# Patient Record
Sex: FEMALE | Race: WHITE | URBAN - METROPOLITAN AREA
[De-identification: names, ages, dates, MRNs, and addresses within clinical notes are randomized per-mention and may not be internally consistent; named-entity substitution may affect disease eponyms.]

---

## 2017-04-15 ENCOUNTER — HOSPITAL ENCOUNTER (OUTPATIENT)
Dept: MEDSURG UNIT | Facility: HOSPITAL | Age: 62
End: 2017-04-15
Attending: INTERNAL MEDICINE | Admitting: INTERNAL MEDICINE

## 2017-05-02 ENCOUNTER — HISTORICAL (OUTPATIENT)
Dept: SURGERY | Facility: HOSPITAL | Age: 62
End: 2017-05-02

## 2017-05-02 LAB — GRAM STN SPEC: NORMAL

## 2017-05-07 LAB — FINAL CULTURE: NORMAL

## 2017-05-16 ENCOUNTER — HISTORICAL (OUTPATIENT)
Dept: ADMINISTRATIVE | Facility: HOSPITAL | Age: 62
End: 2017-05-16

## 2017-05-16 LAB
ABS NEUT (OLG): 6.02 X10(3)/MCL (ref 2.1–9.2)
ALBUMIN SERPL-MCNC: 3.2 GM/DL (ref 3.4–5)
ALBUMIN/GLOB SERPL: 1 RATIO (ref 1–2)
ALP SERPL-CCNC: 276 UNIT/L (ref 20–120)
ALT SERPL-CCNC: 34 UNIT/L
AST SERPL-CCNC: 82 UNIT/L
BASOPHILS # BLD AUTO: 0.05 X10(3)/MCL
BASOPHILS NFR BLD AUTO: 1 % (ref 0–1)
BILIRUB SERPL-MCNC: 1.1 MG/DL
BILIRUBIN DIRECT+TOT PNL SERPL-MCNC: 0.3 MG/DL
BILIRUBIN DIRECT+TOT PNL SERPL-MCNC: 0.8 MG/DL
BUN SERPL-MCNC: 20 MG/DL (ref 7–25)
CALCIUM SERPL-MCNC: 11.9 MG/DL (ref 8.4–10.3)
CHLORIDE SERPL-SCNC: 97 MMOL/L (ref 96–110)
CO2 SERPL-SCNC: 23 MMOL/L (ref 24–32)
CREAT SERPL-MCNC: 1.02 MG/DL (ref 0.7–1.1)
EOSINOPHIL # BLD AUTO: 0.02 10*3/UL
EOSINOPHIL NFR BLD AUTO: 0 % (ref 0–5)
ERYTHROCYTE [DISTWIDTH] IN BLOOD BY AUTOMATED COUNT: 14.3 % (ref 11.5–14.5)
GLOBULIN SER-MCNC: 4.6 GM/ML (ref 2.3–3.5)
GLUCOSE SERPL-MCNC: 121 MG/DL (ref 65–99)
HCT VFR BLD AUTO: 39.6 % (ref 35–46)
HGB BLD-MCNC: 13.1 GM/DL (ref 12–16)
IMM GRANULOCYTES # BLD AUTO: 0.08 10*3/UL
IMM GRANULOCYTES NFR BLD AUTO: 1 %
LDH SERPL-CCNC: 319 UNIT/L
LYMPHOCYTES # BLD AUTO: 1.53 X10(3)/MCL
LYMPHOCYTES NFR BLD AUTO: 18 % (ref 15–40)
MCH RBC QN AUTO: 29 PG (ref 26–34)
MCHC RBC AUTO-ENTMCNC: 33.1 GM/DL (ref 31–37)
MCV RBC AUTO: 87.6 FL (ref 80–100)
MONOCYTES # BLD AUTO: 0.69 X10(3)/MCL
MONOCYTES NFR BLD AUTO: 8 % (ref 4–12)
NEUTROPHILS # BLD AUTO: 6.02 X10(3)/MCL
NEUTROPHILS NFR BLD AUTO: 72 X10(3)/MCL
PLATELET # BLD AUTO: 291 X10(3)/MCL (ref 130–400)
PMV BLD AUTO: 9.8 FL (ref 7.4–10.4)
POTASSIUM SERPL-SCNC: 4.6 MMOL/L (ref 3.6–5.2)
PROT SERPL-MCNC: 7.8 GM/DL (ref 6–8)
RBC # BLD AUTO: 4.52 X10(6)/MCL (ref 4–5.2)
SODIUM SERPL-SCNC: 135 MMOL/L (ref 135–146)
WBC # SPEC AUTO: 8.4 X10(3)/MCL (ref 4.5–11)

## 2017-05-17 ENCOUNTER — HISTORICAL (OUTPATIENT)
Dept: HEMATOLOGY/ONCOLOGY | Facility: CLINIC | Age: 62
End: 2017-05-17

## 2017-05-17 ENCOUNTER — HISTORICAL (OUTPATIENT)
Dept: INFUSION THERAPY | Facility: HOSPITAL | Age: 62
End: 2017-05-17

## 2017-05-23 ENCOUNTER — HISTORICAL (OUTPATIENT)
Dept: HEMATOLOGY/ONCOLOGY | Facility: CLINIC | Age: 62
End: 2017-05-23

## 2017-05-30 LAB — FINAL CULTURE: NORMAL

## 2017-06-07 ENCOUNTER — HISTORICAL (OUTPATIENT)
Dept: INFUSION THERAPY | Facility: HOSPITAL | Age: 62
End: 2017-06-07

## 2017-06-07 LAB
ABS NEUT (OLG): 4.25 X10(3)/MCL (ref 2.1–9.2)
ALBUMIN SERPL-MCNC: 3 GM/DL (ref 3.4–5)
ALBUMIN/GLOB SERPL: 1 {RATIO}
ALP SERPL-CCNC: 330 UNIT/L (ref 20–120)
ALT SERPL-CCNC: 44 UNIT/L
AST SERPL-CCNC: 50 UNIT/L
BASOPHILS # BLD AUTO: 0.02 X10(3)/MCL
BASOPHILS NFR BLD AUTO: 0 % (ref 0–1)
BILIRUB SERPL-MCNC: 0.6 MG/DL
BILIRUBIN DIRECT+TOT PNL SERPL-MCNC: 0.1 MG/DL
BILIRUBIN DIRECT+TOT PNL SERPL-MCNC: 0.5 MG/DL
BUN SERPL-MCNC: 17 MG/DL (ref 7–25)
CALCIUM SERPL-MCNC: 9.2 MG/DL (ref 8.4–10.3)
CHLORIDE SERPL-SCNC: 104 MMOL/L (ref 96–110)
CO2 SERPL-SCNC: 24 MMOL/L (ref 24–32)
CREAT SERPL-MCNC: 0.6 MG/DL (ref 0.7–1.1)
ERYTHROCYTE [DISTWIDTH] IN BLOOD BY AUTOMATED COUNT: 16.6 % (ref 11.5–14.5)
GLOBULIN SER-MCNC: 3.9 GM/ML (ref 2.3–3.5)
GLUCOSE SERPL-MCNC: 132 MG/DL (ref 65–99)
HCT VFR BLD AUTO: 33.8 % (ref 35–46)
HGB BLD-MCNC: 10.9 GM/DL (ref 12–16)
IMM GRANULOCYTES # BLD AUTO: 0.07 10*3/UL
IMM GRANULOCYTES NFR BLD AUTO: 1 %
LYMPHOCYTES # BLD AUTO: 1.52 X10(3)/MCL
LYMPHOCYTES NFR BLD AUTO: 24 % (ref 15–40)
MCH RBC QN AUTO: 28.2 PG (ref 26–34)
MCHC RBC AUTO-ENTMCNC: 32.2 GM/DL (ref 31–37)
MCV RBC AUTO: 87.6 FL (ref 80–100)
MONOCYTES # BLD AUTO: 0.5 X10(3)/MCL
MONOCYTES NFR BLD AUTO: 8 % (ref 4–12)
NEUTROPHILS # BLD AUTO: 4.25 X10(3)/MCL
NEUTROPHILS NFR BLD AUTO: 67 X10(3)/MCL
PLATELET # BLD AUTO: 289 X10(3)/MCL (ref 130–400)
PMV BLD AUTO: 9.6 FL (ref 7.4–10.4)
POTASSIUM SERPL-SCNC: 4.7 MMOL/L (ref 3.6–5.2)
PROT SERPL-MCNC: 6.9 GM/DL (ref 6–8)
RBC # BLD AUTO: 3.86 X10(6)/MCL (ref 4–5.2)
SODIUM SERPL-SCNC: 135 MMOL/L (ref 135–146)
WBC # SPEC AUTO: 6.4 X10(3)/MCL (ref 4.5–11)

## 2017-06-21 ENCOUNTER — HISTORICAL (OUTPATIENT)
Dept: INFUSION THERAPY | Facility: HOSPITAL | Age: 62
End: 2017-06-21

## 2017-06-21 LAB
ABS NEUT (OLG): 2.52 X10(3)/MCL (ref 2.1–9.2)
ALBUMIN SERPL-MCNC: 3.6 GM/DL (ref 3.4–5)
ALBUMIN/GLOB SERPL: 1 {RATIO}
ALP SERPL-CCNC: 328 UNIT/L (ref 20–120)
ALT SERPL-CCNC: 40 UNIT/L
AST SERPL-CCNC: 42 UNIT/L
BASOPHILS # BLD AUTO: 0.01 X10(3)/MCL
BASOPHILS NFR BLD AUTO: 0 % (ref 0–1)
BILIRUB SERPL-MCNC: 0.5 MG/DL
BILIRUBIN DIRECT+TOT PNL SERPL-MCNC: <0.1 MG/DL
BILIRUBIN DIRECT+TOT PNL SERPL-MCNC: >0.4 MG/DL
BUN SERPL-MCNC: 14 MG/DL (ref 7–25)
CALCIUM SERPL-MCNC: 9.6 MG/DL (ref 8.4–10.3)
CHLORIDE SERPL-SCNC: 111 MMOL/L (ref 96–110)
CO2 SERPL-SCNC: 20 MMOL/L (ref 24–32)
CREAT SERPL-MCNC: 0.63 MG/DL (ref 0.7–1.1)
EOSINOPHIL # BLD AUTO: 0.03 X10(3)/MCL
EOSINOPHIL NFR BLD AUTO: 1 % (ref 0–5)
ERYTHROCYTE [DISTWIDTH] IN BLOOD BY AUTOMATED COUNT: 18.6 % (ref 11.5–14.5)
GLOBULIN SER-MCNC: 3.6 GM/ML (ref 2.3–3.5)
GLUCOSE SERPL-MCNC: 113 MG/DL (ref 65–99)
HCT VFR BLD AUTO: 33.5 % (ref 35–46)
HGB BLD-MCNC: 10.9 GM/DL (ref 12–16)
IMM GRANULOCYTES # BLD AUTO: 0.04 10*3/UL
IMM GRANULOCYTES NFR BLD AUTO: 1 %
LYMPHOCYTES # BLD AUTO: 2.15 X10(3)/MCL
LYMPHOCYTES NFR BLD AUTO: 42 % (ref 15–40)
MCH RBC QN AUTO: 28.5 PG (ref 26–34)
MCHC RBC AUTO-ENTMCNC: 32.5 GM/DL (ref 31–37)
MCV RBC AUTO: 87.7 FL (ref 80–100)
MONOCYTES # BLD AUTO: 0.43 X10(3)/MCL
MONOCYTES NFR BLD AUTO: 8 % (ref 4–12)
NEUTROPHILS # BLD AUTO: 2.52 X10(3)/MCL
NEUTROPHILS NFR BLD AUTO: 49 X10(3)/MCL
PLATELET # BLD AUTO: 279 X10(3)/MCL (ref 130–400)
PMV BLD AUTO: 9.5 FL (ref 7.4–10.4)
POTASSIUM SERPL-SCNC: 4.4 MMOL/L (ref 3.6–5.2)
PROT SERPL-MCNC: 7.2 GM/DL (ref 6–8)
RBC # BLD AUTO: 3.82 X10(6)/MCL (ref 4–5.2)
SODIUM SERPL-SCNC: 137 MMOL/L (ref 135–146)
WBC # SPEC AUTO: 5.2 X10(3)/MCL (ref 4.5–11)

## 2017-07-07 ENCOUNTER — HISTORICAL (OUTPATIENT)
Dept: INFUSION THERAPY | Facility: HOSPITAL | Age: 62
End: 2017-07-07

## 2017-08-08 ENCOUNTER — HISTORICAL (OUTPATIENT)
Dept: INFUSION THERAPY | Facility: HOSPITAL | Age: 62
End: 2017-08-08

## 2017-09-07 ENCOUNTER — HISTORICAL (OUTPATIENT)
Dept: ADMINISTRATIVE | Facility: HOSPITAL | Age: 62
End: 2017-09-07

## 2017-09-07 LAB
ABS NEUT (OLG): 2.56 X10(3)/MCL (ref 2.1–9.2)
ALBUMIN SERPL-MCNC: 3.8 GM/DL (ref 3.4–5)
ALBUMIN/GLOB SERPL: 1 RATIO (ref 1–2)
ALP SERPL-CCNC: 182 UNIT/L (ref 45–117)
ALT SERPL-CCNC: 47 UNIT/L (ref 12–78)
AST SERPL-CCNC: 36 UNIT/L (ref 15–37)
BASOPHILS # BLD AUTO: 0.03 X10(3)/MCL
BASOPHILS NFR BLD AUTO: 1 % (ref 0–1)
BILIRUB SERPL-MCNC: 0.3 MG/DL (ref 0.2–1)
BILIRUBIN DIRECT+TOT PNL SERPL-MCNC: 0.1 MG/DL
BILIRUBIN DIRECT+TOT PNL SERPL-MCNC: 0.2 MG/DL
BUN SERPL-MCNC: 15 MG/DL (ref 7–18)
CALCIUM SERPL-MCNC: 10 MG/DL (ref 8.5–10.1)
CEA SERPL-MCNC: 10.2 NG/ML
CHLORIDE SERPL-SCNC: 106 MMOL/L (ref 98–107)
CO2 SERPL-SCNC: 27 MMOL/L (ref 21–32)
CREAT SERPL-MCNC: 0.9 MG/DL (ref 0.6–1.3)
EOSINOPHIL # BLD AUTO: 0.06 X10(3)/MCL
EOSINOPHIL NFR BLD AUTO: 1 % (ref 0–5)
ERYTHROCYTE [DISTWIDTH] IN BLOOD BY AUTOMATED COUNT: 18.3 % (ref 11.5–14.5)
GLOBULIN SER-MCNC: 4.3 GM/ML (ref 2.3–3.5)
GLUCOSE SERPL-MCNC: 96 MG/DL (ref 74–106)
HCT VFR BLD AUTO: 38.7 % (ref 35–46)
HGB BLD-MCNC: 12.7 GM/DL (ref 12–16)
IMM GRANULOCYTES # BLD AUTO: 0.04 10*3/UL
IMM GRANULOCYTES NFR BLD AUTO: 1 %
LYMPHOCYTES # BLD AUTO: 2 X10(3)/MCL
LYMPHOCYTES NFR BLD AUTO: 39 % (ref 15–40)
MCH RBC QN AUTO: 31.2 PG (ref 26–34)
MCHC RBC AUTO-ENTMCNC: 32.8 GM/DL (ref 31–37)
MCV RBC AUTO: 95.1 FL (ref 80–100)
MONOCYTES # BLD AUTO: 0.48 X10(3)/MCL
MONOCYTES NFR BLD AUTO: 9 % (ref 4–12)
NEUTROPHILS # BLD AUTO: 2.56 X10(3)/MCL
NEUTROPHILS NFR BLD AUTO: 49 X10(3)/MCL
PLATELET # BLD AUTO: 278 X10(3)/MCL (ref 130–400)
PMV BLD AUTO: 9.5 FL (ref 7.4–10.4)
POTASSIUM SERPL-SCNC: 4.3 MMOL/L (ref 3.5–5.1)
PROT SERPL-MCNC: 8.1 GM/DL (ref 6.4–8.2)
RBC # BLD AUTO: 4.07 X10(6)/MCL (ref 4–5.2)
SODIUM SERPL-SCNC: 140 MMOL/L (ref 136–145)
WBC # SPEC AUTO: 5.2 X10(3)/MCL (ref 4.5–11)

## 2017-09-08 ENCOUNTER — HISTORICAL (OUTPATIENT)
Dept: INFUSION THERAPY | Facility: HOSPITAL | Age: 62
End: 2017-09-08

## 2017-09-15 ENCOUNTER — HISTORICAL (OUTPATIENT)
Dept: RADIOLOGY | Facility: HOSPITAL | Age: 62
End: 2017-09-15

## 2017-10-06 ENCOUNTER — HISTORICAL (OUTPATIENT)
Dept: INFUSION THERAPY | Facility: HOSPITAL | Age: 62
End: 2017-10-06

## 2017-10-06 LAB
ABS NEUT (OLG): 1.42 X10(3)/MCL (ref 2.1–9.2)
BASOPHILS # BLD AUTO: 0.01 X10(3)/MCL
BASOPHILS NFR BLD AUTO: 0 % (ref 0–1)
ERYTHROCYTE [DISTWIDTH] IN BLOOD BY AUTOMATED COUNT: 15.9 % (ref 11.5–14.5)
HCT VFR BLD AUTO: 35.7 % (ref 35–46)
HGB BLD-MCNC: 12.2 GM/DL (ref 12–16)
IMM GRANULOCYTES # BLD AUTO: 0.01 10*3/UL
IMM GRANULOCYTES NFR BLD AUTO: 0 %
LYMPHOCYTES # BLD AUTO: 1.06 X10(3)/MCL
LYMPHOCYTES NFR BLD AUTO: 41 % (ref 15–40)
MCH RBC QN AUTO: 33.7 PG (ref 26–34)
MCHC RBC AUTO-ENTMCNC: 34.2 GM/DL (ref 31–37)
MCV RBC AUTO: 98.6 FL (ref 80–100)
MONOCYTES # BLD AUTO: 0.09 X10(3)/MCL
MONOCYTES NFR BLD AUTO: 4 % (ref 4–12)
NEUTROPHILS # BLD AUTO: 1.42 X10(3)/MCL
NEUTROPHILS NFR BLD AUTO: 55 X10(3)/MCL
PLATELET # BLD AUTO: 141 X10(3)/MCL (ref 130–400)
PMV BLD AUTO: 9.8 FL (ref 7.4–10.4)
RBC # BLD AUTO: 3.62 X10(6)/MCL (ref 4–5.2)
WBC # SPEC AUTO: 2.6 X10(3)/MCL (ref 4.5–11)

## 2017-10-23 ENCOUNTER — HISTORICAL (OUTPATIENT)
Dept: ADMINISTRATIVE | Facility: HOSPITAL | Age: 62
End: 2017-10-23

## 2017-10-23 LAB
ABS NEUT (OLG): 2.35 X10(3)/MCL (ref 2.1–9.2)
ALBUMIN SERPL-MCNC: 3.4 GM/DL (ref 3.4–5)
ALBUMIN/GLOB SERPL: 1 RATIO (ref 1–2)
ALP SERPL-CCNC: 103 UNIT/L (ref 45–117)
ALT SERPL-CCNC: 26 UNIT/L (ref 12–78)
AST SERPL-CCNC: 14 UNIT/L (ref 15–37)
BASOPHILS # BLD AUTO: 0.06 X10(3)/MCL
BASOPHILS NFR BLD AUTO: 2 % (ref 0–1)
BILIRUB SERPL-MCNC: 0.3 MG/DL (ref 0.2–1)
BILIRUBIN DIRECT+TOT PNL SERPL-MCNC: 0.1 MG/DL
BILIRUBIN DIRECT+TOT PNL SERPL-MCNC: 0.2 MG/DL
BUN SERPL-MCNC: 10 MG/DL (ref 7–18)
CALCIUM SERPL-MCNC: 9.9 MG/DL (ref 8.5–10.1)
CHLORIDE SERPL-SCNC: 106 MMOL/L (ref 98–107)
CO2 SERPL-SCNC: 26 MMOL/L (ref 21–32)
CREAT SERPL-MCNC: 0.9 MG/DL (ref 0.6–1.3)
ERYTHROCYTE [DISTWIDTH] IN BLOOD BY AUTOMATED COUNT: 17.2 % (ref 11.5–14.5)
GLOBULIN SER-MCNC: 3.9 GM/ML (ref 2.3–3.5)
GLUCOSE SERPL-MCNC: 128 MG/DL (ref 74–106)
HCT VFR BLD AUTO: 35.8 % (ref 35–46)
HGB BLD-MCNC: 12.4 GM/DL (ref 12–16)
IMM GRANULOCYTES # BLD AUTO: 0.01 10*3/UL
IMM GRANULOCYTES NFR BLD AUTO: 0 %
LYMPHOCYTES # BLD AUTO: 0.77 X10(3)/MCL
LYMPHOCYTES NFR BLD AUTO: 23 % (ref 15–40)
MCH RBC QN AUTO: 34.3 PG (ref 26–34)
MCHC RBC AUTO-ENTMCNC: 34.6 GM/DL (ref 31–37)
MCV RBC AUTO: 98.9 FL (ref 80–100)
MONOCYTES # BLD AUTO: 0.16 X10(3)/MCL
MONOCYTES NFR BLD AUTO: 5 % (ref 4–12)
NEUTROPHILS # BLD AUTO: 2.35 X10(3)/MCL
NEUTROPHILS NFR BLD AUTO: 70 X10(3)/MCL
PLATELET # BLD AUTO: 354 X10(3)/MCL (ref 130–400)
PMV BLD AUTO: 9.3 FL (ref 7.4–10.4)
POTASSIUM SERPL-SCNC: 4.1 MMOL/L (ref 3.5–5.1)
PROT SERPL-MCNC: 7.3 GM/DL (ref 6.4–8.2)
RBC # BLD AUTO: 3.62 X10(6)/MCL (ref 4–5.2)
SODIUM SERPL-SCNC: 138 MMOL/L (ref 136–145)
WBC # SPEC AUTO: 3.4 X10(3)/MCL (ref 4.5–11)

## 2017-11-03 ENCOUNTER — HISTORICAL (OUTPATIENT)
Dept: INFUSION THERAPY | Facility: HOSPITAL | Age: 62
End: 2017-11-03

## 2017-11-03 LAB
ABS NEUT (OLG): 1.79 X10(3)/MCL
ALBUMIN SERPL-MCNC: 3.3 GM/DL (ref 3.4–5)
ALBUMIN/GLOB SERPL: 1 RATIO (ref 1–2)
ALP SERPL-CCNC: 91 UNIT/L (ref 45–117)
ALT SERPL-CCNC: 25 UNIT/L (ref 12–78)
ANISOCYTOSIS BLD QL SMEAR: NORMAL
AST SERPL-CCNC: 15 UNIT/L (ref 15–37)
BASOPHILS NFR BLD MANUAL: 1 %
BILIRUB SERPL-MCNC: 0.4 MG/DL (ref 0.2–1)
BILIRUBIN DIRECT+TOT PNL SERPL-MCNC: 0.1 MG/DL
BILIRUBIN DIRECT+TOT PNL SERPL-MCNC: 0.3 MG/DL
BUN SERPL-MCNC: 12 MG/DL (ref 7–18)
CALCIUM SERPL-MCNC: 9.5 MG/DL (ref 8.5–10.1)
CHLORIDE SERPL-SCNC: 106 MMOL/L (ref 98–107)
CO2 SERPL-SCNC: 27 MMOL/L (ref 21–32)
CREAT SERPL-MCNC: 1 MG/DL (ref 0.6–1.3)
EOSINOPHIL NFR BLD MANUAL: 0 %
ERYTHROCYTE [DISTWIDTH] IN BLOOD BY AUTOMATED COUNT: 17.9 % (ref 11.5–14.5)
GLOBULIN SER-MCNC: 3.9 GM/ML (ref 2.3–3.5)
GLUCOSE SERPL-MCNC: 122 MG/DL (ref 74–106)
GRANULOCYTES NFR BLD MANUAL: 68 % (ref 43–75)
HCT VFR BLD AUTO: 35.2 % (ref 35–46)
HGB BLD-MCNC: 12.2 GM/DL (ref 12–16)
HYPOCHROMIA BLD QL SMEAR: NORMAL
LYMPHOCYTES NFR BLD MANUAL: 27 % (ref 20.5–51.1)
MACROCYTES BLD QL SMEAR: NORMAL
MCH RBC QN AUTO: 35 PG (ref 26–34)
MCHC RBC AUTO-ENTMCNC: 34.7 GM/DL (ref 31–37)
MCV RBC AUTO: 100.9 FL (ref 80–100)
MICROCYTES BLD QL SMEAR: NORMAL
MONOCYTES NFR BLD MANUAL: 2 % (ref 2–9)
NEUTROPHILS # BLD AUTO: 1.75 X10(3)/MCL
NEUTS BAND NFR BLD MANUAL: 2 % (ref 0–10)
PLATELET # BLD AUTO: 167 X10(3)/MCL (ref 130–400)
PLATELET # BLD EST: ADEQUATE 10*3/UL
PMV BLD AUTO: 9 FL (ref 7.4–10.4)
POLYCHROMASIA BLD QL SMEAR: NORMAL
POTASSIUM SERPL-SCNC: 4.4 MMOL/L (ref 3.5–5.1)
PROT SERPL-MCNC: 7.2 GM/DL (ref 6.4–8.2)
RBC # BLD AUTO: 3.49 X10(6)/MCL (ref 4–5.2)
RBC MORPH BLD: NORMAL
SODIUM SERPL-SCNC: 139 MMOL/L (ref 136–145)
WBC # SPEC AUTO: 2.7 X10(3)/MCL (ref 4.5–11)

## 2017-11-30 ENCOUNTER — HISTORICAL (OUTPATIENT)
Dept: ADMINISTRATIVE | Facility: HOSPITAL | Age: 62
End: 2017-11-30

## 2017-11-30 LAB
ABS NEUT (OLG): 1.21 X10(3)/MCL (ref 2.1–9.2)
ALBUMIN SERPL-MCNC: 3.4 GM/DL (ref 3.4–5)
ALBUMIN/GLOB SERPL: 1 RATIO (ref 1–2)
ALP SERPL-CCNC: 91 UNIT/L (ref 45–117)
ALT SERPL-CCNC: 23 UNIT/L (ref 12–78)
AST SERPL-CCNC: 11 UNIT/L (ref 15–37)
BASOPHILS # BLD AUTO: 0.02 X10(3)/MCL
BASOPHILS NFR BLD AUTO: 1 % (ref 0–1)
BILIRUB SERPL-MCNC: 0.4 MG/DL (ref 0.2–1)
BILIRUBIN DIRECT+TOT PNL SERPL-MCNC: 0.1 MG/DL
BILIRUBIN DIRECT+TOT PNL SERPL-MCNC: 0.3 MG/DL
BUN SERPL-MCNC: 18 MG/DL (ref 7–18)
CALCIUM SERPL-MCNC: 9.3 MG/DL (ref 8.5–10.1)
CHLORIDE SERPL-SCNC: 104 MMOL/L (ref 98–107)
CO2 SERPL-SCNC: 29 MMOL/L (ref 21–32)
CREAT SERPL-MCNC: 1.4 MG/DL (ref 0.6–1.3)
EOSINOPHIL # BLD AUTO: 0.03 X10(3)/MCL
EOSINOPHIL NFR BLD AUTO: 1 % (ref 0–5)
ERYTHROCYTE [DISTWIDTH] IN BLOOD BY AUTOMATED COUNT: 19.9 % (ref 11.5–14.5)
GLOBULIN SER-MCNC: 3.8 GM/ML (ref 2.3–3.5)
GLUCOSE SERPL-MCNC: 99 MG/DL (ref 74–106)
HCT VFR BLD AUTO: 36.1 % (ref 35–46)
HGB BLD-MCNC: 12.5 GM/DL (ref 12–16)
IMM GRANULOCYTES # BLD AUTO: 0.01 10*3/UL
IMM GRANULOCYTES NFR BLD AUTO: 0 %
LYMPHOCYTES # BLD AUTO: 1.39 X10(3)/MCL
LYMPHOCYTES NFR BLD AUTO: 49 % (ref 15–40)
MCH RBC QN AUTO: 36.2 PG (ref 26–34)
MCHC RBC AUTO-ENTMCNC: 34.6 GM/DL (ref 31–37)
MCV RBC AUTO: 104.6 FL (ref 80–100)
MONOCYTES # BLD AUTO: 0.18 X10(3)/MCL
MONOCYTES NFR BLD AUTO: 6 % (ref 4–12)
NEUTROPHILS # BLD AUTO: 1.21 X10(3)/MCL
NEUTROPHILS NFR BLD AUTO: 43 X10(3)/MCL
PLATELET # BLD AUTO: 190 X10(3)/MCL (ref 130–400)
PMV BLD AUTO: 9 FL (ref 7.4–10.4)
POTASSIUM SERPL-SCNC: 4 MMOL/L (ref 3.5–5.1)
PROT SERPL-MCNC: 7.2 GM/DL (ref 6.4–8.2)
RBC # BLD AUTO: 3.45 X10(6)/MCL (ref 4–5.2)
SODIUM SERPL-SCNC: 140 MMOL/L (ref 136–145)
WBC # SPEC AUTO: 2.8 X10(3)/MCL (ref 4.5–11)

## 2017-12-01 ENCOUNTER — HISTORICAL (OUTPATIENT)
Dept: INFUSION THERAPY | Facility: HOSPITAL | Age: 62
End: 2017-12-01

## 2017-12-11 ENCOUNTER — HISTORICAL (OUTPATIENT)
Dept: RADIOLOGY | Facility: HOSPITAL | Age: 62
End: 2017-12-11

## 2017-12-20 ENCOUNTER — HISTORICAL (OUTPATIENT)
Dept: RADIOLOGY | Facility: HOSPITAL | Age: 62
End: 2017-12-20

## 2017-12-29 ENCOUNTER — HISTORICAL (OUTPATIENT)
Dept: INFUSION THERAPY | Facility: HOSPITAL | Age: 62
End: 2017-12-29

## 2017-12-29 LAB
ABS NEUT (OLG): 1.51 X10(3)/MCL (ref 2.1–9.2)
ALBUMIN SERPL-MCNC: 3.1 GM/DL (ref 3.4–5)
ALBUMIN/GLOB SERPL: 1 RATIO (ref 1–2)
ALP SERPL-CCNC: 84 UNIT/L (ref 45–117)
ALT SERPL-CCNC: 23 UNIT/L (ref 12–78)
AST SERPL-CCNC: 14 UNIT/L (ref 15–37)
BASOPHILS # BLD AUTO: 0.02 X10(3)/MCL
BASOPHILS NFR BLD AUTO: 1 % (ref 0–1)
BILIRUB SERPL-MCNC: 0.3 MG/DL (ref 0.2–1)
BILIRUBIN DIRECT+TOT PNL SERPL-MCNC: <0.1 MG/DL
BILIRUBIN DIRECT+TOT PNL SERPL-MCNC: ABNORMAL MG/DL
BUN SERPL-MCNC: 13 MG/DL (ref 7–18)
CALCIUM SERPL-MCNC: 9.3 MG/DL (ref 8.5–10.1)
CHLORIDE SERPL-SCNC: 109 MMOL/L (ref 98–107)
CO2 SERPL-SCNC: 25 MMOL/L (ref 21–32)
CREAT SERPL-MCNC: 0.9 MG/DL (ref 0.6–1.3)
EOSINOPHIL # BLD AUTO: 0.03 X10(3)/MCL
EOSINOPHIL NFR BLD AUTO: 1 % (ref 0–5)
ERYTHROCYTE [DISTWIDTH] IN BLOOD BY AUTOMATED COUNT: 18.3 % (ref 11.5–14.5)
GLOBULIN SER-MCNC: 3.8 GM/ML (ref 2.3–3.5)
GLUCOSE SERPL-MCNC: 109 MG/DL (ref 74–106)
HCT VFR BLD AUTO: 34.3 % (ref 35–46)
HGB BLD-MCNC: 12.2 GM/DL (ref 12–16)
IMM GRANULOCYTES # BLD AUTO: 0.01 10*3/UL
IMM GRANULOCYTES NFR BLD AUTO: 0 %
LYMPHOCYTES # BLD AUTO: 1.33 X10(3)/MCL
LYMPHOCYTES NFR BLD AUTO: 44 % (ref 15–40)
MCH RBC QN AUTO: 38.6 PG (ref 26–34)
MCHC RBC AUTO-ENTMCNC: 35.6 GM/DL (ref 31–37)
MCV RBC AUTO: 108.5 FL (ref 80–100)
MONOCYTES # BLD AUTO: 0.15 X10(3)/MCL
MONOCYTES NFR BLD AUTO: 5 % (ref 4–12)
NEUTROPHILS # BLD AUTO: 1.51 X10(3)/MCL
NEUTROPHILS NFR BLD AUTO: 50 X10(3)/MCL
PLATELET # BLD AUTO: 166 X10(3)/MCL (ref 130–400)
PMV BLD AUTO: 9.7 FL (ref 7.4–10.4)
POTASSIUM SERPL-SCNC: 3.7 MMOL/L (ref 3.5–5.1)
PROT SERPL-MCNC: 6.9 GM/DL (ref 6.4–8.2)
RBC # BLD AUTO: 3.16 X10(6)/MCL (ref 4–5.2)
SODIUM SERPL-SCNC: 142 MMOL/L (ref 136–145)
WBC # SPEC AUTO: 3 X10(3)/MCL (ref 4.5–11)

## 2018-01-25 ENCOUNTER — HISTORICAL (OUTPATIENT)
Dept: ADMINISTRATIVE | Facility: HOSPITAL | Age: 63
End: 2018-01-25

## 2018-01-25 LAB
ABS NEUT (OLG): 2.46 X10(3)/MCL
ALBUMIN SERPL-MCNC: 3.1 GM/DL (ref 3.4–5)
ALBUMIN/GLOB SERPL: 1 RATIO (ref 1–2)
ALP SERPL-CCNC: 76 UNIT/L (ref 45–117)
ALT SERPL-CCNC: 18 UNIT/L (ref 12–78)
ANISOCYTOSIS BLD QL SMEAR: NORMAL
AST SERPL-CCNC: 13 UNIT/L (ref 15–37)
BASOPHILS NFR BLD MANUAL: 0 %
BILIRUB SERPL-MCNC: 0.5 MG/DL (ref 0.2–1)
BILIRUBIN DIRECT+TOT PNL SERPL-MCNC: 0.1 MG/DL
BILIRUBIN DIRECT+TOT PNL SERPL-MCNC: 0.4 MG/DL
BUN SERPL-MCNC: 11 MG/DL (ref 7–18)
CALCIUM SERPL-MCNC: 9 MG/DL (ref 8.5–10.1)
CHLORIDE SERPL-SCNC: 112 MMOL/L (ref 98–107)
CO2 SERPL-SCNC: 26 MMOL/L (ref 21–32)
CREAT SERPL-MCNC: 1 MG/DL (ref 0.6–1.3)
EOSINOPHIL NFR BLD MANUAL: 0 %
ERYTHROCYTE [DISTWIDTH] IN BLOOD BY AUTOMATED COUNT: 15 % (ref 11.5–14.5)
GLOBULIN SER-MCNC: 3.7 GM/ML (ref 2.3–3.5)
GLUCOSE SERPL-MCNC: 124 MG/DL (ref 74–106)
GRANULOCYTES NFR BLD MANUAL: 71 % (ref 43–75)
HCT VFR BLD AUTO: 33.7 % (ref 35–46)
HGB BLD-MCNC: 12.1 GM/DL (ref 12–16)
HYPOCHROMIA BLD QL SMEAR: NORMAL
LYMPHOCYTES NFR BLD MANUAL: 24 % (ref 20.5–51.1)
MACROCYTES BLD QL SMEAR: NORMAL
MCH RBC QN AUTO: 40.9 PG (ref 26–34)
MCHC RBC AUTO-ENTMCNC: 35.9 GM/DL (ref 31–37)
MCV RBC AUTO: 113.9 FL (ref 80–100)
MICROCYTES BLD QL SMEAR: NORMAL
MONOCYTES NFR BLD MANUAL: 2 % (ref 2–9)
PLATELET # BLD AUTO: 169 X10(3)/MCL (ref 130–400)
PLATELET # BLD EST: ADEQUATE 10*3/UL
PMV BLD AUTO: 9.7 FL (ref 7.4–10.4)
POLYCHROMASIA BLD QL SMEAR: NORMAL
POTASSIUM SERPL-SCNC: 3.8 MMOL/L (ref 3.5–5.1)
PROT SERPL-MCNC: 6.8 GM/DL (ref 6.4–8.2)
RBC # BLD AUTO: 2.96 X10(6)/MCL (ref 4–5.2)
RBC MORPH BLD: NORMAL
SCHISTOCYTES BLD QL AUTO: NORMAL
SODIUM SERPL-SCNC: 144 MMOL/L (ref 136–145)
WBC # SPEC AUTO: 3.3 X10(3)/MCL (ref 4.5–11)

## 2018-01-26 ENCOUNTER — HISTORICAL (OUTPATIENT)
Dept: INFUSION THERAPY | Facility: HOSPITAL | Age: 63
End: 2018-01-26

## 2018-02-22 ENCOUNTER — HISTORICAL (OUTPATIENT)
Dept: ADMINISTRATIVE | Facility: HOSPITAL | Age: 63
End: 2018-02-22

## 2018-02-22 LAB
ABS NEUT (OLG): 1.66 X10(3)/MCL (ref 2.1–9.2)
ALBUMIN SERPL-MCNC: 3.4 GM/DL (ref 3.4–5)
ALBUMIN/GLOB SERPL: 1 RATIO (ref 1–2)
ALP SERPL-CCNC: 74 UNIT/L (ref 45–117)
ALT SERPL-CCNC: 18 UNIT/L (ref 12–78)
ANISOCYTOSIS BLD QL SMEAR: ABNORMAL
AST SERPL-CCNC: 10 UNIT/L (ref 15–37)
BASOPHILS NFR BLD MANUAL: 0 %
BILIRUB SERPL-MCNC: 0.4 MG/DL (ref 0.2–1)
BILIRUBIN DIRECT+TOT PNL SERPL-MCNC: 0.1 MG/DL
BILIRUBIN DIRECT+TOT PNL SERPL-MCNC: 0.3 MG/DL
BUN SERPL-MCNC: 7 MG/DL (ref 7–18)
CALCIUM SERPL-MCNC: 9.3 MG/DL (ref 8.5–10.1)
CHLORIDE SERPL-SCNC: 111 MMOL/L (ref 98–107)
CO2 SERPL-SCNC: 26 MMOL/L (ref 21–32)
CREAT SERPL-MCNC: 0.8 MG/DL (ref 0.6–1.3)
EOSINOPHIL NFR BLD MANUAL: 0 %
ERYTHROCYTE [DISTWIDTH] IN BLOOD BY AUTOMATED COUNT: 13 % (ref 11.5–14.5)
GLOBULIN SER-MCNC: 3.7 GM/ML (ref 2.3–3.5)
GLUCOSE SERPL-MCNC: 106 MG/DL (ref 74–106)
GRANULOCYTES NFR BLD MANUAL: 54 % (ref 43–75)
HCT VFR BLD AUTO: 34.7 % (ref 35–46)
HGB BLD-MCNC: 12.3 GM/DL (ref 12–16)
LYMPHOCYTES NFR BLD MANUAL: 30 % (ref 20.5–51.1)
MACROCYTES BLD QL SMEAR: ABNORMAL
MCH RBC QN AUTO: 41 PG (ref 26–34)
MCHC RBC AUTO-ENTMCNC: 35.4 GM/DL (ref 31–37)
MCV RBC AUTO: 115.7 FL (ref 80–100)
MONOCYTES NFR BLD MANUAL: 10 % (ref 2–9)
NEUTS BAND NFR BLD MANUAL: 6 % (ref 0–10)
PLATELET # BLD AUTO: 165 X10(3)/MCL (ref 130–400)
PLATELET # BLD EST: NORMAL 10*3/UL
PMV BLD AUTO: 9.2 FL (ref 7.4–10.4)
POTASSIUM SERPL-SCNC: 3.7 MMOL/L (ref 3.5–5.1)
PROT SERPL-MCNC: 7.1 GM/DL (ref 6.4–8.2)
RBC # BLD AUTO: 3 X10(6)/MCL (ref 4–5.2)
RBC MORPH BLD: ABNORMAL
SODIUM SERPL-SCNC: 142 MMOL/L (ref 136–145)
WBC # SPEC AUTO: 2.8 X10(3)/MCL (ref 4.5–11)

## 2018-02-23 ENCOUNTER — HISTORICAL (OUTPATIENT)
Dept: INFUSION THERAPY | Facility: HOSPITAL | Age: 63
End: 2018-02-23

## 2018-03-23 ENCOUNTER — HISTORICAL (OUTPATIENT)
Dept: INFUSION THERAPY | Facility: HOSPITAL | Age: 63
End: 2018-03-23

## 2018-03-23 LAB
ABS NEUT (OLG): 2.32 X10(3)/MCL (ref 2.1–9.2)
ALBUMIN SERPL-MCNC: 3.3 GM/DL (ref 3.4–5)
ALBUMIN/GLOB SERPL: 1 RATIO (ref 1–2)
ALP SERPL-CCNC: 73 UNIT/L (ref 45–117)
ALT SERPL-CCNC: 18 UNIT/L (ref 12–78)
ANISOCYTOSIS BLD QL SMEAR: NORMAL
AST SERPL-CCNC: 13 UNIT/L (ref 15–37)
BASOPHILS NFR BLD MANUAL: 1 %
BILIRUB SERPL-MCNC: 0.5 MG/DL (ref 0.2–1)
BILIRUBIN DIRECT+TOT PNL SERPL-MCNC: 0.1 MG/DL
BILIRUBIN DIRECT+TOT PNL SERPL-MCNC: 0.4 MG/DL
BUN SERPL-MCNC: 6 MG/DL (ref 7–18)
CALCIUM SERPL-MCNC: 9.8 MG/DL (ref 8.5–10.1)
CHLORIDE SERPL-SCNC: 109 MMOL/L (ref 98–107)
CO2 SERPL-SCNC: 26 MMOL/L (ref 21–32)
CREAT SERPL-MCNC: 0.9 MG/DL (ref 0.6–1.3)
EOSINOPHIL NFR BLD MANUAL: 0 %
ERYTHROCYTE [DISTWIDTH] IN BLOOD BY AUTOMATED COUNT: 12.7 % (ref 11.5–14.5)
GLOBULIN SER-MCNC: 3.7 GM/ML (ref 2.3–3.5)
GLUCOSE SERPL-MCNC: 123 MG/DL (ref 74–106)
GRANULOCYTES NFR BLD MANUAL: 54 % (ref 43–75)
HCT VFR BLD AUTO: 35.7 % (ref 35–46)
HGB BLD-MCNC: 12.7 GM/DL (ref 12–16)
HYPOCHROMIA BLD QL SMEAR: NORMAL
LYMPHOCYTES NFR BLD MANUAL: 39 % (ref 20.5–51.1)
MACROCYTES BLD QL SMEAR: NORMAL
MCH RBC QN AUTO: 40.2 PG (ref 26–34)
MCHC RBC AUTO-ENTMCNC: 35.6 GM/DL (ref 31–37)
MCV RBC AUTO: 113 FL (ref 80–100)
MONOCYTES NFR BLD MANUAL: 3 % (ref 2–9)
NEUTS BAND NFR BLD MANUAL: 3 % (ref 0–10)
PLATELET # BLD AUTO: 175 X10(3)/MCL (ref 130–400)
PLATELET # BLD EST: NORMAL 10*3/UL
PMV BLD AUTO: 9.8 FL (ref 7.4–10.4)
POTASSIUM SERPL-SCNC: 4.3 MMOL/L (ref 3.5–5.1)
PROT SERPL-MCNC: 7 GM/DL (ref 6.4–8.2)
RBC # BLD AUTO: 3.16 X10(6)/MCL (ref 4–5.2)
RBC MORPH BLD: NORMAL
SODIUM SERPL-SCNC: 141 MMOL/L (ref 136–145)
WBC # SPEC AUTO: 3.3 X10(3)/MCL (ref 4.5–11)

## 2018-04-23 ENCOUNTER — HISTORICAL (OUTPATIENT)
Dept: INFUSION THERAPY | Facility: HOSPITAL | Age: 63
End: 2018-04-23

## 2018-04-23 LAB
ABS NEUT (OLG): 2.06 X10(3)/MCL (ref 2.1–9.2)
ALBUMIN SERPL-MCNC: 3.4 GM/DL (ref 3.4–5)
ALBUMIN/GLOB SERPL: 1 RATIO (ref 1–2)
ALP SERPL-CCNC: 78 UNIT/L (ref 45–117)
ALT SERPL-CCNC: 19 UNIT/L (ref 12–78)
AST SERPL-CCNC: 12 UNIT/L (ref 15–37)
BASOPHILS # BLD AUTO: 0.01 X10(3)/MCL
BASOPHILS NFR BLD AUTO: 0 %
BILIRUB SERPL-MCNC: 0.3 MG/DL (ref 0.2–1)
BILIRUBIN DIRECT+TOT PNL SERPL-MCNC: 0.1 MG/DL
BILIRUBIN DIRECT+TOT PNL SERPL-MCNC: 0.2 MG/DL
BUN SERPL-MCNC: 9 MG/DL (ref 7–18)
CALCIUM SERPL-MCNC: 9.4 MG/DL (ref 8.5–10.1)
CHLORIDE SERPL-SCNC: 109 MMOL/L (ref 98–107)
CO2 SERPL-SCNC: 26 MMOL/L (ref 21–32)
CREAT SERPL-MCNC: 0.9 MG/DL (ref 0.6–1.3)
ERYTHROCYTE [DISTWIDTH] IN BLOOD BY AUTOMATED COUNT: 13 % (ref 11.5–14.5)
GLOBULIN SER-MCNC: 3.8 GM/ML (ref 2.3–3.5)
GLUCOSE SERPL-MCNC: 111 MG/DL (ref 74–106)
HCT VFR BLD AUTO: 36.8 % (ref 35–46)
HGB BLD-MCNC: 13.1 GM/DL (ref 12–16)
IMM GRANULOCYTES # BLD AUTO: 0.01 10*3/UL
IMM GRANULOCYTES NFR BLD AUTO: 0 %
LYMPHOCYTES # BLD AUTO: 0.75 X10(3)/MCL
LYMPHOCYTES NFR BLD AUTO: 25 % (ref 13–40)
MCH RBC QN AUTO: 40.2 PG (ref 26–34)
MCHC RBC AUTO-ENTMCNC: 35.6 GM/DL (ref 31–37)
MCV RBC AUTO: 112.9 FL (ref 80–100)
MONOCYTES # BLD AUTO: 0.16 X10(3)/MCL
MONOCYTES NFR BLD AUTO: 5 % (ref 4–12)
NEUTROPHILS # BLD AUTO: 2.06 X10(3)/MCL
NEUTROPHILS NFR BLD AUTO: 69 X10(3)/MCL
PLATELET # BLD AUTO: 162 X10(3)/MCL (ref 130–400)
PMV BLD AUTO: 10 FL (ref 7.4–10.4)
POTASSIUM SERPL-SCNC: 3.6 MMOL/L (ref 3.5–5.1)
PROT SERPL-MCNC: 7.2 GM/DL (ref 6.4–8.2)
RBC # BLD AUTO: 3.26 X10(6)/MCL (ref 4–5.2)
SODIUM SERPL-SCNC: 140 MMOL/L (ref 136–145)
WBC # SPEC AUTO: 3 X10(3)/MCL (ref 4.5–11)

## 2018-05-23 ENCOUNTER — HISTORICAL (OUTPATIENT)
Dept: INFUSION THERAPY | Facility: HOSPITAL | Age: 63
End: 2018-05-23

## 2018-05-23 LAB
ABS NEUT (OLG): 1.25 X10(3)/MCL (ref 2.1–9.2)
ALBUMIN SERPL-MCNC: 3 GM/DL (ref 3.4–5)
ALBUMIN/GLOB SERPL: 1 RATIO (ref 1–2)
ALP SERPL-CCNC: 76 UNIT/L (ref 45–117)
ALT SERPL-CCNC: 20 UNIT/L (ref 12–78)
ANISOCYTOSIS BLD QL SMEAR: ABNORMAL
AST SERPL-CCNC: 13 UNIT/L (ref 15–37)
BASOPHILS # BLD AUTO: 0.04 X10(3)/MCL
BASOPHILS NFR BLD AUTO: 2 %
BASOPHILS NFR BLD MANUAL: 2 %
BILIRUB SERPL-MCNC: 0.5 MG/DL (ref 0.2–1)
BILIRUBIN DIRECT+TOT PNL SERPL-MCNC: 0.1 MG/DL
BILIRUBIN DIRECT+TOT PNL SERPL-MCNC: 0.4 MG/DL
BUN SERPL-MCNC: 6 MG/DL (ref 7–18)
CALCIUM SERPL-MCNC: 9.4 MG/DL (ref 8.5–10.1)
CHLORIDE SERPL-SCNC: 110 MMOL/L (ref 98–107)
CO2 SERPL-SCNC: 24 MMOL/L (ref 21–32)
CREAT SERPL-MCNC: 0.9 MG/DL (ref 0.6–1.3)
EOSINOPHIL # BLD AUTO: 0.01 X10(3)/MCL
EOSINOPHIL NFR BLD AUTO: 0 %
EOSINOPHIL NFR BLD MANUAL: 0 %
ERYTHROCYTE [DISTWIDTH] IN BLOOD BY AUTOMATED COUNT: 13.5 % (ref 11.5–14.5)
GLOBULIN SER-MCNC: 3.7 GM/ML (ref 2.3–3.5)
GLUCOSE SERPL-MCNC: 108 MG/DL (ref 74–106)
GRANULOCYTES NFR BLD MANUAL: 56 % (ref 43–75)
HCT VFR BLD AUTO: 34.1 % (ref 35–46)
HGB BLD-MCNC: 11.9 GM/DL (ref 12–16)
LYMPHOCYTES # BLD AUTO: 1.1 X10(3)/MCL
LYMPHOCYTES NFR BLD AUTO: 44 % (ref 13–40)
LYMPHOCYTES NFR BLD MANUAL: 2 %
LYMPHOCYTES NFR BLD MANUAL: 30 % (ref 20.5–51.1)
MACROCYTES BLD QL SMEAR: ABNORMAL
MCH RBC QN AUTO: 39.1 PG (ref 26–34)
MCHC RBC AUTO-ENTMCNC: 34.9 GM/DL (ref 31–37)
MCV RBC AUTO: 112.2 FL (ref 80–100)
METAMYELOCYTES NFR BLD MANUAL: 1 %
MICROCYTES BLD QL SMEAR: ABNORMAL
MONOCYTES # BLD AUTO: 0.08 X10(3)/MCL
MONOCYTES NFR BLD AUTO: 3 % (ref 4–12)
MONOCYTES NFR BLD MANUAL: 6 % (ref 2–9)
NEUTROPHILS # BLD AUTO: 1.25 X10(3)/MCL
NEUTROPHILS NFR BLD AUTO: 50 X10(3)/MCL
NEUTS BAND NFR BLD MANUAL: 3 % (ref 0–10)
PLATELET # BLD AUTO: 137 X10(3)/MCL (ref 130–400)
PLATELET # BLD EST: ADEQUATE 10*3/UL
PMV BLD AUTO: 9.9 FL (ref 7.4–10.4)
POLYCHROMASIA BLD QL SMEAR: ABNORMAL
POTASSIUM SERPL-SCNC: 3.4 MMOL/L (ref 3.5–5.1)
PROT SERPL-MCNC: 6.7 GM/DL (ref 6.4–8.2)
RBC # BLD AUTO: 3.04 X10(6)/MCL (ref 4–5.2)
RBC MORPH BLD: ABNORMAL
SODIUM SERPL-SCNC: 139 MMOL/L (ref 136–145)
WBC # SPEC AUTO: 2.5 X10(3)/MCL (ref 4.5–11)

## 2018-06-07 ENCOUNTER — HISTORICAL (OUTPATIENT)
Dept: ADMINISTRATIVE | Facility: HOSPITAL | Age: 63
End: 2018-06-07

## 2018-06-07 LAB
ABS NEUT (OLG): 2.44 X10(3)/MCL
ALBUMIN SERPL-MCNC: 3.1 GM/DL (ref 3.4–5)
ALBUMIN/GLOB SERPL: 1 RATIO (ref 1–2)
ALP SERPL-CCNC: 71 UNIT/L (ref 45–117)
ALT SERPL-CCNC: 19 UNIT/L (ref 12–78)
AST SERPL-CCNC: 14 UNIT/L (ref 15–37)
BASOPHILS # BLD AUTO: 0.06 X10(3)/MCL
BASOPHILS NFR BLD AUTO: 2 %
BILIRUB SERPL-MCNC: 0.4 MG/DL (ref 0.2–1)
BILIRUBIN DIRECT+TOT PNL SERPL-MCNC: 0.1 MG/DL
BILIRUBIN DIRECT+TOT PNL SERPL-MCNC: 0.3 MG/DL
BUN SERPL-MCNC: 9 MG/DL (ref 7–18)
CALCIUM SERPL-MCNC: 9.8 MG/DL (ref 8.5–10.1)
CHLORIDE SERPL-SCNC: 107 MMOL/L (ref 98–107)
CO2 SERPL-SCNC: 25 MMOL/L (ref 21–32)
CREAT SERPL-MCNC: 0.8 MG/DL (ref 0.6–1.3)
EOSINOPHIL # BLD AUTO: 0.02 X10(3)/MCL
EOSINOPHIL NFR BLD AUTO: 0 %
ERYTHROCYTE [DISTWIDTH] IN BLOOD BY AUTOMATED COUNT: 13.2 % (ref 11.5–14.5)
GLOBULIN SER-MCNC: 3.8 GM/ML (ref 2.3–3.5)
GLUCOSE SERPL-MCNC: 95 MG/DL (ref 74–106)
HCT VFR BLD AUTO: 35.2 % (ref 35–46)
HGB BLD-MCNC: 12.4 GM/DL (ref 12–16)
HYPOCHROMIA BLD QL SMEAR: NORMAL
IMM GRANULOCYTES # BLD AUTO: 0.03 10*3/UL
IMM GRANULOCYTES NFR BLD AUTO: 1 %
LYMPHOCYTES # BLD AUTO: 1.09 X10(3)/MCL
LYMPHOCYTES NFR BLD AUTO: 28 % (ref 13–40)
MACROCYTES BLD QL SMEAR: NORMAL
MCH RBC QN AUTO: 39.2 PG (ref 26–34)
MCHC RBC AUTO-ENTMCNC: 35.2 GM/DL (ref 31–37)
MCV RBC AUTO: 111.4 FL (ref 80–100)
MONOCYTES # BLD AUTO: 0.18 X10(3)/MCL
MONOCYTES NFR BLD AUTO: 5 % (ref 4–12)
NEUTROPHILS # BLD AUTO: 2.44 X10(3)/MCL
NEUTROPHILS NFR BLD AUTO: 64 %
PLATELET # BLD AUTO: 260 X10(3)/MCL (ref 130–400)
PLATELET # BLD EST: ADEQUATE 10*3/UL
PMV BLD AUTO: 9.2 FL (ref 7.4–10.4)
POLYCHROMASIA BLD QL SMEAR: NORMAL
POTASSIUM SERPL-SCNC: 3.5 MMOL/L (ref 3.5–5.1)
PROT SERPL-MCNC: 6.9 GM/DL (ref 6.4–8.2)
RBC # BLD AUTO: 3.16 X10(6)/MCL (ref 4–5.2)
RBC MORPH BLD: NORMAL
SODIUM SERPL-SCNC: 140 MMOL/L (ref 136–145)
WBC # SPEC AUTO: 3.8 X10(3)/MCL (ref 4.5–11)

## 2018-06-22 ENCOUNTER — HISTORICAL (OUTPATIENT)
Dept: INFUSION THERAPY | Facility: HOSPITAL | Age: 63
End: 2018-06-22

## 2018-06-22 LAB
ABS NEUT (OLG): 1.09 X10(3)/MCL
ALBUMIN SERPL-MCNC: 3 GM/DL (ref 3.4–5)
ALBUMIN/GLOB SERPL: 1 RATIO (ref 1–2)
ALP SERPL-CCNC: 86 UNIT/L (ref 45–117)
ALT SERPL-CCNC: 49 UNIT/L (ref 12–78)
ANISOCYTOSIS BLD QL SMEAR: NORMAL
AST SERPL-CCNC: 32 UNIT/L (ref 15–37)
BASOPHILS NFR BLD MANUAL: 0 %
BILIRUB SERPL-MCNC: 0.4 MG/DL (ref 0.2–1)
BILIRUBIN DIRECT+TOT PNL SERPL-MCNC: 0.1 MG/DL
BILIRUBIN DIRECT+TOT PNL SERPL-MCNC: 0.3 MG/DL
BUN SERPL-MCNC: 10 MG/DL (ref 7–18)
CALCIUM SERPL-MCNC: 9 MG/DL (ref 8.5–10.1)
CHLORIDE SERPL-SCNC: 108 MMOL/L (ref 98–107)
CO2 SERPL-SCNC: 23 MMOL/L (ref 21–32)
CREAT SERPL-MCNC: 0.8 MG/DL (ref 0.6–1.3)
EOSINOPHIL NFR BLD MANUAL: 1 %
ERYTHROCYTE [DISTWIDTH] IN BLOOD BY AUTOMATED COUNT: 13.6 % (ref 11.5–14.5)
GLOBULIN SER-MCNC: 3.7 GM/ML (ref 2.3–3.5)
GLUCOSE SERPL-MCNC: 103 MG/DL (ref 74–106)
GRANULOCYTES NFR BLD MANUAL: 61 % (ref 43–75)
HCT VFR BLD AUTO: 34.2 % (ref 35–46)
HGB BLD-MCNC: 12.1 GM/DL (ref 12–16)
HYPOCHROMIA BLD QL SMEAR: NORMAL
LYMPHOCYTES NFR BLD MANUAL: 34 % (ref 20.5–51.1)
MACROCYTES BLD QL SMEAR: NORMAL
MCH RBC QN AUTO: 39 PG (ref 26–34)
MCHC RBC AUTO-ENTMCNC: 35.4 GM/DL (ref 31–37)
MCV RBC AUTO: 110.3 FL (ref 80–100)
MONOCYTES NFR BLD MANUAL: 2 % (ref 2–9)
NEUTROPHILS # BLD AUTO: 1.06 X10(3)/MCL
NEUTS BAND NFR BLD MANUAL: 2 % (ref 0–10)
PLATELET # BLD AUTO: 116 X10(3)/MCL (ref 130–400)
PLATELET # BLD EST: ADEQUATE 10*3/UL
PMV BLD AUTO: 9.7 FL (ref 7.4–10.4)
POTASSIUM SERPL-SCNC: 3.7 MMOL/L (ref 3.5–5.1)
PROT SERPL-MCNC: 6.7 GM/DL (ref 6.4–8.2)
RBC # BLD AUTO: 3.1 X10(6)/MCL (ref 4–5.2)
RBC MORPH BLD: NORMAL
SODIUM SERPL-SCNC: 140 MMOL/L (ref 136–145)
VIT B12 SERPL-MCNC: 530 PG/ML (ref 193–986)
WBC # SPEC AUTO: 1.9 X10(3)/MCL (ref 4.5–11)

## 2018-06-29 ENCOUNTER — HISTORICAL (OUTPATIENT)
Dept: RADIOLOGY | Facility: HOSPITAL | Age: 63
End: 2018-06-29

## 2018-07-05 ENCOUNTER — HISTORICAL (OUTPATIENT)
Dept: ADMINISTRATIVE | Facility: HOSPITAL | Age: 63
End: 2018-07-05

## 2018-07-05 LAB
ABS NEUT (OLG): 2.19 X10(3)/MCL
ALBUMIN SERPL-MCNC: 3.2 GM/DL (ref 3.4–5)
ALBUMIN/GLOB SERPL: 1 RATIO (ref 1–2)
ALP SERPL-CCNC: 75 UNIT/L (ref 45–117)
ALT SERPL-CCNC: 19 UNIT/L (ref 12–78)
ANISOCYTOSIS BLD QL SMEAR: ABNORMAL
AST SERPL-CCNC: 17 UNIT/L (ref 15–37)
BASOPHILS NFR BLD MANUAL: 0 %
BILIRUB SERPL-MCNC: 0.3 MG/DL (ref 0.2–1)
BILIRUBIN DIRECT+TOT PNL SERPL-MCNC: 0.1 MG/DL
BILIRUBIN DIRECT+TOT PNL SERPL-MCNC: 0.2 MG/DL
BUN SERPL-MCNC: 7 MG/DL (ref 7–18)
CALCIUM SERPL-MCNC: 9.4 MG/DL (ref 8.5–10.1)
CHLORIDE SERPL-SCNC: 112 MMOL/L (ref 98–107)
CO2 SERPL-SCNC: 24 MMOL/L (ref 21–32)
CREAT SERPL-MCNC: 0.8 MG/DL (ref 0.6–1.3)
EOSINOPHIL NFR BLD MANUAL: 0 %
ERYTHROCYTE [DISTWIDTH] IN BLOOD BY AUTOMATED COUNT: 13.7 % (ref 11.5–14.5)
FOLATE SERPL-MCNC: 28.6 NG/ML (ref 3.1–17.5)
GLOBULIN SER-MCNC: 3.5 GM/ML (ref 2.3–3.5)
GLUCOSE SERPL-MCNC: 100 MG/DL (ref 74–106)
GRANULOCYTES NFR BLD MANUAL: 57 % (ref 43–75)
HCT VFR BLD AUTO: 32.8 % (ref 35–46)
HGB BLD-MCNC: 11.6 GM/DL (ref 12–16)
HYPOCHROMIA BLD QL SMEAR: ABNORMAL
LYMPHOCYTES NFR BLD MANUAL: 35 % (ref 20.5–51.1)
LYMPHOCYTES NFR BLD MANUAL: 4 %
MACROCYTES BLD QL SMEAR: ABNORMAL
MCH RBC QN AUTO: 39.3 PG (ref 26–34)
MCHC RBC AUTO-ENTMCNC: 35.4 GM/DL (ref 31–37)
MCV RBC AUTO: 111.2 FL (ref 80–100)
MONOCYTES NFR BLD MANUAL: 2 % (ref 2–9)
NEUTS BAND NFR BLD MANUAL: 2 % (ref 0–10)
PLATELET # BLD AUTO: 174 X10(3)/MCL (ref 130–400)
PLATELET # BLD EST: NORMAL 10*3/UL
PMV BLD AUTO: 9.5 FL (ref 7.4–10.4)
POTASSIUM SERPL-SCNC: 3.6 MMOL/L (ref 3.5–5.1)
PROT SERPL-MCNC: 6.7 GM/DL (ref 6.4–8.2)
RBC # BLD AUTO: 2.95 X10(6)/MCL (ref 4–5.2)
RBC MORPH BLD: NORMAL
SODIUM SERPL-SCNC: 141 MMOL/L (ref 136–145)
WBC # SPEC AUTO: 3.8 X10(3)/MCL (ref 4.5–11)

## 2018-07-20 ENCOUNTER — HISTORICAL (OUTPATIENT)
Dept: INFUSION THERAPY | Facility: HOSPITAL | Age: 63
End: 2018-07-20

## 2018-07-20 LAB
ABS NEUT (OLG): 1.14 X10(3)/MCL (ref 2.1–9.2)
ALBUMIN SERPL-MCNC: 3.1 GM/DL (ref 3.4–5)
ALBUMIN/GLOB SERPL: 1 RATIO (ref 1–2)
ALP SERPL-CCNC: 73 UNIT/L (ref 45–117)
ALT SERPL-CCNC: 19 UNIT/L (ref 12–78)
AST SERPL-CCNC: 19 UNIT/L (ref 15–37)
BASOPHILS # BLD AUTO: 0.02 X10(3)/MCL
BASOPHILS NFR BLD AUTO: 1 %
BILIRUB SERPL-MCNC: 0.5 MG/DL (ref 0.2–1)
BILIRUBIN DIRECT+TOT PNL SERPL-MCNC: 0.1 MG/DL
BILIRUBIN DIRECT+TOT PNL SERPL-MCNC: 0.4 MG/DL
BUN SERPL-MCNC: 8 MG/DL (ref 7–18)
CALCIUM SERPL-MCNC: 9.5 MG/DL (ref 8.5–10.1)
CHLORIDE SERPL-SCNC: 108 MMOL/L (ref 98–107)
CO2 SERPL-SCNC: 24 MMOL/L (ref 21–32)
CREAT SERPL-MCNC: 0.9 MG/DL (ref 0.6–1.3)
EOSINOPHIL # BLD AUTO: 0.01 X10(3)/MCL
EOSINOPHIL NFR BLD AUTO: 0 %
ERYTHROCYTE [DISTWIDTH] IN BLOOD BY AUTOMATED COUNT: 14.5 % (ref 11.5–14.5)
GLOBULIN SER-MCNC: 3.5 GM/ML (ref 2.3–3.5)
GLUCOSE SERPL-MCNC: 110 MG/DL (ref 74–106)
HCT VFR BLD AUTO: 34.8 % (ref 35–46)
HGB BLD-MCNC: 12.1 GM/DL (ref 12–16)
IMM GRANULOCYTES # BLD AUTO: 0.01 10*3/UL
IMM GRANULOCYTES NFR BLD AUTO: 0 %
LYMPHOCYTES # BLD AUTO: 0.82 X10(3)/MCL
LYMPHOCYTES NFR BLD AUTO: 39 % (ref 13–40)
MCH RBC QN AUTO: 38.9 PG (ref 26–34)
MCHC RBC AUTO-ENTMCNC: 34.8 GM/DL (ref 31–37)
MCV RBC AUTO: 111.9 FL (ref 80–100)
MONOCYTES # BLD AUTO: 0.11 X10(3)/MCL
MONOCYTES NFR BLD AUTO: 5 % (ref 4–12)
NEUTROPHILS # BLD AUTO: 1.14 X10(3)/MCL
NEUTROPHILS NFR BLD AUTO: 54 X10(3)/MCL
PLATELET # BLD AUTO: 99 X10(3)/MCL (ref 130–400)
PMV BLD AUTO: 9.2 FL (ref 7.4–10.4)
POTASSIUM SERPL-SCNC: 3.8 MMOL/L (ref 3.5–5.1)
PROT SERPL-MCNC: 6.6 GM/DL (ref 6.4–8.2)
RBC # BLD AUTO: 3.11 X10(6)/MCL (ref 4–5.2)
SODIUM SERPL-SCNC: 139 MMOL/L (ref 136–145)
WBC # SPEC AUTO: 2.1 X10(3)/MCL (ref 4.5–11)

## 2018-08-15 ENCOUNTER — HISTORICAL (OUTPATIENT)
Dept: ADMINISTRATIVE | Facility: HOSPITAL | Age: 63
End: 2018-08-15

## 2018-08-15 LAB
ABS NEUT (OLG): 1.11 X10(3)/MCL (ref 2.1–9.2)
ALBUMIN SERPL-MCNC: 3.2 GM/DL (ref 3.4–5)
ALBUMIN/GLOB SERPL: 1 RATIO (ref 1–2)
ALP SERPL-CCNC: 77 UNIT/L (ref 45–117)
ALT SERPL-CCNC: 17 UNIT/L (ref 12–78)
AST SERPL-CCNC: 12 UNIT/L (ref 15–37)
BASOPHILS # BLD AUTO: 0.03 X10(3)/MCL
BASOPHILS NFR BLD AUTO: 1 %
BILIRUB SERPL-MCNC: 0.3 MG/DL (ref 0.2–1)
BILIRUBIN DIRECT+TOT PNL SERPL-MCNC: 0.1 MG/DL
BILIRUBIN DIRECT+TOT PNL SERPL-MCNC: 0.2 MG/DL
BUN SERPL-MCNC: 10 MG/DL (ref 7–18)
CALCIUM SERPL-MCNC: 9.5 MG/DL (ref 8.5–10.1)
CHLORIDE SERPL-SCNC: 108 MMOL/L (ref 98–107)
CO2 SERPL-SCNC: 26 MMOL/L (ref 21–32)
CREAT SERPL-MCNC: 0.9 MG/DL (ref 0.6–1.3)
EOSINOPHIL # BLD AUTO: 0.03 X10(3)/MCL
EOSINOPHIL NFR BLD AUTO: 1 %
ERYTHROCYTE [DISTWIDTH] IN BLOOD BY AUTOMATED COUNT: 14.2 % (ref 11.5–14.5)
GLOBULIN SER-MCNC: 3.7 GM/ML (ref 2.3–3.5)
GLUCOSE SERPL-MCNC: 85 MG/DL (ref 74–106)
HCT VFR BLD AUTO: 33.9 % (ref 35–46)
HGB BLD-MCNC: 11.8 GM/DL (ref 12–16)
IMM GRANULOCYTES # BLD AUTO: 0.01 10*3/UL
IMM GRANULOCYTES NFR BLD AUTO: 0 %
LYMPHOCYTES # BLD AUTO: 1.31 X10(3)/MCL
LYMPHOCYTES NFR BLD AUTO: 49 % (ref 13–40)
MCH RBC QN AUTO: 38.4 PG (ref 26–34)
MCHC RBC AUTO-ENTMCNC: 34.8 GM/DL (ref 31–37)
MCV RBC AUTO: 110.4 FL (ref 80–100)
MONOCYTES # BLD AUTO: 0.18 X10(3)/MCL
MONOCYTES NFR BLD AUTO: 7 % (ref 4–12)
NEUTROPHILS # BLD AUTO: 1.11 X10(3)/MCL
NEUTROPHILS NFR BLD AUTO: 42 X10(3)/MCL
PLATELET # BLD AUTO: 153 X10(3)/MCL (ref 130–400)
PMV BLD AUTO: 9.8 FL (ref 7.4–10.4)
POTASSIUM SERPL-SCNC: 3.5 MMOL/L (ref 3.5–5.1)
PROT SERPL-MCNC: 6.9 GM/DL (ref 6.4–8.2)
RBC # BLD AUTO: 3.07 X10(6)/MCL (ref 4–5.2)
SODIUM SERPL-SCNC: 141 MMOL/L (ref 136–145)
WBC # SPEC AUTO: 2.7 X10(3)/MCL (ref 4.5–11)

## 2018-08-17 ENCOUNTER — HISTORICAL (OUTPATIENT)
Dept: INFUSION THERAPY | Facility: HOSPITAL | Age: 63
End: 2018-08-17

## 2018-09-04 ENCOUNTER — HISTORICAL (OUTPATIENT)
Dept: RADIOLOGY | Facility: HOSPITAL | Age: 63
End: 2018-09-04

## 2018-09-12 ENCOUNTER — HISTORICAL (OUTPATIENT)
Dept: ADMINISTRATIVE | Facility: HOSPITAL | Age: 63
End: 2018-09-12

## 2018-09-12 LAB
ABS NEUT (OLG): 1.09 X10(3)/MCL
ALBUMIN SERPL-MCNC: 3 GM/DL (ref 3.4–5)
ALBUMIN/GLOB SERPL: 1 RATIO (ref 1–2)
ALP SERPL-CCNC: 92 UNIT/L (ref 45–117)
ALT SERPL-CCNC: 16 UNIT/L (ref 12–78)
AST SERPL-CCNC: 19 UNIT/L (ref 15–37)
BASOPHILS NFR BLD MANUAL: 4 %
BILIRUB SERPL-MCNC: 0.4 MG/DL (ref 0.2–1)
BILIRUBIN DIRECT+TOT PNL SERPL-MCNC: 0.1 MG/DL
BILIRUBIN DIRECT+TOT PNL SERPL-MCNC: 0.3 MG/DL
BUN SERPL-MCNC: 5 MG/DL (ref 7–18)
CALCIUM SERPL-MCNC: 8.8 MG/DL (ref 8.5–10.1)
CHLORIDE SERPL-SCNC: 107 MMOL/L (ref 98–107)
CO2 SERPL-SCNC: 23 MMOL/L (ref 21–32)
CREAT SERPL-MCNC: 0.8 MG/DL (ref 0.6–1.3)
EOSINOPHIL NFR BLD MANUAL: 0 %
ERYTHROCYTE [DISTWIDTH] IN BLOOD BY AUTOMATED COUNT: 13.5 % (ref 11.5–14.5)
GLOBULIN SER-MCNC: 4 GM/ML (ref 2.3–3.5)
GLUCOSE SERPL-MCNC: 126 MG/DL (ref 74–106)
GRANULOCYTES NFR BLD MANUAL: 50 % (ref 43–75)
HCT VFR BLD AUTO: 34.7 % (ref 35–46)
HGB BLD-MCNC: 11.8 GM/DL (ref 12–16)
HYPOCHROMIA BLD QL SMEAR: ABNORMAL
LYMPHOCYTES NFR BLD MANUAL: 41 % (ref 20.5–51.1)
MACROCYTES BLD QL SMEAR: ABNORMAL
MCH RBC QN AUTO: 37.5 PG (ref 26–34)
MCHC RBC AUTO-ENTMCNC: 34 GM/DL (ref 31–37)
MCV RBC AUTO: 110.2 FL (ref 80–100)
MONOCYTES NFR BLD MANUAL: 5 % (ref 2–9)
PLATELET # BLD AUTO: 129 X10(3)/MCL (ref 130–400)
PLATELET # BLD EST: ABNORMAL 10*3/UL
PMV BLD AUTO: 10 FL (ref 7.4–10.4)
POLYCHROMASIA BLD QL SMEAR: ABNORMAL
POTASSIUM SERPL-SCNC: 3.6 MMOL/L (ref 3.5–5.1)
PROT SERPL-MCNC: 7 GM/DL (ref 6.4–8.2)
RBC # BLD AUTO: 3.15 X10(6)/MCL (ref 4–5.2)
RBC MORPH BLD: ABNORMAL
SODIUM SERPL-SCNC: 138 MMOL/L (ref 136–145)
WBC # SPEC AUTO: 2.2 X10(3)/MCL (ref 4.5–11)

## 2018-09-17 ENCOUNTER — HISTORICAL (OUTPATIENT)
Dept: INFUSION THERAPY | Facility: HOSPITAL | Age: 63
End: 2018-09-17

## 2018-10-12 ENCOUNTER — HISTORICAL (OUTPATIENT)
Dept: ADMINISTRATIVE | Facility: HOSPITAL | Age: 63
End: 2018-10-12

## 2018-10-12 LAB
ABS NEUT (OLG): 0.62 X10(3)/MCL
ALBUMIN SERPL-MCNC: 3.3 GM/DL (ref 3.4–5)
ALBUMIN/GLOB SERPL: 1 RATIO (ref 1–2)
ALP SERPL-CCNC: 96 UNIT/L (ref 45–117)
ALT SERPL-CCNC: 14 UNIT/L (ref 12–78)
ANISOCYTOSIS BLD QL SMEAR: ABNORMAL
AST SERPL-CCNC: 15 UNIT/L (ref 15–37)
BASOPHILS NFR BLD MANUAL: 4 %
BILIRUB SERPL-MCNC: 0.5 MG/DL (ref 0.2–1)
BILIRUBIN DIRECT+TOT PNL SERPL-MCNC: 0.1 MG/DL
BILIRUBIN DIRECT+TOT PNL SERPL-MCNC: 0.4 MG/DL
BUN SERPL-MCNC: 4 MG/DL (ref 7–18)
CALCIUM SERPL-MCNC: 9.7 MG/DL (ref 8.5–10.1)
CHLORIDE SERPL-SCNC: 106 MMOL/L (ref 98–107)
CO2 SERPL-SCNC: 27 MMOL/L (ref 21–32)
CREAT SERPL-MCNC: 0.8 MG/DL (ref 0.6–1.3)
EOSINOPHIL NFR BLD MANUAL: 0 %
ERYTHROCYTE [DISTWIDTH] IN BLOOD BY AUTOMATED COUNT: 13 % (ref 11.5–14.5)
GLOBULIN SER-MCNC: 4.2 GM/ML (ref 2.3–3.5)
GLUCOSE SERPL-MCNC: 109 MG/DL (ref 74–106)
GRANULOCYTES NFR BLD MANUAL: 42 % (ref 43–75)
HCT VFR BLD AUTO: 33.9 % (ref 35–46)
HGB BLD-MCNC: 11.8 GM/DL (ref 12–16)
HYPOCHROMIA BLD QL SMEAR: ABNORMAL
LYMPHOCYTES NFR BLD MANUAL: 1 %
LYMPHOCYTES NFR BLD MANUAL: 41 % (ref 20.5–51.1)
MACROCYTES BLD QL SMEAR: ABNORMAL
MCH RBC QN AUTO: 37.5 PG (ref 26–34)
MCHC RBC AUTO-ENTMCNC: 34.8 GM/DL (ref 31–37)
MCV RBC AUTO: 107.6 FL (ref 80–100)
MICROCYTES BLD QL SMEAR: ABNORMAL
MONOCYTES NFR BLD MANUAL: 11 % (ref 2–9)
NEUTS BAND NFR BLD MANUAL: 1 % (ref 0–10)
PLATELET # BLD AUTO: 127 X10(3)/MCL (ref 130–400)
PLATELET # BLD EST: ADEQUATE 10*3/UL
PMV BLD AUTO: 10.2 FL (ref 7.4–10.4)
POIKILOCYTOSIS BLD QL SMEAR: ABNORMAL
POLYCHROMASIA BLD QL SMEAR: ABNORMAL
POTASSIUM SERPL-SCNC: 3.4 MMOL/L (ref 3.5–5.1)
PROT SERPL-MCNC: 7.5 GM/DL (ref 6.4–8.2)
RBC # BLD AUTO: 3.15 X10(6)/MCL (ref 4–5.2)
RBC MORPH BLD: ABNORMAL
SODIUM SERPL-SCNC: 139 MMOL/L (ref 136–145)
WBC # SPEC AUTO: 1.6 X10(3)/MCL (ref 4.5–11)

## 2018-10-16 ENCOUNTER — HISTORICAL (OUTPATIENT)
Dept: INFUSION THERAPY | Facility: HOSPITAL | Age: 63
End: 2018-10-16

## 2018-10-16 LAB
ABS NEUT (OLG): 0.79 X10(3)/MCL
ALBUMIN SERPL-MCNC: 3.2 GM/DL (ref 3.4–5)
ALBUMIN/GLOB SERPL: 1 RATIO (ref 1–2)
ALP SERPL-CCNC: 99 UNIT/L (ref 45–117)
ALT SERPL-CCNC: 12 UNIT/L (ref 12–78)
ANISOCYTOSIS BLD QL SMEAR: ABNORMAL
AST SERPL-CCNC: 16 UNIT/L (ref 15–37)
BASOPHILS NFR BLD MANUAL: 2 %
BILIRUB SERPL-MCNC: 0.6 MG/DL (ref 0.2–1)
BILIRUBIN DIRECT+TOT PNL SERPL-MCNC: 0.2 MG/DL
BILIRUBIN DIRECT+TOT PNL SERPL-MCNC: 0.4 MG/DL
BUN SERPL-MCNC: 2 MG/DL (ref 7–18)
CALCIUM SERPL-MCNC: 9 MG/DL (ref 8.5–10.1)
CHLORIDE SERPL-SCNC: 106 MMOL/L (ref 98–107)
CO2 SERPL-SCNC: 26 MMOL/L (ref 21–32)
CREAT SERPL-MCNC: 0.6 MG/DL (ref 0.6–1.3)
DACRYOCYTES BLD QL SMEAR: ABNORMAL
EOSINOPHIL NFR BLD MANUAL: 1 %
ERYTHROCYTE [DISTWIDTH] IN BLOOD BY AUTOMATED COUNT: 13.2 % (ref 11.5–14.5)
GLOBULIN SER-MCNC: 4.2 GM/ML (ref 2.3–3.5)
GLUCOSE SERPL-MCNC: 97 MG/DL (ref 74–106)
GRANULOCYTES NFR BLD MANUAL: 43 % (ref 43–75)
HCT VFR BLD AUTO: 33.4 % (ref 35–46)
HGB BLD-MCNC: 11.7 GM/DL (ref 12–16)
HYPOCHROMIA BLD QL SMEAR: ABNORMAL
LYMPHOCYTES NFR BLD MANUAL: 38 % (ref 20.5–51.1)
LYMPHOCYTES NFR BLD MANUAL: 6 %
MACROCYTES BLD QL SMEAR: ABNORMAL
MCH RBC QN AUTO: 37.3 PG (ref 26–34)
MCHC RBC AUTO-ENTMCNC: 35 GM/DL (ref 31–37)
MCV RBC AUTO: 106.4 FL (ref 80–100)
MICROCYTES BLD QL SMEAR: ABNORMAL
MONOCYTES NFR BLD MANUAL: 9 % (ref 2–9)
NEUTS BAND NFR BLD MANUAL: 1 % (ref 0–10)
PLATELET # BLD AUTO: 114 X10(3)/MCL (ref 130–400)
PLATELET # BLD EST: ABNORMAL 10*3/UL
PMV BLD AUTO: 9.9 FL (ref 7.4–10.4)
POIKILOCYTOSIS BLD QL SMEAR: ABNORMAL
POLYCHROMASIA BLD QL SMEAR: ABNORMAL
POTASSIUM SERPL-SCNC: 3.4 MMOL/L (ref 3.5–5.1)
PROT SERPL-MCNC: 7.4 GM/DL (ref 6.4–8.2)
RBC # BLD AUTO: 3.14 X10(6)/MCL (ref 4–5.2)
RBC MORPH BLD: ABNORMAL
SCHISTOCYTES BLD QL AUTO: ABNORMAL
SODIUM SERPL-SCNC: 139 MMOL/L (ref 136–145)
WBC # SPEC AUTO: 2 X10(3)/MCL (ref 4.5–11)

## 2018-10-23 ENCOUNTER — HISTORICAL (OUTPATIENT)
Dept: HEMATOLOGY/ONCOLOGY | Facility: CLINIC | Age: 63
End: 2018-10-23

## 2018-10-23 LAB
ABS NEUT (OLG): 1.44 X10(3)/MCL (ref 2.1–9.2)
ALBUMIN SERPL-MCNC: 2.7 GM/DL (ref 3.4–5)
ALBUMIN/GLOB SERPL: 1 RATIO (ref 1–2)
ALP SERPL-CCNC: 91 UNIT/L (ref 45–117)
ALT SERPL-CCNC: 14 UNIT/L (ref 12–78)
AST SERPL-CCNC: 23 UNIT/L (ref 15–37)
BASOPHILS # BLD AUTO: 0.1 X10(3)/MCL
BASOPHILS NFR BLD AUTO: 2 %
BILIRUB SERPL-MCNC: 0.4 MG/DL (ref 0.2–1)
BILIRUBIN DIRECT+TOT PNL SERPL-MCNC: 0.1 MG/DL
BILIRUBIN DIRECT+TOT PNL SERPL-MCNC: 0.3 MG/DL
BUN SERPL-MCNC: 6 MG/DL (ref 7–18)
CALCIUM SERPL-MCNC: 6.7 MG/DL (ref 8.5–10.1)
CHLORIDE SERPL-SCNC: 106 MMOL/L (ref 98–107)
CO2 SERPL-SCNC: 27 MMOL/L (ref 21–32)
CREAT SERPL-MCNC: 0.8 MG/DL (ref 0.6–1.3)
EOSINOPHIL # BLD AUTO: 0.02 10*3/UL
EOSINOPHIL NFR BLD AUTO: 0 %
ERYTHROCYTE [DISTWIDTH] IN BLOOD BY AUTOMATED COUNT: 13.4 % (ref 11.5–14.5)
GLOBULIN SER-MCNC: 4.6 GM/ML (ref 2.3–3.5)
GLUCOSE SERPL-MCNC: 94 MG/DL (ref 74–106)
HCT VFR BLD AUTO: 37.4 % (ref 35–46)
HGB BLD-MCNC: 12.5 GM/DL (ref 12–16)
IMM GRANULOCYTES # BLD AUTO: 0.03 10*3/UL
IMM GRANULOCYTES NFR BLD AUTO: 1 %
LYMPHOCYTES # BLD AUTO: 1.77 X10(3)/MCL
LYMPHOCYTES NFR BLD AUTO: 44 % (ref 13–40)
MCH RBC QN AUTO: 36.3 PG (ref 26–34)
MCHC RBC AUTO-ENTMCNC: 33.4 GM/DL (ref 31–37)
MCV RBC AUTO: 108.7 FL (ref 80–100)
MONOCYTES # BLD AUTO: 0.71 X10(3)/MCL
MONOCYTES NFR BLD AUTO: 17 % (ref 4–12)
NEUTROPHILS # BLD AUTO: 1.44 X10(3)/MCL
NEUTROPHILS NFR BLD AUTO: 35 X10(3)/MCL
PLATELET # BLD AUTO: 259 X10(3)/MCL (ref 130–400)
PMV BLD AUTO: 9.3 FL (ref 7.4–10.4)
POTASSIUM SERPL-SCNC: 3.6 MMOL/L (ref 3.5–5.1)
PROT SERPL-MCNC: 7.3 GM/DL (ref 6.4–8.2)
RBC # BLD AUTO: 3.44 X10(6)/MCL (ref 4–5.2)
SODIUM SERPL-SCNC: 140 MMOL/L (ref 136–145)
WBC # SPEC AUTO: 4.1 X10(3)/MCL (ref 4.5–11)

## 2018-11-14 ENCOUNTER — HISTORICAL (OUTPATIENT)
Dept: ADMINISTRATIVE | Facility: HOSPITAL | Age: 63
End: 2018-11-14

## 2018-11-14 LAB
ABS NEUT (OLG): 0.76 X10(3)/MCL (ref 2.1–9.2)
ALBUMIN SERPL-MCNC: 3.6 GM/DL (ref 3.4–5)
ALBUMIN/GLOB SERPL: 1 RATIO (ref 1–2)
ALP SERPL-CCNC: 107 UNIT/L (ref 45–117)
ALT SERPL-CCNC: 19 UNIT/L (ref 12–78)
AST SERPL-CCNC: 20 UNIT/L (ref 15–37)
BASOPHILS # BLD AUTO: 0.02 X10(3)/MCL
BASOPHILS NFR BLD AUTO: 1 %
BILIRUB SERPL-MCNC: 0.5 MG/DL (ref 0.2–1)
BILIRUBIN DIRECT+TOT PNL SERPL-MCNC: 0.2 MG/DL
BILIRUBIN DIRECT+TOT PNL SERPL-MCNC: 0.3 MG/DL
BUN SERPL-MCNC: 5 MG/DL (ref 7–18)
CALCIUM SERPL-MCNC: 9.6 MG/DL (ref 8.5–10.1)
CHLORIDE SERPL-SCNC: 103 MMOL/L (ref 98–107)
CO2 SERPL-SCNC: 26 MMOL/L (ref 21–32)
CREAT SERPL-MCNC: 0.9 MG/DL (ref 0.6–1.3)
EOSINOPHIL # BLD AUTO: 0.01 10*3/UL
EOSINOPHIL NFR BLD AUTO: 0 %
ERYTHROCYTE [DISTWIDTH] IN BLOOD BY AUTOMATED COUNT: 13.2 % (ref 11.5–14.5)
GLOBULIN SER-MCNC: 5.1 GM/ML (ref 2.3–3.5)
GLUCOSE SERPL-MCNC: 107 MG/DL (ref 74–106)
HCT VFR BLD AUTO: 41.1 % (ref 35–46)
HGB BLD-MCNC: 13.8 GM/DL (ref 12–16)
IMM GRANULOCYTES # BLD AUTO: 0.01 10*3/UL
IMM GRANULOCYTES NFR BLD AUTO: 0 %
LYMPHOCYTES # BLD AUTO: 0.99 X10(3)/MCL
LYMPHOCYTES NFR BLD AUTO: 52 % (ref 13–40)
MCH RBC QN AUTO: 35 PG (ref 26–34)
MCHC RBC AUTO-ENTMCNC: 33.6 GM/DL (ref 31–37)
MCV RBC AUTO: 104.3 FL (ref 80–100)
MONOCYTES # BLD AUTO: 0.1 X10(3)/MCL
MONOCYTES NFR BLD AUTO: 5 % (ref 4–12)
NEUTROPHILS # BLD AUTO: 0.76 X10(3)/MCL
NEUTROPHILS NFR BLD AUTO: 40 X10(3)/MCL
PLATELET # BLD AUTO: 117 X10(3)/MCL (ref 130–400)
PMV BLD AUTO: 9.8 FL (ref 7.4–10.4)
POTASSIUM SERPL-SCNC: 4.2 MMOL/L (ref 3.5–5.1)
PROT SERPL-MCNC: 8.7 GM/DL (ref 6.4–8.2)
RBC # BLD AUTO: 3.94 X10(6)/MCL (ref 4–5.2)
SODIUM SERPL-SCNC: 137 MMOL/L (ref 136–145)
WBC # SPEC AUTO: 1.9 X10(3)/MCL (ref 4.5–11)

## 2018-11-15 ENCOUNTER — HISTORICAL (OUTPATIENT)
Dept: INFUSION THERAPY | Facility: HOSPITAL | Age: 63
End: 2018-11-15

## 2018-11-23 ENCOUNTER — HISTORICAL (OUTPATIENT)
Dept: HEMATOLOGY/ONCOLOGY | Facility: CLINIC | Age: 63
End: 2018-11-23

## 2018-11-23 LAB
ABS NEUT (OLG): 1.45 X10(3)/MCL
ALBUMIN SERPL-MCNC: 3.3 GM/DL (ref 3.4–5)
ALBUMIN/GLOB SERPL: 1 RATIO (ref 1–2)
ALP SERPL-CCNC: 101 UNIT/L (ref 45–117)
ALT SERPL-CCNC: 15 UNIT/L (ref 12–78)
ANISOCYTOSIS BLD QL SMEAR: NORMAL
AST SERPL-CCNC: 24 UNIT/L (ref 15–37)
BASOPHILS # BLD AUTO: 0.07 X10(3)/MCL
BASOPHILS NFR BLD AUTO: 2 %
BILIRUB SERPL-MCNC: 0.3 MG/DL (ref 0.2–1)
BILIRUBIN DIRECT+TOT PNL SERPL-MCNC: 0.1 MG/DL
BILIRUBIN DIRECT+TOT PNL SERPL-MCNC: 0.2 MG/DL
BUN SERPL-MCNC: 6 MG/DL (ref 7–18)
CALCIUM SERPL-MCNC: 9.6 MG/DL (ref 8.5–10.1)
CHLORIDE SERPL-SCNC: 104 MMOL/L (ref 98–107)
CO2 SERPL-SCNC: 24 MMOL/L (ref 21–32)
CREAT SERPL-MCNC: 0.8 MG/DL (ref 0.6–1.3)
EOSINOPHIL # BLD AUTO: 0.01 X10(3)/MCL
EOSINOPHIL NFR BLD AUTO: 0 %
ERYTHROCYTE [DISTWIDTH] IN BLOOD BY AUTOMATED COUNT: 14.7 % (ref 11.5–14.5)
GLOBULIN SER-MCNC: 4.6 GM/ML (ref 2.3–3.5)
GLUCOSE SERPL-MCNC: 116 MG/DL (ref 74–106)
HCT VFR BLD AUTO: 35.4 % (ref 35–46)
HGB BLD-MCNC: 11.9 GM/DL (ref 12–16)
IMM GRANULOCYTES # BLD AUTO: 0.01 10*3/UL
IMM GRANULOCYTES NFR BLD AUTO: 0 %
LYMPHOCYTES # BLD AUTO: 1.49 X10(3)/MCL
LYMPHOCYTES NFR BLD AUTO: 40 % (ref 13–40)
MACROCYTES BLD QL SMEAR: NORMAL
MCH RBC QN AUTO: 36.1 PG (ref 26–34)
MCHC RBC AUTO-ENTMCNC: 33.6 GM/DL (ref 31–37)
MCV RBC AUTO: 107.3 FL (ref 80–100)
MICROCYTES BLD QL SMEAR: NORMAL
MONOCYTES # BLD AUTO: 0.69 X10(3)/MCL
MONOCYTES NFR BLD AUTO: 18 % (ref 4–12)
NEUTROPHILS # BLD AUTO: 1.45 X10(3)/MCL
NEUTROPHILS NFR BLD AUTO: 39 %
PLATELET # BLD AUTO: 140 X10(3)/MCL (ref 130–400)
PLATELET # BLD EST: ADEQUATE 10*3/UL
PMV BLD AUTO: 10.7 FL (ref 7.4–10.4)
POIKILOCYTOSIS BLD QL SMEAR: NORMAL
POLYCHROMASIA BLD QL SMEAR: NORMAL
POTASSIUM SERPL-SCNC: 4.1 MMOL/L (ref 3.5–5.1)
PROT SERPL-MCNC: 7.9 GM/DL (ref 6.4–8.2)
RBC # BLD AUTO: 3.3 X10(6)/MCL (ref 4–5.2)
RBC MORPH BLD: NORMAL
SCHISTOCYTES BLD QL AUTO: NORMAL
SODIUM SERPL-SCNC: 137 MMOL/L (ref 136–145)
TARGETS BLD QL SMEAR: NORMAL
WBC # SPEC AUTO: 3.7 X10(3)/MCL (ref 4.5–11)

## 2018-12-12 ENCOUNTER — HISTORICAL (OUTPATIENT)
Dept: RADIOLOGY | Facility: HOSPITAL | Age: 63
End: 2018-12-12

## 2018-12-13 ENCOUNTER — HISTORICAL (OUTPATIENT)
Dept: INFUSION THERAPY | Facility: HOSPITAL | Age: 63
End: 2018-12-13

## 2018-12-13 LAB
ABS NEUT (OLG): 2.51 X10(3)/MCL (ref 2.1–9.2)
ALBUMIN SERPL-MCNC: 3.4 GM/DL (ref 3.4–5)
ALBUMIN/GLOB SERPL: 1 RATIO (ref 1–2)
ALP SERPL-CCNC: 91 UNIT/L (ref 45–117)
ALT SERPL-CCNC: 18 UNIT/L (ref 12–78)
AST SERPL-CCNC: 11 UNIT/L (ref 15–37)
BASOPHILS # BLD AUTO: 0.01 X10(3)/MCL
BASOPHILS NFR BLD AUTO: 0 %
BILIRUB SERPL-MCNC: 0.3 MG/DL (ref 0.2–1)
BILIRUBIN DIRECT+TOT PNL SERPL-MCNC: 0.1 MG/DL
BILIRUBIN DIRECT+TOT PNL SERPL-MCNC: 0.2 MG/DL
BUN SERPL-MCNC: 11 MG/DL (ref 7–18)
CALCIUM SERPL-MCNC: 9.9 MG/DL (ref 8.5–10.1)
CHLORIDE SERPL-SCNC: 108 MMOL/L (ref 98–107)
CO2 SERPL-SCNC: 24 MMOL/L (ref 21–32)
CREAT SERPL-MCNC: 0.7 MG/DL (ref 0.6–1.3)
EOSINOPHIL # BLD AUTO: 0.02 10*3/UL
EOSINOPHIL NFR BLD AUTO: 1 %
ERYTHROCYTE [DISTWIDTH] IN BLOOD BY AUTOMATED COUNT: 15.8 % (ref 11.5–14.5)
GLOBULIN SER-MCNC: 4.5 GM/ML (ref 2.3–3.5)
GLUCOSE SERPL-MCNC: 99 MG/DL (ref 74–106)
HCT VFR BLD AUTO: 37.8 % (ref 35–46)
HGB BLD-MCNC: 12.5 GM/DL (ref 12–16)
IMM GRANULOCYTES # BLD AUTO: 0.02 10*3/UL
IMM GRANULOCYTES NFR BLD AUTO: 1 %
LYMPHOCYTES # BLD AUTO: 0.85 X10(3)/MCL
LYMPHOCYTES NFR BLD AUTO: 24 % (ref 13–40)
MCH RBC QN AUTO: 35.4 PG (ref 26–34)
MCHC RBC AUTO-ENTMCNC: 33.1 GM/DL (ref 31–37)
MCV RBC AUTO: 107.1 FL (ref 80–100)
MONOCYTES # BLD AUTO: 0.19 X10(3)/MCL
MONOCYTES NFR BLD AUTO: 5 % (ref 4–12)
NEUTROPHILS # BLD AUTO: 2.51 X10(3)/MCL
NEUTROPHILS NFR BLD AUTO: 70 X10(3)/MCL
PLATELET # BLD AUTO: 161 X10(3)/MCL (ref 130–400)
PMV BLD AUTO: 9.4 FL (ref 7.4–10.4)
POTASSIUM SERPL-SCNC: 3.5 MMOL/L (ref 3.5–5.1)
PROT SERPL-MCNC: 7.9 GM/DL (ref 6.4–8.2)
RBC # BLD AUTO: 3.53 X10(6)/MCL (ref 4–5.2)
SODIUM SERPL-SCNC: 139 MMOL/L (ref 136–145)
WBC # SPEC AUTO: 3.6 X10(3)/MCL (ref 4.5–11)

## 2019-01-10 ENCOUNTER — HISTORICAL (OUTPATIENT)
Dept: INFUSION THERAPY | Facility: HOSPITAL | Age: 64
End: 2019-01-10

## 2019-01-10 LAB
ABS NEUT (OLG): 1.84 X10(3)/MCL
ALBUMIN SERPL-MCNC: 3.6 GM/DL (ref 3.4–5)
ALBUMIN/GLOB SERPL: 1 RATIO (ref 1–2)
ALP SERPL-CCNC: 110 UNIT/L (ref 45–117)
ALT SERPL-CCNC: 32 UNIT/L (ref 12–78)
ANISOCYTOSIS BLD QL SMEAR: ABNORMAL
AST SERPL-CCNC: 17 UNIT/L (ref 15–37)
BASOPHILS NFR BLD MANUAL: 1 %
BILIRUB SERPL-MCNC: 0.4 MG/DL (ref 0.2–1)
BILIRUBIN DIRECT+TOT PNL SERPL-MCNC: 0.1 MG/DL
BILIRUBIN DIRECT+TOT PNL SERPL-MCNC: 0.3 MG/DL
BUN SERPL-MCNC: 10 MG/DL (ref 7–18)
CALCIUM SERPL-MCNC: 9.6 MG/DL (ref 8.5–10.1)
CHLORIDE SERPL-SCNC: 99 MMOL/L (ref 98–107)
CO2 SERPL-SCNC: 29 MMOL/L (ref 21–32)
CREAT SERPL-MCNC: 0.8 MG/DL (ref 0.6–1.3)
EOSINOPHIL NFR BLD MANUAL: 0 %
ERYTHROCYTE [DISTWIDTH] IN BLOOD BY AUTOMATED COUNT: 17.9 % (ref 11.5–14.5)
GLOBULIN SER-MCNC: 4.9 GM/ML (ref 2.3–3.5)
GLUCOSE SERPL-MCNC: 98 MG/DL (ref 74–106)
GRANULOCYTES NFR BLD MANUAL: 54 % (ref 43–75)
HCT VFR BLD AUTO: 43.2 % (ref 35–46)
HGB BLD-MCNC: 14.5 GM/DL (ref 12–16)
LYMPHOCYTES NFR BLD MANUAL: 35 % (ref 20.5–51.1)
LYMPHOCYTES NFR BLD MANUAL: 5 %
MACROCYTES BLD QL SMEAR: ABNORMAL
MCH RBC QN AUTO: 35.9 PG (ref 26–34)
MCHC RBC AUTO-ENTMCNC: 33.6 GM/DL (ref 31–37)
MCV RBC AUTO: 106.9 FL (ref 80–100)
MONOCYTES NFR BLD MANUAL: 4 % (ref 2–9)
NEUTS BAND NFR BLD MANUAL: 1 % (ref 0–10)
PLATELET # BLD AUTO: 156 X10(3)/MCL (ref 130–400)
PLATELET # BLD EST: ADEQUATE 10*3/UL
PMV BLD AUTO: 9.5 FL (ref 7.4–10.4)
POIKILOCYTOSIS BLD QL SMEAR: ABNORMAL
POLYCHROMASIA BLD QL SMEAR: ABNORMAL
POTASSIUM SERPL-SCNC: 4.3 MMOL/L (ref 3.5–5.1)
PROT SERPL-MCNC: 8.5 GM/DL (ref 6.4–8.2)
RBC # BLD AUTO: 4.04 X10(6)/MCL (ref 4–5.2)
RBC MORPH BLD: ABNORMAL
SODIUM SERPL-SCNC: 134 MMOL/L (ref 136–145)
WBC # SPEC AUTO: 3.3 X10(3)/MCL (ref 4.5–11)

## 2019-02-07 ENCOUNTER — HISTORICAL (OUTPATIENT)
Dept: INFUSION THERAPY | Facility: HOSPITAL | Age: 64
End: 2019-02-07

## 2019-02-07 LAB
ABS NEUT (OLG): 1.97 X10(3)/MCL
ALBUMIN SERPL-MCNC: 3.1 GM/DL (ref 3.4–5)
ALBUMIN/GLOB SERPL: 0.7 RATIO (ref 1.1–2)
ALP SERPL-CCNC: 96 UNIT/L (ref 45–117)
ALT SERPL-CCNC: 25 UNIT/L (ref 12–78)
ANISOCYTOSIS BLD QL SMEAR: ABNORMAL
AST SERPL-CCNC: 13 UNIT/L (ref 15–37)
BASOPHILS NFR BLD MANUAL: 0 %
BILIRUB SERPL-MCNC: 0.3 MG/DL (ref 0.2–1)
BILIRUBIN DIRECT+TOT PNL SERPL-MCNC: <0.1 MG/DL
BILIRUBIN DIRECT+TOT PNL SERPL-MCNC: >0.2 MG/DL
BUN SERPL-MCNC: 12 MG/DL (ref 7–18)
CALCIUM SERPL-MCNC: 9.6 MG/DL (ref 8.5–10.1)
CHLORIDE SERPL-SCNC: 107 MMOL/L (ref 98–107)
CO2 SERPL-SCNC: 27 MMOL/L (ref 21–32)
CREAT SERPL-MCNC: 0.8 MG/DL (ref 0.6–1.3)
EOSINOPHIL NFR BLD MANUAL: 0 %
ERYTHROCYTE [DISTWIDTH] IN BLOOD BY AUTOMATED COUNT: 17.9 % (ref 11.5–14.5)
GLOBULIN SER-MCNC: 4.3 GM/ML (ref 2.3–3.5)
GLUCOSE SERPL-MCNC: 111 MG/DL (ref 74–106)
GRANULOCYTES NFR BLD MANUAL: 67 % (ref 43–75)
HCT VFR BLD AUTO: 37.9 % (ref 35–46)
HGB BLD-MCNC: 13.3 GM/DL (ref 12–16)
HYPOCHROMIA BLD QL SMEAR: ABNORMAL
LYMPHOCYTES NFR BLD MANUAL: 2 %
LYMPHOCYTES NFR BLD MANUAL: 20 % (ref 20.5–51.1)
MACROCYTES BLD QL SMEAR: ABNORMAL
MCH RBC QN AUTO: 37.4 PG (ref 26–34)
MCHC RBC AUTO-ENTMCNC: 35.1 GM/DL (ref 31–37)
MCV RBC AUTO: 106.5 FL (ref 80–100)
MONOCYTES NFR BLD MANUAL: 8 % (ref 2–9)
NEUTS BAND NFR BLD MANUAL: 3 % (ref 0–10)
NRBC BLD MANUAL-RTO: 1 %
PLATELET # BLD AUTO: 141 X10(3)/MCL (ref 130–400)
PLATELET # BLD EST: ADEQUATE 10*3/UL
PMV BLD AUTO: 9.1 FL (ref 7.4–10.4)
POLYCHROMASIA BLD QL SMEAR: ABNORMAL
POTASSIUM SERPL-SCNC: 3.6 MMOL/L (ref 3.5–5.1)
PROT SERPL-MCNC: 7.4 GM/DL (ref 6.4–8.2)
RBC # BLD AUTO: 3.56 X10(6)/MCL (ref 4–5.2)
RBC MORPH BLD: ABNORMAL
SODIUM SERPL-SCNC: 137 MMOL/L (ref 136–145)
WBC # SPEC AUTO: 3.4 X10(3)/MCL (ref 4.5–11)

## 2019-03-12 ENCOUNTER — HISTORICAL (OUTPATIENT)
Dept: ADMINISTRATIVE | Facility: HOSPITAL | Age: 64
End: 2019-03-12

## 2019-03-12 LAB
ABS NEUT (OLG): 1.42 X10(3)/MCL (ref 2.1–9.2)
ALBUMIN SERPL-MCNC: 2.8 GM/DL (ref 3.4–5)
ALBUMIN/GLOB SERPL: 0.6 RATIO (ref 1.1–2)
ALP SERPL-CCNC: 99 UNIT/L (ref 45–117)
ALT SERPL-CCNC: 28 UNIT/L (ref 12–78)
AST SERPL-CCNC: 18 UNIT/L (ref 15–37)
BASOPHILS # BLD AUTO: 0.02 X10(3)/MCL
BASOPHILS NFR BLD AUTO: 1 %
BILIRUB SERPL-MCNC: 0.3 MG/DL (ref 0.2–1)
BILIRUBIN DIRECT+TOT PNL SERPL-MCNC: 0.1 MG/DL
BILIRUBIN DIRECT+TOT PNL SERPL-MCNC: 0.2 MG/DL
BUN SERPL-MCNC: 12 MG/DL (ref 7–18)
CALCIUM SERPL-MCNC: 9.6 MG/DL (ref 8.5–10.1)
CHLORIDE SERPL-SCNC: 110 MMOL/L (ref 98–107)
CO2 SERPL-SCNC: 26 MMOL/L (ref 21–32)
CREAT SERPL-MCNC: 0.9 MG/DL (ref 0.6–1.3)
EOSINOPHIL # BLD AUTO: 0.01 X10(3)/MCL
EOSINOPHIL NFR BLD AUTO: 0 %
ERYTHROCYTE [DISTWIDTH] IN BLOOD BY AUTOMATED COUNT: 15 % (ref 11.5–14.5)
GLOBULIN SER-MCNC: 4.5 GM/ML (ref 2.3–3.5)
GLUCOSE SERPL-MCNC: 140 MG/DL (ref 74–106)
HCT VFR BLD AUTO: 34.2 % (ref 35–46)
HGB BLD-MCNC: 11.9 GM/DL (ref 12–16)
IMM GRANULOCYTES # BLD AUTO: 0.01 10*3/UL
IMM GRANULOCYTES NFR BLD AUTO: 0 %
LYMPHOCYTES # BLD AUTO: 0.81 X10(3)/MCL
LYMPHOCYTES NFR BLD AUTO: 34 % (ref 13–40)
MCH RBC QN AUTO: 38.9 PG (ref 26–34)
MCHC RBC AUTO-ENTMCNC: 34.8 GM/DL (ref 31–37)
MCV RBC AUTO: 111.8 FL (ref 80–100)
MONOCYTES # BLD AUTO: 0.11 X10(3)/MCL
MONOCYTES NFR BLD AUTO: 5 % (ref 4–12)
NEUTROPHILS # BLD AUTO: 1.42 X10(3)/MCL
NEUTROPHILS NFR BLD AUTO: 60 X10(3)/MCL
PLATELET # BLD AUTO: 130 X10(3)/MCL (ref 130–400)
PMV BLD AUTO: 9.7 FL (ref 7.4–10.4)
POTASSIUM SERPL-SCNC: 3.7 MMOL/L (ref 3.5–5.1)
PROT SERPL-MCNC: 7.3 GM/DL (ref 6.4–8.2)
RBC # BLD AUTO: 3.06 X10(6)/MCL (ref 4–5.2)
SODIUM SERPL-SCNC: 141 MMOL/L (ref 136–145)
WBC # SPEC AUTO: 2.4 X10(3)/MCL (ref 4.5–11)

## 2019-03-13 ENCOUNTER — HISTORICAL (OUTPATIENT)
Dept: INFUSION THERAPY | Facility: HOSPITAL | Age: 64
End: 2019-03-13

## 2019-04-09 ENCOUNTER — HISTORICAL (OUTPATIENT)
Dept: ADMINISTRATIVE | Facility: HOSPITAL | Age: 64
End: 2019-04-09

## 2019-04-09 LAB
ABS NEUT (OLG): 2.02 X10(3)/MCL
ALBUMIN SERPL-MCNC: 3.2 GM/DL (ref 3.4–5)
ALBUMIN/GLOB SERPL: 0.7 RATIO (ref 1.1–2)
ALP SERPL-CCNC: 92 UNIT/L (ref 45–117)
ALT SERPL-CCNC: 26 UNIT/L (ref 12–78)
ANISOCYTOSIS BLD QL SMEAR: ABNORMAL
AST SERPL-CCNC: 15 UNIT/L (ref 15–37)
BASOPHILS NFR BLD MANUAL: 0 %
BILIRUB SERPL-MCNC: 0.3 MG/DL (ref 0.2–1)
BILIRUBIN DIRECT+TOT PNL SERPL-MCNC: 0.1 MG/DL
BILIRUBIN DIRECT+TOT PNL SERPL-MCNC: 0.2 MG/DL
BUN SERPL-MCNC: 15 MG/DL (ref 7–18)
CALCIUM SERPL-MCNC: 9.7 MG/DL (ref 8.5–10.1)
CHLORIDE SERPL-SCNC: 109 MMOL/L (ref 98–107)
CO2 SERPL-SCNC: 24 MMOL/L (ref 21–32)
CREAT SERPL-MCNC: 0.7 MG/DL (ref 0.6–1.3)
EOSINOPHIL NFR BLD MANUAL: 2 %
ERYTHROCYTE [DISTWIDTH] IN BLOOD BY AUTOMATED COUNT: 14.5 % (ref 11.5–14.5)
GLOBULIN SER-MCNC: 4.3 GM/ML (ref 2.3–3.5)
GLUCOSE SERPL-MCNC: 122 MG/DL (ref 74–106)
GRANULOCYTES NFR BLD MANUAL: 50 % (ref 43–75)
HCT VFR BLD AUTO: 38.4 % (ref 35–46)
HGB BLD-MCNC: 13.3 GM/DL (ref 12–16)
HYPOCHROMIA BLD QL SMEAR: ABNORMAL
LYMPHOCYTES NFR BLD MANUAL: 35 % (ref 20.5–51.1)
LYMPHOCYTES NFR BLD MANUAL: 7 %
MACROCYTES BLD QL SMEAR: ABNORMAL
MCH RBC QN AUTO: 39.3 PG (ref 26–34)
MCHC RBC AUTO-ENTMCNC: 34.6 GM/DL (ref 31–37)
MCV RBC AUTO: 113.6 FL (ref 80–100)
MONOCYTES NFR BLD MANUAL: 5 % (ref 2–9)
NEUTS BAND NFR BLD MANUAL: 1 % (ref 0–10)
OVALOCYTES BLD QL SMEAR: ABNORMAL
PLATELET # BLD AUTO: 102 X10(3)/MCL (ref 130–400)
PLATELET # BLD EST: ABNORMAL 10*3/UL
PMV BLD AUTO: 9.9 FL (ref 7.4–10.4)
POIKILOCYTOSIS BLD QL SMEAR: ABNORMAL
POLYCHROMASIA BLD QL SMEAR: ABNORMAL
POTASSIUM SERPL-SCNC: 3.4 MMOL/L (ref 3.5–5.1)
PROT SERPL-MCNC: 7.5 GM/DL (ref 6.4–8.2)
RBC # BLD AUTO: 3.38 X10(6)/MCL (ref 4–5.2)
RBC MORPH BLD: ABNORMAL
SODIUM SERPL-SCNC: 141 MMOL/L (ref 136–145)
WBC # SPEC AUTO: 3.9 X10(3)/MCL (ref 4.5–11)

## 2019-04-10 ENCOUNTER — HISTORICAL (OUTPATIENT)
Dept: INFUSION THERAPY | Facility: HOSPITAL | Age: 64
End: 2019-04-10

## 2019-05-09 ENCOUNTER — HISTORICAL (OUTPATIENT)
Dept: ADMINISTRATIVE | Facility: HOSPITAL | Age: 64
End: 2019-05-09

## 2019-05-09 LAB
ABS NEUT (OLG): 1.56 X10(3)/MCL (ref 2.1–9.2)
ALBUMIN SERPL-MCNC: 3 GM/DL (ref 3.4–5)
ALBUMIN/GLOB SERPL: 0.8 RATIO (ref 1.1–2)
ALP SERPL-CCNC: 91 UNIT/L (ref 45–117)
ALT SERPL-CCNC: 27 UNIT/L (ref 12–78)
ANISOCYTOSIS BLD QL SMEAR: ABNORMAL
AST SERPL-CCNC: 13 UNIT/L (ref 15–37)
BASOPHILS NFR BLD MANUAL: 0 %
BILIRUB SERPL-MCNC: 0.3 MG/DL (ref 0.2–1)
BILIRUBIN DIRECT+TOT PNL SERPL-MCNC: 0.1 MG/DL
BILIRUBIN DIRECT+TOT PNL SERPL-MCNC: 0.2 MG/DL
BUN SERPL-MCNC: 18 MG/DL (ref 7–18)
CALCIUM SERPL-MCNC: 9.2 MG/DL (ref 8.5–10.1)
CHLORIDE SERPL-SCNC: 110 MMOL/L (ref 98–107)
CO2 SERPL-SCNC: 22 MMOL/L (ref 21–32)
CREAT SERPL-MCNC: 0.8 MG/DL (ref 0.6–1.3)
EOSINOPHIL NFR BLD MANUAL: 0 %
ERYTHROCYTE [DISTWIDTH] IN BLOOD BY AUTOMATED COUNT: 14.7 % (ref 11.5–14.5)
GLOBULIN SER-MCNC: 3.8 GM/ML (ref 2.3–3.5)
GLUCOSE SERPL-MCNC: 140 MG/DL (ref 74–106)
GRANULOCYTES NFR BLD MANUAL: 58 % (ref 43–75)
HCT VFR BLD AUTO: 36.9 % (ref 35–46)
HGB BLD-MCNC: 13.1 GM/DL (ref 12–16)
LYMPHOCYTES NFR BLD MANUAL: 2 %
LYMPHOCYTES NFR BLD MANUAL: 32 % (ref 20.5–51.1)
MCH RBC QN AUTO: 40.7 PG (ref 26–34)
MCHC RBC AUTO-ENTMCNC: 35.5 GM/DL (ref 31–37)
MCV RBC AUTO: 114.6 FL (ref 80–100)
MONOCYTES NFR BLD MANUAL: 0 % (ref 2–9)
NEUTS BAND NFR BLD MANUAL: 8 % (ref 0–10)
PLATELET # BLD AUTO: 74 X10(3)/MCL (ref 130–400)
PLATELET # BLD EST: ABNORMAL 10*3/UL
PMV BLD AUTO: 10.7 FL (ref 7.4–10.4)
POLYCHROMASIA BLD QL SMEAR: ABNORMAL
POTASSIUM SERPL-SCNC: 3.8 MMOL/L (ref 3.5–5.1)
PROT SERPL-MCNC: 6.8 GM/DL (ref 6.4–8.2)
RBC # BLD AUTO: 3.22 X10(6)/MCL (ref 4–5.2)
RBC MORPH BLD: ABNORMAL
SODIUM SERPL-SCNC: 140 MMOL/L (ref 136–145)
WBC # SPEC AUTO: 2.9 X10(3)/MCL (ref 4.5–11)

## 2019-05-10 ENCOUNTER — HISTORICAL (OUTPATIENT)
Dept: INFUSION THERAPY | Facility: HOSPITAL | Age: 64
End: 2019-05-10

## 2019-06-10 ENCOUNTER — HISTORICAL (OUTPATIENT)
Dept: ADMINISTRATIVE | Facility: HOSPITAL | Age: 64
End: 2019-06-10

## 2019-06-10 LAB
ABS NEUT (OLG): 1.26 X10(3)/MCL
ALBUMIN SERPL-MCNC: 3.2 GM/DL (ref 3.4–5)
ALBUMIN/GLOB SERPL: 0.7 RATIO (ref 1.1–2)
ALP SERPL-CCNC: 85 UNIT/L (ref 45–117)
ALT SERPL-CCNC: 21 UNIT/L (ref 12–78)
ANISOCYTOSIS BLD QL SMEAR: ABNORMAL
AST SERPL-CCNC: 16 UNIT/L (ref 15–37)
BASOPHILS NFR BLD MANUAL: 0 %
BILIRUB SERPL-MCNC: 1.1 MG/DL (ref 0.2–1)
BILIRUBIN DIRECT+TOT PNL SERPL-MCNC: 0.3 MG/DL
BILIRUBIN DIRECT+TOT PNL SERPL-MCNC: 0.8 MG/DL
BUN SERPL-MCNC: 10 MG/DL (ref 7–18)
CALCIUM SERPL-MCNC: 9.4 MG/DL (ref 8.5–10.1)
CHLORIDE SERPL-SCNC: 103 MMOL/L (ref 98–107)
CO2 SERPL-SCNC: 27 MMOL/L (ref 21–32)
CREAT SERPL-MCNC: 0.9 MG/DL (ref 0.6–1.3)
DACRYOCYTES BLD QL SMEAR: ABNORMAL
EOSINOPHIL NFR BLD MANUAL: 0 %
ERYTHROCYTE [DISTWIDTH] IN BLOOD BY AUTOMATED COUNT: 14.9 % (ref 11.5–14.5)
GLOBULIN SER-MCNC: 4.3 GM/ML (ref 2.3–3.5)
GLUCOSE SERPL-MCNC: 146 MG/DL (ref 74–106)
GRANULOCYTES NFR BLD MANUAL: 21 % (ref 43–75)
HCT VFR BLD AUTO: 40.1 % (ref 35–46)
HGB BLD-MCNC: 14 GM/DL (ref 12–16)
LYMPHOCYTES NFR BLD MANUAL: 2 %
LYMPHOCYTES NFR BLD MANUAL: 75 % (ref 20.5–51.1)
MACROCYTES BLD QL SMEAR: ABNORMAL
MCH RBC QN AUTO: 40.5 PG (ref 26–34)
MCHC RBC AUTO-ENTMCNC: 34.9 GM/DL (ref 31–37)
MCV RBC AUTO: 115.9 FL (ref 80–100)
MONOCYTES NFR BLD MANUAL: 2 % (ref 2–9)
PLATELET # BLD AUTO: 80 X10(3)/MCL (ref 130–400)
PLATELET # BLD EST: ABNORMAL 10*3/UL
PMV BLD AUTO: 11.2 FL (ref 7.4–10.4)
POLYCHROMASIA BLD QL SMEAR: ABNORMAL
POTASSIUM SERPL-SCNC: 3.8 MMOL/L (ref 3.5–5.1)
PROT SERPL-MCNC: 7.5 GM/DL (ref 6.4–8.2)
RBC # BLD AUTO: 3.46 X10(6)/MCL (ref 4–5.2)
RBC MORPH BLD: ABNORMAL
SODIUM SERPL-SCNC: 136 MMOL/L (ref 136–145)
WBC # SPEC AUTO: 3.6 X10(3)/MCL (ref 4.5–11)

## 2019-06-17 ENCOUNTER — HISTORICAL (OUTPATIENT)
Dept: RADIOLOGY | Facility: HOSPITAL | Age: 64
End: 2019-06-17

## 2019-06-19 ENCOUNTER — HISTORICAL (OUTPATIENT)
Dept: INFUSION THERAPY | Facility: HOSPITAL | Age: 64
End: 2019-06-19

## 2019-07-16 ENCOUNTER — HISTORICAL (OUTPATIENT)
Dept: ADMINISTRATIVE | Facility: HOSPITAL | Age: 64
End: 2019-07-16

## 2019-07-16 LAB
ABS NEUT (OLG): 0.94 X10(3)/MCL (ref 2.1–9.2)
ALBUMIN SERPL-MCNC: 2.8 GM/DL (ref 3.4–5)
ALBUMIN/GLOB SERPL: 0.6 RATIO (ref 1.1–2)
ALP SERPL-CCNC: 93 UNIT/L (ref 45–117)
ALT SERPL-CCNC: 24 UNIT/L (ref 12–78)
AST SERPL-CCNC: 17 UNIT/L (ref 15–37)
BASOPHILS # BLD AUTO: 0.02 X10(3)/MCL
BASOPHILS NFR BLD AUTO: 1 %
BILIRUB SERPL-MCNC: 0.6 MG/DL (ref 0.2–1)
BILIRUBIN DIRECT+TOT PNL SERPL-MCNC: 0.2 MG/DL
BILIRUBIN DIRECT+TOT PNL SERPL-MCNC: 0.4 MG/DL
BUN SERPL-MCNC: 12 MG/DL (ref 7–18)
CALCIUM SERPL-MCNC: 9.6 MG/DL (ref 8.5–10.1)
CHLORIDE SERPL-SCNC: 108 MMOL/L (ref 98–107)
CO2 SERPL-SCNC: 29 MMOL/L (ref 21–32)
CREAT SERPL-MCNC: 0.7 MG/DL (ref 0.6–1.3)
EOSINOPHIL # BLD AUTO: 0.01 10*3/UL
EOSINOPHIL NFR BLD AUTO: 0 %
ERYTHROCYTE [DISTWIDTH] IN BLOOD BY AUTOMATED COUNT: 14.6 % (ref 11.5–14.5)
GLOBULIN SER-MCNC: 4.4 GM/ML (ref 2.3–3.5)
GLUCOSE SERPL-MCNC: 154 MG/DL (ref 74–106)
HCT VFR BLD AUTO: 37.3 % (ref 35–46)
HGB BLD-MCNC: 12.2 GM/DL (ref 12–16)
IMM GRANULOCYTES # BLD AUTO: 0.01 10*3/UL
IMM GRANULOCYTES NFR BLD AUTO: 0 %
LYMPHOCYTES # BLD AUTO: 1.23 X10(3)/MCL
LYMPHOCYTES NFR BLD AUTO: 53 % (ref 13–40)
MCH RBC QN AUTO: 36.9 PG (ref 26–34)
MCHC RBC AUTO-ENTMCNC: 32.7 GM/DL (ref 31–37)
MCV RBC AUTO: 112.7 FL (ref 80–100)
MONOCYTES # BLD AUTO: 0.1 X10(3)/MCL
MONOCYTES NFR BLD AUTO: 4 % (ref 4–12)
NEUTROPHILS # BLD AUTO: 0.94 X10(3)/MCL
NEUTROPHILS NFR BLD AUTO: 41 X10(3)/MCL
PLATELET # BLD AUTO: 129 X10(3)/MCL (ref 130–400)
PMV BLD AUTO: 9.5 FL (ref 7.4–10.4)
POTASSIUM SERPL-SCNC: 4.3 MMOL/L (ref 3.5–5.1)
PROT SERPL-MCNC: 7.2 GM/DL (ref 6.4–8.2)
RBC # BLD AUTO: 3.31 X10(6)/MCL (ref 4–5.2)
SODIUM SERPL-SCNC: 139 MMOL/L (ref 136–145)
WBC # SPEC AUTO: 2.3 X10(3)/MCL (ref 4.5–11)

## 2019-07-17 ENCOUNTER — HISTORICAL (OUTPATIENT)
Dept: INFUSION THERAPY | Facility: HOSPITAL | Age: 64
End: 2019-07-17

## 2019-08-13 ENCOUNTER — HISTORICAL (OUTPATIENT)
Dept: ADMINISTRATIVE | Facility: HOSPITAL | Age: 64
End: 2019-08-13

## 2019-08-13 LAB
ABS NEUT (OLG): 1.38 X10(3)/MCL
ALBUMIN SERPL-MCNC: 3.1 GM/DL (ref 3.4–5)
ALBUMIN/GLOB SERPL: 0.7 RATIO (ref 1.1–2)
ALP SERPL-CCNC: 93 UNIT/L (ref 45–117)
ALT SERPL-CCNC: 19 UNIT/L (ref 12–78)
ANISOCYTOSIS BLD QL SMEAR: ABNORMAL
AST SERPL-CCNC: 22 UNIT/L (ref 15–37)
BASOPHILS NFR BLD MANUAL: 1 %
BILIRUB SERPL-MCNC: 0.4 MG/DL (ref 0.2–1)
BUN SERPL-MCNC: 9 MG/DL (ref 7–18)
CALCIUM SERPL-MCNC: 9.9 MG/DL (ref 8.5–10.1)
CHLORIDE SERPL-SCNC: 107 MMOL/L (ref 98–107)
CO2 SERPL-SCNC: 25 MMOL/L (ref 21–32)
CREAT SERPL-MCNC: 0.9 MG/DL (ref 0.6–1.3)
EOSINOPHIL NFR BLD MANUAL: 1 %
ERYTHROCYTE [DISTWIDTH] IN BLOOD BY AUTOMATED COUNT: 14.9 % (ref 11.5–14.5)
GLOBULIN SER-MCNC: 4.7 GM/ML (ref 2.3–3.5)
GLUCOSE SERPL-MCNC: 126 MG/DL (ref 74–106)
GRANULOCYTES NFR BLD MANUAL: 61 % (ref 43–75)
HCT VFR BLD AUTO: 38.8 % (ref 35–46)
HGB BLD-MCNC: 12.8 GM/DL (ref 12–16)
HYPOCHROMIA BLD QL SMEAR: ABNORMAL
LYMPHOCYTES NFR BLD MANUAL: 23 % (ref 20.5–51.1)
LYMPHOCYTES NFR BLD MANUAL: 7 %
MACROCYTES BLD QL SMEAR: ABNORMAL
MCH RBC QN AUTO: 36.9 PG (ref 26–34)
MCHC RBC AUTO-ENTMCNC: 33 GM/DL (ref 31–37)
MCV RBC AUTO: 111.8 FL (ref 80–100)
MICROCYTES BLD QL SMEAR: ABNORMAL
MONOCYTES NFR BLD MANUAL: 1 % (ref 2–9)
NEUTS BAND NFR BLD MANUAL: 6 % (ref 0–10)
OVALOCYTES BLD QL SMEAR: ABNORMAL
PLATELET # BLD AUTO: 187 X10(3)/MCL (ref 130–400)
PLATELET # BLD EST: ADEQUATE 10*3/UL
PMV BLD AUTO: 10.3 FL (ref 7.4–10.4)
POIKILOCYTOSIS BLD QL SMEAR: ABNORMAL
POLYCHROMASIA BLD QL SMEAR: ABNORMAL
POTASSIUM SERPL-SCNC: 3.6 MMOL/L (ref 3.5–5.1)
PROT SERPL-MCNC: 7.8 GM/DL (ref 6.4–8.2)
RBC # BLD AUTO: 3.47 X10(6)/MCL (ref 4–5.2)
RBC MORPH BLD: ABNORMAL
SODIUM SERPL-SCNC: 138 MMOL/L (ref 136–145)
WBC # SPEC AUTO: 2.2 X10(3)/MCL (ref 4.5–11)

## 2019-08-14 ENCOUNTER — HISTORICAL (OUTPATIENT)
Dept: INFUSION THERAPY | Facility: HOSPITAL | Age: 64
End: 2019-08-14

## 2019-09-10 ENCOUNTER — HISTORICAL (OUTPATIENT)
Dept: ADMINISTRATIVE | Facility: HOSPITAL | Age: 64
End: 2019-09-10

## 2019-09-10 LAB
ABS NEUT (OLG): 3.38 X10(3)/MCL (ref 2.1–9.2)
ALBUMIN SERPL-MCNC: 3.2 GM/DL (ref 3.4–5)
ALBUMIN/GLOB SERPL: 0.8 RATIO (ref 1.1–2)
ALP SERPL-CCNC: 90 UNIT/L (ref 45–117)
ALT SERPL-CCNC: 24 UNIT/L (ref 12–78)
AST SERPL-CCNC: 13 UNIT/L (ref 15–37)
BILIRUB SERPL-MCNC: 0.2 MG/DL (ref 0.2–1)
BILIRUBIN DIRECT+TOT PNL SERPL-MCNC: 0.1 MG/DL
BILIRUBIN DIRECT+TOT PNL SERPL-MCNC: 0.1 MG/DL (ref 0–0.2)
BUN SERPL-MCNC: 24 MG/DL (ref 7–18)
CALCIUM SERPL-MCNC: 9.8 MG/DL (ref 8.5–10.1)
CHLORIDE SERPL-SCNC: 107 MMOL/L (ref 98–107)
CO2 SERPL-SCNC: 24 MMOL/L (ref 21–32)
CREAT SERPL-MCNC: 0.8 MG/DL (ref 0.6–1.3)
EOSINOPHIL # BLD AUTO: 0 X10(3)/MCL (ref 0–0.9)
EOSINOPHIL NFR BLD AUTO: 0 %
ERYTHROCYTE [DISTWIDTH] IN BLOOD BY AUTOMATED COUNT: 16.2 % (ref 11.5–14.5)
GLOBULIN SER-MCNC: 4.1 GM/ML (ref 2.3–3.5)
GLUCOSE SERPL-MCNC: 146 MG/DL (ref 74–106)
HCT VFR BLD AUTO: 41.6 % (ref 35–46)
HGB BLD-MCNC: 13.9 GM/DL (ref 12–16)
IMM GRANULOCYTES # BLD AUTO: 0.04 10*3/UL
IMM GRANULOCYTES NFR BLD AUTO: 1 %
LYMPHOCYTES # BLD AUTO: 1.4 X10(3)/MCL (ref 0.6–4.6)
LYMPHOCYTES NFR BLD AUTO: 27 %
MCH RBC QN AUTO: 35.4 PG (ref 26–34)
MCHC RBC AUTO-ENTMCNC: 33.4 GM/DL (ref 31–37)
MCV RBC AUTO: 105.9 FL (ref 80–100)
MONOCYTES # BLD AUTO: 0.2 X10(3)/MCL (ref 0.1–1.3)
MONOCYTES NFR BLD AUTO: 3 %
NEUTROPHILS # BLD AUTO: 3.38 X10(3)/MCL (ref 2.1–9.2)
NEUTROPHILS NFR BLD AUTO: 68 %
PLATELET # BLD AUTO: 193 X10(3)/MCL (ref 130–400)
PMV BLD AUTO: 9.9 FL (ref 7.4–10.4)
POTASSIUM SERPL-SCNC: 4 MMOL/L (ref 3.5–5.1)
PROT SERPL-MCNC: 7.3 GM/DL (ref 6.4–8.2)
RBC # BLD AUTO: 3.93 X10(6)/MCL (ref 4–5.2)
SODIUM SERPL-SCNC: 138 MMOL/L (ref 136–145)
WBC # SPEC AUTO: 4.9 X10(3)/MCL (ref 4.5–11)

## 2019-09-11 ENCOUNTER — HISTORICAL (OUTPATIENT)
Dept: INFUSION THERAPY | Facility: HOSPITAL | Age: 64
End: 2019-09-11

## 2019-09-18 ENCOUNTER — HISTORICAL (OUTPATIENT)
Dept: RADIOLOGY | Facility: HOSPITAL | Age: 64
End: 2019-09-18

## 2019-10-03 ENCOUNTER — HISTORICAL (OUTPATIENT)
Dept: RADIOLOGY | Facility: HOSPITAL | Age: 64
End: 2019-10-03

## 2019-10-07 ENCOUNTER — HISTORICAL (OUTPATIENT)
Dept: ADMINISTRATIVE | Facility: HOSPITAL | Age: 64
End: 2019-10-07

## 2019-10-07 LAB
ABS NEUT (OLG): 2.37 X10(3)/MCL (ref 2.1–9.2)
ALBUMIN SERPL-MCNC: 3.1 GM/DL (ref 3.4–5)
ALBUMIN/GLOB SERPL: 0.7 RATIO (ref 1.1–2)
ALP SERPL-CCNC: 102 UNIT/L (ref 45–117)
ALT SERPL-CCNC: 30 UNIT/L (ref 12–78)
ANISOCYTOSIS BLD QL SMEAR: NORMAL
AST SERPL-CCNC: 22 UNIT/L (ref 15–37)
BASOPHILS # BLD AUTO: 0 X10(3)/MCL (ref 0–0.2)
BASOPHILS NFR BLD AUTO: 0 %
BILIRUB SERPL-MCNC: 0.4 MG/DL (ref 0.2–1)
BILIRUBIN DIRECT+TOT PNL SERPL-MCNC: <0.1 MG/DL (ref 0–0.2)
BILIRUBIN DIRECT+TOT PNL SERPL-MCNC: >0.3 MG/DL
BUN SERPL-MCNC: 11 MG/DL (ref 7–18)
CALCIUM SERPL-MCNC: 10 MG/DL (ref 8.5–10.1)
CHLORIDE SERPL-SCNC: 108 MMOL/L (ref 98–107)
CO2 SERPL-SCNC: 24 MMOL/L (ref 21–32)
CREAT SERPL-MCNC: 0.7 MG/DL (ref 0.6–1.3)
DACRYOCYTES BLD QL SMEAR: NORMAL
EOSINOPHIL # BLD AUTO: 0 X10(3)/MCL (ref 0–0.9)
EOSINOPHIL NFR BLD AUTO: 1 %
ERYTHROCYTE [DISTWIDTH] IN BLOOD BY AUTOMATED COUNT: 16.7 % (ref 11.5–14.5)
GLOBULIN SER-MCNC: 4.3 GM/ML (ref 2.3–3.5)
GLUCOSE SERPL-MCNC: 127 MG/DL (ref 74–106)
HCT VFR BLD AUTO: 46.9 % (ref 35–46)
HGB BLD-MCNC: 15.3 GM/DL (ref 12–16)
HYPOCHROMIA BLD QL SMEAR: NORMAL
IMM GRANULOCYTES # BLD AUTO: 0.02 10*3/UL
IMM GRANULOCYTES NFR BLD AUTO: 0 %
LYMPHOCYTES # BLD AUTO: 1.6 X10(3)/MCL (ref 0.6–4.6)
LYMPHOCYTES NFR BLD AUTO: 37 %
MACROCYTES BLD QL SMEAR: NORMAL
MCH RBC QN AUTO: 37 PG (ref 26–34)
MCHC RBC AUTO-ENTMCNC: 32.6 GM/DL (ref 31–37)
MCV RBC AUTO: 113.3 FL (ref 80–100)
MONOCYTES # BLD AUTO: 0.2 X10(3)/MCL (ref 0.1–1.3)
MONOCYTES NFR BLD AUTO: 4 %
NEUTROPHILS # BLD AUTO: 2.37 X10(3)/MCL (ref 2.1–9.2)
NEUTROPHILS NFR BLD AUTO: 57 %
OVALOCYTES BLD QL SMEAR: NORMAL
PLATELET # BLD AUTO: 219 X10(3)/MCL (ref 130–400)
PLATELET # BLD EST: ADEQUATE 10*3/UL
PMV BLD AUTO: 9.8 FL (ref 7.4–10.4)
POIKILOCYTOSIS BLD QL SMEAR: NORMAL
POLYCHROMASIA BLD QL SMEAR: NORMAL
POTASSIUM SERPL-SCNC: 3.7 MMOL/L (ref 3.5–5.1)
PROT SERPL-MCNC: 7.4 GM/DL (ref 6.4–8.2)
RBC # BLD AUTO: 4.14 X10(6)/MCL (ref 4–5.2)
RBC MORPH BLD: NORMAL
SCHISTOCYTES BLD QL AUTO: NORMAL
SODIUM SERPL-SCNC: 137 MMOL/L (ref 136–145)
WBC # SPEC AUTO: 4.2 X10(3)/MCL (ref 4.5–11)

## 2019-10-09 ENCOUNTER — HISTORICAL (OUTPATIENT)
Dept: INFUSION THERAPY | Facility: HOSPITAL | Age: 64
End: 2019-10-09

## 2019-10-10 ENCOUNTER — HISTORICAL (OUTPATIENT)
Dept: RADIOLOGY | Facility: HOSPITAL | Age: 64
End: 2019-10-10

## 2019-11-06 ENCOUNTER — HISTORICAL (OUTPATIENT)
Dept: INFUSION THERAPY | Facility: HOSPITAL | Age: 64
End: 2019-11-06

## 2019-12-05 ENCOUNTER — HISTORICAL (OUTPATIENT)
Dept: ADMINISTRATIVE | Facility: HOSPITAL | Age: 64
End: 2019-12-05

## 2019-12-05 LAB
ABS NEUT (OLG): 0.77 X10(3)/MCL (ref 2.1–9.2)
ALBUMIN SERPL-MCNC: 2.8 GM/DL (ref 3.4–5)
ALBUMIN/GLOB SERPL: 0.7 RATIO (ref 1.1–2)
ALP SERPL-CCNC: 91 UNIT/L (ref 45–117)
ALT SERPL-CCNC: 46 UNIT/L (ref 12–78)
ANISOCYTOSIS BLD QL SMEAR: ABNORMAL
AST SERPL-CCNC: 30 UNIT/L (ref 15–37)
BASOPHILS NFR BLD MANUAL: 0 %
BILIRUB SERPL-MCNC: 0.6 MG/DL (ref 0.2–1)
BILIRUBIN DIRECT+TOT PNL SERPL-MCNC: 0.2 MG/DL (ref 0–0.2)
BILIRUBIN DIRECT+TOT PNL SERPL-MCNC: 0.4 MG/DL
BUN SERPL-MCNC: 14 MG/DL (ref 7–18)
CALCIUM SERPL-MCNC: 9.7 MG/DL (ref 8.5–10.1)
CHLORIDE SERPL-SCNC: 108 MMOL/L (ref 98–107)
CO2 SERPL-SCNC: 25 MMOL/L (ref 21–32)
CREAT SERPL-MCNC: 0.8 MG/DL (ref 0.6–1.3)
EOSINOPHIL NFR BLD MANUAL: 0 %
ERYTHROCYTE [DISTWIDTH] IN BLOOD BY AUTOMATED COUNT: 14.6 % (ref 11.5–14.5)
GLOBULIN SER-MCNC: 4.2 GM/ML (ref 2.3–3.5)
GLUCOSE SERPL-MCNC: 120 MG/DL (ref 74–106)
GRANULOCYTES NFR BLD MANUAL: 43 % (ref 43–75)
HCT VFR BLD AUTO: 38.8 % (ref 35–46)
HGB BLD-MCNC: 12.9 GM/DL (ref 12–16)
LYMPHOCYTES NFR BLD MANUAL: 53 % (ref 20.5–51.1)
MACROCYTES BLD QL SMEAR: ABNORMAL
MCH RBC QN AUTO: 37.7 PG (ref 26–34)
MCHC RBC AUTO-ENTMCNC: 33.2 GM/DL (ref 31–37)
MCV RBC AUTO: 113.5 FL (ref 80–100)
MONOCYTES NFR BLD MANUAL: 0 % (ref 2–9)
NEUTS BAND NFR BLD MANUAL: 4 % (ref 0–10)
PLATELET # BLD AUTO: 102 X10(3)/MCL (ref 130–400)
PLATELET # BLD EST: NORMAL 10*3/UL
PMV BLD AUTO: 9.7 FL (ref 7.4–10.4)
POTASSIUM SERPL-SCNC: 4.2 MMOL/L (ref 3.5–5.1)
PROT SERPL-MCNC: 7 GM/DL (ref 6.4–8.2)
RBC # BLD AUTO: 3.42 X10(6)/MCL (ref 4–5.2)
RBC MORPH BLD: ABNORMAL
SODIUM SERPL-SCNC: 139 MMOL/L (ref 136–145)
WBC # SPEC AUTO: 2.3 X10(3)/MCL (ref 4.5–11)

## 2019-12-06 ENCOUNTER — HISTORICAL (OUTPATIENT)
Dept: INFUSION THERAPY | Facility: HOSPITAL | Age: 64
End: 2019-12-06

## 2019-12-12 ENCOUNTER — HISTORICAL (OUTPATIENT)
Dept: ADMINISTRATIVE | Facility: HOSPITAL | Age: 64
End: 2019-12-12

## 2019-12-12 LAB
ABS NEUT (OLG): 0.88 X10(3)/MCL (ref 2.1–9.2)
ALBUMIN SERPL-MCNC: 2.6 GM/DL (ref 3.4–5)
ALBUMIN/GLOB SERPL: 0.6 RATIO (ref 1.1–2)
ALP SERPL-CCNC: 83 UNIT/L (ref 45–117)
ALT SERPL-CCNC: 24 UNIT/L (ref 12–78)
ANISOCYTOSIS BLD QL SMEAR: ABNORMAL
AST SERPL-CCNC: 19 UNIT/L (ref 15–37)
BASOPHILS NFR BLD MANUAL: 0 %
BILIRUB SERPL-MCNC: 0.3 MG/DL (ref 0.2–1)
BILIRUBIN DIRECT+TOT PNL SERPL-MCNC: 0.1 MG/DL (ref 0–0.2)
BILIRUBIN DIRECT+TOT PNL SERPL-MCNC: 0.2 MG/DL
BUN SERPL-MCNC: 7 MG/DL (ref 7–18)
CALCIUM SERPL-MCNC: 10.4 MG/DL (ref 8.5–10.1)
CHLORIDE SERPL-SCNC: 110 MMOL/L (ref 98–107)
CO2 SERPL-SCNC: 28 MMOL/L (ref 21–32)
CREAT SERPL-MCNC: 0.6 MG/DL (ref 0.6–1.3)
EOSINOPHIL NFR BLD MANUAL: 0 %
ERYTHROCYTE [DISTWIDTH] IN BLOOD BY AUTOMATED COUNT: 13.9 % (ref 11.5–14.5)
GLOBULIN SER-MCNC: 4.1 GM/ML (ref 2.3–3.5)
GLUCOSE SERPL-MCNC: 115 MG/DL (ref 74–106)
GRANULOCYTES NFR BLD MANUAL: 35 % (ref 43–75)
HCT VFR BLD AUTO: 34.1 % (ref 35–46)
HGB BLD-MCNC: 11.2 GM/DL (ref 12–16)
LYMPHOCYTES NFR BLD MANUAL: 58 % (ref 20.5–51.1)
MCH RBC QN AUTO: 38.5 PG (ref 26–34)
MCHC RBC AUTO-ENTMCNC: 32.8 GM/DL (ref 31–37)
MCV RBC AUTO: 117.2 FL (ref 80–100)
MONOCYTES NFR BLD MANUAL: 6 % (ref 2–9)
NEUTS BAND NFR BLD MANUAL: 1 % (ref 0–10)
PLATELET # BLD AUTO: 117 X10(3)/MCL (ref 130–400)
PLATELET # BLD EST: ADEQUATE 10*3/UL
PMV BLD AUTO: 9.8 FL (ref 7.4–10.4)
POTASSIUM SERPL-SCNC: 4.3 MMOL/L (ref 3.5–5.1)
PROT SERPL-MCNC: 6.7 GM/DL (ref 6.4–8.2)
RBC # BLD AUTO: 2.91 X10(6)/MCL (ref 4–5.2)
RBC MORPH BLD: ABNORMAL
SODIUM SERPL-SCNC: 141 MMOL/L (ref 136–145)
WBC # SPEC AUTO: 2.8 X10(3)/MCL (ref 4.5–11)

## 2019-12-20 ENCOUNTER — HISTORICAL (OUTPATIENT)
Dept: HEMATOLOGY/ONCOLOGY | Facility: CLINIC | Age: 64
End: 2019-12-20

## 2019-12-20 LAB
ABS NEUT (OLG): 3.49 X10(3)/MCL (ref 2.1–9.2)
ALBUMIN SERPL-MCNC: 2.8 GM/DL (ref 3.4–5)
ALBUMIN/GLOB SERPL: 0.7 RATIO (ref 1.1–2)
ALP SERPL-CCNC: 93 UNIT/L (ref 45–117)
ALT SERPL-CCNC: 21 UNIT/L (ref 12–78)
AST SERPL-CCNC: 30 UNIT/L (ref 15–37)
BASOPHILS # BLD AUTO: 0.1 X10(3)/MCL (ref 0–0.2)
BASOPHILS NFR BLD AUTO: 1 %
BILIRUB SERPL-MCNC: 0.4 MG/DL (ref 0.2–1)
BILIRUBIN DIRECT+TOT PNL SERPL-MCNC: 0.1 MG/DL (ref 0–0.2)
BILIRUBIN DIRECT+TOT PNL SERPL-MCNC: 0.3 MG/DL
BUN SERPL-MCNC: 5 MG/DL (ref 7–18)
CALCIUM SERPL-MCNC: 10.9 MG/DL (ref 8.5–10.1)
CHLORIDE SERPL-SCNC: 108 MMOL/L (ref 98–107)
CO2 SERPL-SCNC: 28 MMOL/L (ref 21–32)
CREAT SERPL-MCNC: 0.7 MG/DL (ref 0.6–1.3)
EOSINOPHIL # BLD AUTO: 0 X10(3)/MCL (ref 0–0.9)
EOSINOPHIL NFR BLD AUTO: 0 %
ERYTHROCYTE [DISTWIDTH] IN BLOOD BY AUTOMATED COUNT: 13.7 % (ref 11.5–14.5)
GLOBULIN SER-MCNC: 3.9 GM/ML (ref 2.3–3.5)
GLUCOSE SERPL-MCNC: 127 MG/DL (ref 74–106)
HCT VFR BLD AUTO: 38.6 % (ref 35–46)
HGB BLD-MCNC: 12.7 GM/DL (ref 12–16)
IMM GRANULOCYTES # BLD AUTO: 0.1 10*3/UL
IMM GRANULOCYTES NFR BLD AUTO: 2 %
LYMPHOCYTES # BLD AUTO: 1.6 X10(3)/MCL (ref 0.6–4.6)
LYMPHOCYTES NFR BLD AUTO: 26 %
MCH RBC QN AUTO: 37.6 PG (ref 26–34)
MCHC RBC AUTO-ENTMCNC: 32.9 GM/DL (ref 31–37)
MCV RBC AUTO: 114.2 FL (ref 80–100)
MONOCYTES # BLD AUTO: 1 X10(3)/MCL (ref 0.1–1.3)
MONOCYTES NFR BLD AUTO: 16 %
NEUTROPHILS # BLD AUTO: 3.49 X10(3)/MCL (ref 2.1–9.2)
NEUTROPHILS NFR BLD AUTO: 55 %
PLATELET # BLD AUTO: 356 X10(3)/MCL (ref 130–400)
PMV BLD AUTO: 9.2 FL (ref 7.4–10.4)
POTASSIUM SERPL-SCNC: 3.8 MMOL/L (ref 3.5–5.1)
PROT SERPL-MCNC: 6.7 GM/DL (ref 6.4–8.2)
RBC # BLD AUTO: 3.38 X10(6)/MCL (ref 4–5.2)
SODIUM SERPL-SCNC: 141 MMOL/L (ref 136–145)
WBC # SPEC AUTO: 6.3 X10(3)/MCL (ref 4.5–11)

## 2020-01-03 ENCOUNTER — HISTORICAL (OUTPATIENT)
Dept: INFUSION THERAPY | Facility: HOSPITAL | Age: 65
End: 2020-01-03

## 2020-01-09 ENCOUNTER — HISTORICAL (OUTPATIENT)
Dept: INFUSION THERAPY | Facility: HOSPITAL | Age: 65
End: 2020-01-09

## 2020-01-09 LAB
ABS NEUT (OLG): 1.66 X10(3)/MCL (ref 2.1–9.2)
ALBUMIN SERPL-MCNC: 2.9 GM/DL (ref 3.4–5)
ALBUMIN/GLOB SERPL: 0.7 RATIO (ref 1.1–2)
ALP SERPL-CCNC: 96 UNIT/L (ref 45–117)
ALT SERPL-CCNC: 28 UNIT/L (ref 12–78)
ANISOCYTOSIS BLD QL SMEAR: ABNORMAL
AST SERPL-CCNC: 28 UNIT/L (ref 15–37)
BASOPHILS NFR BLD MANUAL: 1 %
BILIRUB SERPL-MCNC: 0.4 MG/DL (ref 0.2–1)
BILIRUBIN DIRECT+TOT PNL SERPL-MCNC: 0.1 MG/DL (ref 0–0.2)
BILIRUBIN DIRECT+TOT PNL SERPL-MCNC: 0.3 MG/DL
BUN SERPL-MCNC: 6 MG/DL (ref 7–18)
CALCIUM SERPL-MCNC: 11 MG/DL (ref 8.5–10.1)
CHLORIDE SERPL-SCNC: 106 MMOL/L (ref 98–107)
CO2 SERPL-SCNC: 25 MMOL/L (ref 21–32)
CREAT SERPL-MCNC: 0.9 MG/DL (ref 0.6–1.3)
DACRYOCYTES BLD QL SMEAR: ABNORMAL
EOSINOPHIL NFR BLD MANUAL: 3 %
ERYTHROCYTE [DISTWIDTH] IN BLOOD BY AUTOMATED COUNT: 13.9 % (ref 11.5–14.5)
GLOBULIN SER-MCNC: 4.2 GM/ML (ref 2.3–3.5)
GLUCOSE SERPL-MCNC: 136 MG/DL (ref 74–106)
GRANULOCYTES NFR BLD MANUAL: 47 % (ref 43–75)
HCT VFR BLD AUTO: 37.1 % (ref 35–46)
HGB BLD-MCNC: 12.2 GM/DL (ref 12–16)
LYMPHOCYTES NFR BLD MANUAL: 34 % (ref 20.5–51.1)
LYMPHOCYTES NFR BLD MANUAL: 8 %
MACROCYTES BLD QL SMEAR: ABNORMAL
MCH RBC QN AUTO: 36.2 PG (ref 26–34)
MCHC RBC AUTO-ENTMCNC: 32.9 GM/DL (ref 31–37)
MCV RBC AUTO: 110.1 FL (ref 80–100)
MONOCYTES NFR BLD MANUAL: 6 % (ref 2–9)
NEUTS BAND NFR BLD MANUAL: 1 % (ref 0–10)
PLATELET # BLD AUTO: 137 X10(3)/MCL (ref 130–400)
PLATELET # BLD EST: ADEQUATE 10*3/UL
PMV BLD AUTO: 10.1 FL (ref 7.4–10.4)
POIKILOCYTOSIS BLD QL SMEAR: ABNORMAL
POLYCHROMASIA BLD QL SMEAR: ABNORMAL
POTASSIUM SERPL-SCNC: 4 MMOL/L (ref 3.5–5.1)
PROT SERPL-MCNC: 7.1 GM/DL (ref 6.4–8.2)
RBC # BLD AUTO: 3.37 X10(6)/MCL (ref 4–5.2)
RBC MORPH BLD: ABNORMAL
SODIUM SERPL-SCNC: 138 MMOL/L (ref 136–145)
WBC # SPEC AUTO: 3.6 X10(3)/MCL (ref 4.5–11)

## 2020-01-10 ENCOUNTER — HISTORICAL (OUTPATIENT)
Dept: INFUSION THERAPY | Facility: HOSPITAL | Age: 65
End: 2020-01-10

## 2020-01-10 LAB
ALBUMIN SERPL-MCNC: 2.8 GM/DL (ref 3.4–5)
ALBUMIN/GLOB SERPL: 0.7 RATIO (ref 1.1–2)
ALP SERPL-CCNC: 88 UNIT/L (ref 45–117)
ALT SERPL-CCNC: 24 UNIT/L (ref 12–78)
AST SERPL-CCNC: 23 UNIT/L (ref 15–37)
BILIRUB SERPL-MCNC: 0.7 MG/DL (ref 0.2–1)
BILIRUBIN DIRECT+TOT PNL SERPL-MCNC: 0.2 MG/DL (ref 0–0.2)
BILIRUBIN DIRECT+TOT PNL SERPL-MCNC: 0.5 MG/DL
BUN SERPL-MCNC: 5 MG/DL (ref 7–18)
CALCIUM SERPL-MCNC: 11 MG/DL (ref 8.5–10.1)
CHLORIDE SERPL-SCNC: 110 MMOL/L (ref 98–107)
CO2 SERPL-SCNC: 25 MMOL/L (ref 21–32)
CREAT SERPL-MCNC: 0.8 MG/DL (ref 0.6–1.3)
GLOBULIN SER-MCNC: 4.2 GM/ML (ref 2.3–3.5)
GLUCOSE SERPL-MCNC: 130 MG/DL (ref 74–106)
POTASSIUM SERPL-SCNC: 3.5 MMOL/L (ref 3.5–5.1)
PROT SERPL-MCNC: 7 GM/DL (ref 6.4–8.2)
SODIUM SERPL-SCNC: 137 MMOL/L (ref 136–145)

## 2020-01-28 ENCOUNTER — HISTORICAL (OUTPATIENT)
Dept: ADMINISTRATIVE | Facility: HOSPITAL | Age: 65
End: 2020-01-28

## 2020-01-28 LAB
ABS NEUT (OLG): 3.36 X10(3)/MCL (ref 2.1–9.2)
ALBUMIN SERPL-MCNC: 2.9 GM/DL (ref 3.4–5)
ALBUMIN/GLOB SERPL: 0.8 RATIO (ref 1.1–2)
ALP SERPL-CCNC: 80 UNIT/L (ref 45–117)
ALT SERPL-CCNC: 20 UNIT/L (ref 12–78)
AST SERPL-CCNC: 26 UNIT/L (ref 15–37)
BASOPHILS # BLD AUTO: 0.1 X10(3)/MCL (ref 0–0.2)
BASOPHILS NFR BLD AUTO: 2 %
BILIRUB SERPL-MCNC: 0.4 MG/DL (ref 0.2–1)
BILIRUBIN DIRECT+TOT PNL SERPL-MCNC: 0.1 MG/DL (ref 0–0.2)
BILIRUBIN DIRECT+TOT PNL SERPL-MCNC: 0.3 MG/DL
BUN SERPL-MCNC: 6 MG/DL (ref 7–18)
CALCIUM SERPL-MCNC: 10.6 MG/DL (ref 8.5–10.1)
CHLORIDE SERPL-SCNC: 109 MMOL/L (ref 98–107)
CO2 SERPL-SCNC: 27 MMOL/L (ref 21–32)
CREAT SERPL-MCNC: 0.6 MG/DL (ref 0.6–1.3)
EOSINOPHIL # BLD AUTO: 0.1 X10(3)/MCL (ref 0–0.9)
EOSINOPHIL NFR BLD AUTO: 1 %
ERYTHROCYTE [DISTWIDTH] IN BLOOD BY AUTOMATED COUNT: 13.2 % (ref 11.5–14.5)
GLOBULIN SER-MCNC: 3.8 GM/ML (ref 2.3–3.5)
GLUCOSE SERPL-MCNC: 100 MG/DL (ref 74–106)
HCT VFR BLD AUTO: 37.9 % (ref 35–46)
HGB BLD-MCNC: 12.4 GM/DL (ref 12–16)
IMM GRANULOCYTES # BLD AUTO: 0.05 10*3/UL
IMM GRANULOCYTES NFR BLD AUTO: 1 %
LYMPHOCYTES # BLD AUTO: 2.2 X10(3)/MCL (ref 0.6–4.6)
LYMPHOCYTES NFR BLD AUTO: 33 %
MCH RBC QN AUTO: 34.9 PG (ref 26–34)
MCHC RBC AUTO-ENTMCNC: 32.7 GM/DL (ref 31–37)
MCV RBC AUTO: 106.8 FL (ref 80–100)
MONOCYTES # BLD AUTO: 0.7 X10(3)/MCL (ref 0.1–1.3)
MONOCYTES NFR BLD AUTO: 11 %
NEUTROPHILS # BLD AUTO: 3.36 X10(3)/MCL (ref 2.1–9.2)
NEUTROPHILS NFR BLD AUTO: 52 %
PLATELET # BLD AUTO: 230 X10(3)/MCL (ref 130–400)
PMV BLD AUTO: 10.1 FL (ref 7.4–10.4)
POTASSIUM SERPL-SCNC: 3 MMOL/L (ref 3.5–5.1)
PROT SERPL-MCNC: 6.7 GM/DL (ref 6.4–8.2)
RBC # BLD AUTO: 3.55 X10(6)/MCL (ref 4–5.2)
SODIUM SERPL-SCNC: 141 MMOL/L (ref 136–145)
WBC # SPEC AUTO: 6.5 X10(3)/MCL (ref 4.5–11)

## 2020-01-29 ENCOUNTER — HISTORICAL (OUTPATIENT)
Dept: INFUSION THERAPY | Facility: HOSPITAL | Age: 65
End: 2020-01-29

## 2020-01-30 ENCOUNTER — HISTORICAL (OUTPATIENT)
Dept: INFUSION THERAPY | Facility: HOSPITAL | Age: 65
End: 2020-01-30

## 2020-01-30 LAB
ABS NEUT (OLG): 5.31 X10(3)/MCL (ref 2.1–9.2)
ALBUMIN SERPL-MCNC: 2.9 GM/DL (ref 3.4–5)
ALBUMIN/GLOB SERPL: 0.7 RATIO (ref 1.1–2)
ALP SERPL-CCNC: 81 UNIT/L (ref 45–117)
ALT SERPL-CCNC: 21 UNIT/L (ref 12–78)
AST SERPL-CCNC: 29 UNIT/L (ref 15–37)
BASOPHILS # BLD AUTO: 0.1 X10(3)/MCL (ref 0–0.2)
BASOPHILS NFR BLD AUTO: 1 %
BILIRUB SERPL-MCNC: 0.5 MG/DL (ref 0.2–1)
BILIRUBIN DIRECT+TOT PNL SERPL-MCNC: 0.2 MG/DL (ref 0–0.2)
BILIRUBIN DIRECT+TOT PNL SERPL-MCNC: 0.3 MG/DL
BUN SERPL-MCNC: 4 MG/DL (ref 7–18)
CALCIUM SERPL-MCNC: 10.4 MG/DL (ref 8.5–10.1)
CHLORIDE SERPL-SCNC: 107 MMOL/L (ref 98–107)
CO2 SERPL-SCNC: 27 MMOL/L (ref 21–32)
CREAT SERPL-MCNC: 0.6 MG/DL (ref 0.6–1.3)
EOSINOPHIL # BLD AUTO: 0 X10(3)/MCL (ref 0–0.9)
EOSINOPHIL NFR BLD AUTO: 0 %
ERYTHROCYTE [DISTWIDTH] IN BLOOD BY AUTOMATED COUNT: 13.1 % (ref 11.5–14.5)
GLOBULIN SER-MCNC: 4.2 GM/ML (ref 2.3–3.5)
GLUCOSE SERPL-MCNC: 117 MG/DL (ref 74–106)
HCT VFR BLD AUTO: 38.2 % (ref 35–46)
HGB BLD-MCNC: 12.7 GM/DL (ref 12–16)
IMM GRANULOCYTES # BLD AUTO: 0.06 10*3/UL
IMM GRANULOCYTES NFR BLD AUTO: 1 %
LYMPHOCYTES # BLD AUTO: 2.9 X10(3)/MCL (ref 0.6–4.6)
LYMPHOCYTES NFR BLD AUTO: 31 %
MAGNESIUM SERPL-MCNC: 1.7 MG/DL (ref 1.6–2.6)
MCH RBC QN AUTO: 34.7 PG (ref 26–34)
MCHC RBC AUTO-ENTMCNC: 33.2 GM/DL (ref 31–37)
MCV RBC AUTO: 104.4 FL (ref 80–100)
MONOCYTES # BLD AUTO: 0.9 X10(3)/MCL (ref 0.1–1.3)
MONOCYTES NFR BLD AUTO: 10 %
NEUTROPHILS # BLD AUTO: 5.31 X10(3)/MCL (ref 2.1–9.2)
NEUTROPHILS NFR BLD AUTO: 57 %
PLATELET # BLD AUTO: 280 X10(3)/MCL (ref 130–400)
PMV BLD AUTO: 9.3 FL (ref 7.4–10.4)
POTASSIUM SERPL-SCNC: 2.9 MMOL/L (ref 3.5–5.1)
PROT SERPL-MCNC: 7.1 GM/DL (ref 6.4–8.2)
RBC # BLD AUTO: 3.66 X10(6)/MCL (ref 4–5.2)
SODIUM SERPL-SCNC: 138 MMOL/L (ref 136–145)
WBC # SPEC AUTO: 9.3 X10(3)/MCL (ref 4.5–11)

## 2020-02-26 ENCOUNTER — HISTORICAL (OUTPATIENT)
Dept: ADMINISTRATIVE | Facility: HOSPITAL | Age: 65
End: 2020-02-26

## 2020-02-26 LAB
ABS NEUT (OLG): 4.77 X10(3)/MCL (ref 2.1–9.2)
ALBUMIN SERPL-MCNC: 2.8 GM/DL (ref 3.4–5)
ALBUMIN/GLOB SERPL: 0.6 RATIO (ref 1.1–2)
ALP SERPL-CCNC: 102 UNIT/L (ref 45–117)
ALT SERPL-CCNC: 21 UNIT/L (ref 12–78)
AST SERPL-CCNC: 32 UNIT/L (ref 15–37)
BASOPHILS # BLD AUTO: 0 X10(3)/MCL (ref 0–0.2)
BASOPHILS NFR BLD AUTO: 1 %
BILIRUB SERPL-MCNC: 0.6 MG/DL (ref 0.2–1)
BILIRUBIN DIRECT+TOT PNL SERPL-MCNC: 0.2 MG/DL (ref 0–0.2)
BILIRUBIN DIRECT+TOT PNL SERPL-MCNC: 0.4 MG/DL
BUN SERPL-MCNC: 3 MG/DL (ref 7–18)
CALCIUM SERPL-MCNC: 11.1 MG/DL (ref 8.5–10.1)
CHLORIDE SERPL-SCNC: 108 MMOL/L (ref 98–107)
CO2 SERPL-SCNC: 25 MMOL/L (ref 21–32)
CREAT SERPL-MCNC: 0.8 MG/DL (ref 0.6–1.3)
EOSINOPHIL # BLD AUTO: 0.1 X10(3)/MCL (ref 0–0.9)
EOSINOPHIL NFR BLD AUTO: 1 %
ERYTHROCYTE [DISTWIDTH] IN BLOOD BY AUTOMATED COUNT: 13.2 % (ref 11.5–14.5)
GLOBULIN SER-MCNC: 4.5 GM/ML (ref 2.3–3.5)
GLUCOSE SERPL-MCNC: 113 MG/DL (ref 74–106)
HCT VFR BLD AUTO: 38.1 % (ref 35–46)
HGB BLD-MCNC: 12.2 GM/DL (ref 12–16)
IMM GRANULOCYTES # BLD AUTO: 0.02 10*3/UL
IMM GRANULOCYTES NFR BLD AUTO: 0 %
LYMPHOCYTES # BLD AUTO: 2 X10(3)/MCL (ref 0.6–4.6)
LYMPHOCYTES NFR BLD AUTO: 26 %
MCH RBC QN AUTO: 32.2 PG (ref 26–34)
MCHC RBC AUTO-ENTMCNC: 32 GM/DL (ref 31–37)
MCV RBC AUTO: 100.5 FL (ref 80–100)
MONOCYTES # BLD AUTO: 0.8 X10(3)/MCL (ref 0.1–1.3)
MONOCYTES NFR BLD AUTO: 10 %
NEUTROPHILS # BLD AUTO: 4.77 X10(3)/MCL (ref 2.1–9.2)
NEUTROPHILS NFR BLD AUTO: 62 %
PLATELET # BLD AUTO: 275 X10(3)/MCL (ref 130–400)
PMV BLD AUTO: 9.6 FL (ref 7.4–10.4)
POTASSIUM SERPL-SCNC: 3.7 MMOL/L (ref 3.5–5.1)
PROT SERPL-MCNC: 7.3 GM/DL (ref 6.4–8.2)
PTH-INTACT SERPL-MCNC: 46.9 PG/ML (ref 18.4–80.1)
RBC # BLD AUTO: 3.79 X10(6)/MCL (ref 4–5.2)
SODIUM SERPL-SCNC: 136 MMOL/L (ref 136–145)
WBC # SPEC AUTO: 7.7 X10(3)/MCL (ref 4.5–11)

## 2020-03-20 ENCOUNTER — HISTORICAL (OUTPATIENT)
Dept: RADIOLOGY | Facility: HOSPITAL | Age: 65
End: 2020-03-20

## 2020-03-26 ENCOUNTER — HISTORICAL (OUTPATIENT)
Dept: ADMINISTRATIVE | Facility: HOSPITAL | Age: 65
End: 2020-03-26

## 2020-03-26 LAB
ABS NEUT (OLG): 4.1 X10(3)/MCL (ref 2.1–9.2)
ALBUMIN SERPL-MCNC: 2.9 GM/DL (ref 3.4–5)
ALBUMIN/GLOB SERPL: 0.6 RATIO (ref 1.1–2)
ALP SERPL-CCNC: 104 UNIT/L (ref 45–117)
ALT SERPL-CCNC: 22 UNIT/L (ref 12–78)
AST SERPL-CCNC: 36 UNIT/L (ref 15–37)
BASOPHILS # BLD AUTO: 0 X10(3)/MCL (ref 0–0.2)
BASOPHILS NFR BLD AUTO: 1 %
BILIRUB SERPL-MCNC: 0.3 MG/DL (ref 0.2–1)
BILIRUBIN DIRECT+TOT PNL SERPL-MCNC: 0.1 MG/DL (ref 0–0.2)
BILIRUBIN DIRECT+TOT PNL SERPL-MCNC: 0.2 MG/DL
BUN SERPL-MCNC: 7 MG/DL (ref 7–18)
CALCIUM SERPL-MCNC: 11 MG/DL (ref 8.5–10.1)
CHLORIDE SERPL-SCNC: 108 MMOL/L (ref 98–107)
CO2 SERPL-SCNC: 24 MMOL/L (ref 21–32)
CREAT SERPL-MCNC: 0.8 MG/DL (ref 0.6–1.3)
EOSINOPHIL # BLD AUTO: 0.1 X10(3)/MCL (ref 0–0.9)
EOSINOPHIL NFR BLD AUTO: 1 %
ERYTHROCYTE [DISTWIDTH] IN BLOOD BY AUTOMATED COUNT: 13.2 % (ref 11.5–14.5)
GLOBULIN SER-MCNC: 4.6 GM/ML (ref 2.3–3.5)
GLUCOSE SERPL-MCNC: 141 MG/DL (ref 74–106)
HCT VFR BLD AUTO: 40.1 % (ref 35–46)
HGB BLD-MCNC: 12.9 GM/DL (ref 12–16)
IMM GRANULOCYTES # BLD AUTO: 0.02 10*3/UL
IMM GRANULOCYTES NFR BLD AUTO: 0 %
LYMPHOCYTES # BLD AUTO: 2.8 X10(3)/MCL (ref 0.6–4.6)
LYMPHOCYTES NFR BLD AUTO: 36 %
MCH RBC QN AUTO: 30.9 PG (ref 26–34)
MCHC RBC AUTO-ENTMCNC: 32.2 GM/DL (ref 31–37)
MCV RBC AUTO: 96.2 FL (ref 80–100)
MONOCYTES # BLD AUTO: 0.9 X10(3)/MCL (ref 0.1–1.3)
MONOCYTES NFR BLD AUTO: 12 %
NEUTROPHILS # BLD AUTO: 4.1 X10(3)/MCL (ref 2.1–9.2)
NEUTROPHILS NFR BLD AUTO: 51 %
PLATELET # BLD AUTO: 255 X10(3)/MCL (ref 130–400)
PMV BLD AUTO: 9.5 FL (ref 7.4–10.4)
POTASSIUM SERPL-SCNC: 3.9 MMOL/L (ref 3.5–5.1)
PROT SERPL-MCNC: 7.5 GM/DL (ref 6.4–8.2)
RBC # BLD AUTO: 4.17 X10(6)/MCL (ref 4–5.2)
SODIUM SERPL-SCNC: 138 MMOL/L (ref 136–145)
WBC # SPEC AUTO: 8 X10(3)/MCL (ref 4.5–11)

## 2020-05-15 ENCOUNTER — HISTORICAL (OUTPATIENT)
Dept: RADIOLOGY | Facility: HOSPITAL | Age: 65
End: 2020-05-15

## 2020-05-15 LAB
ALBUMIN SERPL-MCNC: 3 GM/DL (ref 3.4–5)
ALBUMIN/GLOB SERPL: 0.6 RATIO (ref 1.1–2)
ALP SERPL-CCNC: 146 UNIT/L (ref 45–117)
ALT SERPL-CCNC: 30 UNIT/L (ref 12–78)
AST SERPL-CCNC: 33 UNIT/L (ref 15–37)
BILIRUB SERPL-MCNC: 0.3 MG/DL (ref 0.2–1)
BILIRUBIN DIRECT+TOT PNL SERPL-MCNC: >0.1 MG/DL (ref 0–0.2)
BILIRUBIN DIRECT+TOT PNL SERPL-MCNC: ABNORMAL MG/DL
BUN SERPL-MCNC: 15 MG/DL (ref 7–18)
CALCIUM SERPL-MCNC: 10.1 MG/DL (ref 8.5–10.1)
CHLORIDE SERPL-SCNC: 105 MMOL/L (ref 98–107)
CO2 SERPL-SCNC: 24 MMOL/L (ref 21–32)
CREAT SERPL-MCNC: 0.9 MG/DL (ref 0.6–1.3)
GLOBULIN SER-MCNC: 4.8 GM/ML (ref 2.3–3.5)
GLUCOSE SERPL-MCNC: 86 MG/DL (ref 74–106)
POTASSIUM SERPL-SCNC: 4.7 MMOL/L (ref 3.5–5.1)
PROT SERPL-MCNC: 7.8 GM/DL (ref 6.4–8.2)
SODIUM SERPL-SCNC: 135 MMOL/L (ref 136–145)

## 2020-05-26 ENCOUNTER — HISTORICAL (OUTPATIENT)
Dept: ADMINISTRATIVE | Facility: HOSPITAL | Age: 65
End: 2020-05-26

## 2020-05-26 LAB
ABS NEUT (OLG): 5.99 X10(3)/MCL (ref 2.1–9.2)
ALBUMIN SERPL-MCNC: 2.6 GM/DL (ref 3.4–5)
ALBUMIN/GLOB SERPL: 0.6 RATIO (ref 1.1–2)
ALP SERPL-CCNC: 148 UNIT/L (ref 45–117)
ALT SERPL-CCNC: 28 UNIT/L (ref 12–78)
AST SERPL-CCNC: 39 UNIT/L (ref 15–37)
BASOPHILS # BLD AUTO: 0 X10(3)/MCL (ref 0–0.2)
BASOPHILS NFR BLD AUTO: 0 %
BILIRUB SERPL-MCNC: 0.4 MG/DL (ref 0.2–1)
BILIRUBIN DIRECT+TOT PNL SERPL-MCNC: 0.1 MG/DL (ref 0–0.2)
BILIRUBIN DIRECT+TOT PNL SERPL-MCNC: 0.3 MG/DL
BUN SERPL-MCNC: 14 MG/DL (ref 7–18)
CALCIUM SERPL-MCNC: 9.5 MG/DL (ref 8.5–10.1)
CHLORIDE SERPL-SCNC: 106 MMOL/L (ref 98–107)
CO2 SERPL-SCNC: 25 MMOL/L (ref 21–32)
CREAT SERPL-MCNC: 0.7 MG/DL (ref 0.6–1.3)
EOSINOPHIL # BLD AUTO: 0 X10(3)/MCL (ref 0–0.9)
EOSINOPHIL NFR BLD AUTO: 0 %
ERYTHROCYTE [DISTWIDTH] IN BLOOD BY AUTOMATED COUNT: 15.4 % (ref 11.5–14.5)
GLOBULIN SER-MCNC: 4.2 GM/ML (ref 2.3–3.5)
GLUCOSE SERPL-MCNC: 97 MG/DL (ref 74–106)
HCT VFR BLD AUTO: 38.1 % (ref 35–46)
HGB BLD-MCNC: 12.2 GM/DL (ref 12–16)
IMM GRANULOCYTES # BLD AUTO: 0.1 10*3/UL
IMM GRANULOCYTES NFR BLD AUTO: 1 %
LYMPHOCYTES # BLD AUTO: 2.6 X10(3)/MCL (ref 0.6–4.6)
LYMPHOCYTES NFR BLD AUTO: 27 %
MCH RBC QN AUTO: 29.8 PG (ref 26–34)
MCHC RBC AUTO-ENTMCNC: 32 GM/DL (ref 31–37)
MCV RBC AUTO: 93.2 FL (ref 80–100)
MONOCYTES # BLD AUTO: 1 X10(3)/MCL (ref 0.1–1.3)
MONOCYTES NFR BLD AUTO: 10 %
NEUTROPHILS # BLD AUTO: 5.99 X10(3)/MCL (ref 2.1–9.2)
NEUTROPHILS NFR BLD AUTO: 61 %
PLATELET # BLD AUTO: 259 X10(3)/MCL (ref 130–400)
PMV BLD AUTO: 9.9 FL (ref 7.4–10.4)
POTASSIUM SERPL-SCNC: 4.1 MMOL/L (ref 3.5–5.1)
PROT SERPL-MCNC: 6.8 GM/DL (ref 6.4–8.2)
RBC # BLD AUTO: 4.09 X10(6)/MCL (ref 4–5.2)
SODIUM SERPL-SCNC: 136 MMOL/L (ref 136–145)
WBC # SPEC AUTO: 9.8 X10(3)/MCL (ref 4.5–11)

## 2020-06-26 ENCOUNTER — HISTORICAL (OUTPATIENT)
Dept: HEMATOLOGY/ONCOLOGY | Facility: CLINIC | Age: 65
End: 2020-06-26

## 2020-06-26 LAB
ABS NEUT (OLG): 9.17 X10(3)/MCL (ref 2.1–9.2)
ALBUMIN SERPL-MCNC: 2.6 GM/DL (ref 3.4–5)
ALBUMIN/GLOB SERPL: 0.6 RATIO (ref 1.1–2)
ALP SERPL-CCNC: 163 UNIT/L (ref 45–117)
ALT SERPL-CCNC: 26 UNIT/L (ref 12–78)
AST SERPL-CCNC: 48 UNIT/L (ref 15–37)
BASOPHILS # BLD AUTO: 0 X10(3)/MCL (ref 0–0.2)
BASOPHILS NFR BLD AUTO: 0 %
BILIRUB SERPL-MCNC: 0.4 MG/DL (ref 0.2–1)
BILIRUBIN DIRECT+TOT PNL SERPL-MCNC: 0.1 MG/DL (ref 0–0.2)
BILIRUBIN DIRECT+TOT PNL SERPL-MCNC: 0.3 MG/DL
BUN SERPL-MCNC: 13 MG/DL (ref 7–18)
CALCIUM SERPL-MCNC: 9.7 MG/DL (ref 8.5–10.1)
CHLORIDE SERPL-SCNC: 106 MMOL/L (ref 98–107)
CO2 SERPL-SCNC: 28 MMOL/L (ref 21–32)
CREAT SERPL-MCNC: 0.8 MG/DL (ref 0.6–1.3)
EOSINOPHIL # BLD AUTO: 0 X10(3)/MCL (ref 0–0.9)
EOSINOPHIL NFR BLD AUTO: 0 %
ERYTHROCYTE [DISTWIDTH] IN BLOOD BY AUTOMATED COUNT: 14.6 % (ref 11.5–14.5)
GLOBULIN SER-MCNC: 4.6 GM/ML (ref 2.3–3.5)
GLUCOSE SERPL-MCNC: 98 MG/DL (ref 74–106)
HCT VFR BLD AUTO: 35.4 % (ref 35–46)
HGB BLD-MCNC: 11.5 GM/DL (ref 12–16)
IMM GRANULOCYTES # BLD AUTO: 0.08 10*3/UL
IMM GRANULOCYTES NFR BLD AUTO: 1 %
LYMPHOCYTES # BLD AUTO: 3.1 X10(3)/MCL (ref 0.6–4.6)
LYMPHOCYTES NFR BLD AUTO: 23 %
MCH RBC QN AUTO: 29.7 PG (ref 26–34)
MCHC RBC AUTO-ENTMCNC: 32.5 GM/DL (ref 31–37)
MCV RBC AUTO: 91.5 FL (ref 80–100)
MONOCYTES # BLD AUTO: 1.4 X10(3)/MCL (ref 0.1–1.3)
MONOCYTES NFR BLD AUTO: 10 %
NEUTROPHILS # BLD AUTO: 9.17 X10(3)/MCL (ref 2.1–9.2)
NEUTROPHILS NFR BLD AUTO: 67 %
PLATELET # BLD AUTO: 306 X10(3)/MCL (ref 130–400)
PMV BLD AUTO: 10.2 FL (ref 7.4–10.4)
POTASSIUM SERPL-SCNC: 3.9 MMOL/L (ref 3.5–5.1)
PROT SERPL-MCNC: 7.2 GM/DL (ref 6.4–8.2)
RBC # BLD AUTO: 3.87 X10(6)/MCL (ref 4–5.2)
SODIUM SERPL-SCNC: 136 MMOL/L (ref 136–145)
WBC # SPEC AUTO: 13.8 X10(3)/MCL (ref 4.5–11)

## 2022-04-10 ENCOUNTER — HISTORICAL (OUTPATIENT)
Dept: ADMINISTRATIVE | Facility: HOSPITAL | Age: 67
End: 2022-04-10

## 2022-04-26 VITALS
HEIGHT: 63 IN | DIASTOLIC BLOOD PRESSURE: 69 MMHG | SYSTOLIC BLOOD PRESSURE: 102 MMHG | BODY MASS INDEX: 26.17 KG/M2 | WEIGHT: 147.69 LBS | OXYGEN SATURATION: 97 %

## 2022-04-30 NOTE — PROGRESS NOTES
Patient:   Elen Kidd            MRN: [id number]            FIN: [id number]               Age:   61 years     Sex:  Female     :  1955   Associated Diagnoses:   None   Author:   Arya Amos MD      Visit Information      Cancer Staging    Diagnosis:  No qualifying data available  Stage  No qualifying data available  Treatment Plan  ONC Faslodex Maintenance..    Scheduling (Discontinued)  2017    Faslodex (Months 1 to 3) (Initiated) 2017  ONC Faslodex - Loading dose..    Scheduling (Discontinued)  2017    Faslodex (Days 1, 15, 29) (Completed)  2017     Visit type:  [facility] Visit.       Interval History    61-year-old  female  has  a history of  left breast primary as documented in PMH.  .  She underwent lumpectomy followed by chemotherapy with Taxotere/Cytoxan beginning on 10/1/09 and ending in 2010 at [facility] [place] (patient reports 2 rounds of chemo), then Aromasin inhibitor for 1 month.  Patient did not continue this medication she says because of loss of insurance.    She presented to the ER on 4/15/17 with three-month history of left upper quadrant pain, nausea/vomiting. and progressive shortness of breath. Past medical history significant for heavy smoking.    CT imaging identified extensive metastatic disease with mediastinal, left internal mammary lymphadenopathy and extensive pleural metastatic deposits with large right and moderate left pleural effusions.  Also noted were numerous pulmonary nodules, innumerable hepatic metastases, multiple intra-abdominal and retroperitoneal implants, and extensive osseous metastasis.    Patient underwent a right thoracentesis on 17, which showed cytomorphology of adenocarcinoma, consistent with a breast primary.       Started Faslodex on 2014, with dramatic turnaround in her general health subsequently.  Started concurrent implants on 2017.    Presents for follow-up visit.  This is my first  encounter with her.  With Faslodex, her general health has made a dramatic turnaround.  From being bedbound until 2 or 3 months back, she is now up and about, and bubbling with energy.  Enjoying life.  Enjoying her family's company as well as reunion.  She mows her own glass in her backyard.  Appetite is great.  The only symptom she has is pain in hips and lower back, 4 out on a scale of 1-10, if 10 could be the worst.  Takes tramadol only sparingly.  For starting treatment, she had lost 30 pounds in last 6 months, but says that she has regained 15 pounds back.  Denies neurological symptoms chest pain shortness of breath abdominal pain nausea vomiting etc.      PMH: 1. Left breast cancer upper outer quadrant                    Surgery: left lumpectomy 8/12/2009                    Pathology: invasive ductal ca                                         1.2 cm                                     grade ii                                DCIS                                 Margin : Positive                                Lymphovascular invasion                             ER 90%, NE 15%, Her 2 +1 by IHC                                   ki 67- 25%                          reexcision negative margin.                    Surgery: left mastectomy  9/8/ 2009                    Pathology:  invasive ductal carcinoma                                       largest tumor nodule 1.1, smaller nodule 0.5 cm                                        DCIS + high grade                                       lymphatic invasion: NO                                      perineural invasion: NO                                       Margin : Positive  to ink margin of largest                                       0 of 21 nodes                                       ER +/NE +,   HER2 1+ by IHC                    Stage: unknown                    Treatment:  Taxotere/Cytoxan beginning on 10/1/09 and ending in January 2010 at [facility] [place]                              (patient reports 2 rounds of chemo)                                      Aromasin inhibitor for 1 month.  Patient did not continue this medication she says                                      because of loss of insurance.                 2.  Recurrence:  CT chest/abd/pelvis 4/15/2017 -subcarinal node 2.1 x 3.0 cm                                   Right hilar lymph node 1.6 x 1.5 cm                                 Extensive left internal mammary lymphadenopathy  3.6 x 4.4 cm                             enlarged internal mammary and left superior mediastinal                                        lymph nodes as well.                             Right anterior mediastinal lymph nodes 1.0 x 1.5 cm                              Right para-aortic lymph node  1.2 x 2.2 cm .                                Multiple enlarged paracardiac/cardiophrenic lymph nodes 1.7 x 2.0 cm                              Large right and moderate left pleural effusions.                        Multifocal right pleural soft tissue nodularity 2.4 x 1.7 cm                      Numerous pulmonary nodules  8 mm RUL                                  10 mm in the left lower                                      7 mm in the left upper                      Extensive osseous metastatic disease with                          involvement of the thoracic spine, sternum, and suspected involvement                                of several ribs.                         Innumerable hepatic metastatic lesions 4.0 x 3.6 cm and 3.1x 2.6 cm                        Multiple small retroperitoneal nodes.                          nodule right lower quadrant measuring 1.2 x 0.8 cm                      Metastatic deposit in the retroperitoneum lateral to the right kidney 2.1 x 2.7 cm                   Treatment: Faslodex initiated 6/2017                                    Xeloda only taken for 2 weeks 6/2017               3. [id number]--5,999--5/16/2017         Review of  Systems   All systems reviewed, and found to be negative except for what is detailed above.      Health Status   Allergies:    Nonallergic Reactions (Selected)  Severity Not Documented  HYDROcodone- Altered mental state.,    Allergies (1) Active Reaction  HYDROcodone altered mental state     Current medications:  (Selected)   Outpatient Medications  Future  Faslodex IM Injection ([facility])/[facility]: 500 mg, form: Soln, IM, Once-NOW, *Est. first dose 09/08/17 8:00:00 CDT, *Est. stop date 09/08/17 8:00:00 CDT, Waste Sort Code: CHEMO, Months 2, Future Order, [id number]  Faslodex IM Injection ([facility])/[facility]: 500 mg, form: Soln, IM, Once-NOW, *Est. first dose 10/08/17 8:00:00 CDT, *Est. stop date 10/08/17 8:00:00 CDT, Waste Sort Code: CHEMO, Months 3, Future Order, [id number]  Prescriptions  Prescribed  Ibrance 125 mg oral capsule: 125 mg = 1 cap(s), Oral, Daily, daily for 21 days, then off for 7 days, # 21 cap(s), 4 Refill(s)  Percocet 5/325 oral tablet: 1 tab(s), Oral, q6hr, PRN PRN for pain, # 120 tab(s), 0 Refill(s)  Xanax 0.5 mg oral tablet: 0.5 mg = 1 tab(s), Oral, TID, # 90 tab(s), 0 Refill(s)  docusate-senna 50 mg-8.6 mg oral tablet: 2 tab(s), Oral, Once a day (at bedtime), X 30 day(s), # 60 tab(s), 5 Refill(s), Pharmacy: [facility]  Documented Medications  Documented  Seroquel 50 mg oral tablet: 50 mg = 1 tab(s), Oral, At HS, 0 Refill(s)  amlodipine 5 mg oral tablet: 5 mg = 1 tab(s), Oral, Daily, # 30 tab(s), 0 Refill(s)  dexamethasone 1 mg oral tablet: 1 tab, Oral, Daily, 0 Refill(s)  haloperidol 1 mg oral tablet: 1 mg = 1 tab(s), Oral, q4hr, PRN PRN nausea/vomiting, 0 Refill(s)  traMADol 50 mg oral tablet: 50 mg = 1 tab(s), Oral, q6hr, PRN PRN as needed for pain, 0 Refill(s)   Problem list:    All Problems  Breast ca / SNOMED CT [id number] / Confirmed  Cancer, metastatic / SNOMED CT [id number] / Confirmed  Hypertension / SNOMED CT 00739870 / Confirmed  Liver cancer,  primary, with metastasis from liver to other site / SNOMED CT [id number] / Confirmed  Hepatic neoplasm secondary / SNOMED CT 203269755 / Confirmed  Terminal Illness / SNOMED CT 812005123 / Confirmed  Tobacco user / SNOMED CT 932199978 / Probable  Tobacco user / SNOMED CT 883892808 / Probable,    Active Problems (8)  Breast ca   Cancer, metastatic   Hepatic neoplasm secondary   Hypertension   Liver cancer, primary, with metastasis from liver to other site   Terminal Illness   Tobacco user   Tobacco user         Histories   Past Medical History:    Active  Cancer, metastatic ([id number])   Family History:    Mesothelioma  Father  Primary malignant neoplasm of breast  Other  Diabetes mellitus type 2  Mother  CAD (coronary artery disease)  Mother  High blood pressure                                                                                                                                                                                                                     09-AUG-2016 15:25:48<$>  Mother     Procedure history:    Mastectomy ([id number]) in the month of 2010 at 54 Years.  Comments:  7/3/2017 14:10 - Sherrell Hicks LPN  right  Mastectomy ([id number]).  Comments:  2017 08:02 - Paulina Smith LPN  left -, right removed , then reconstruction  .  Comments:  2017 08:03 - Paulina Smith LPN  10-  back surgery.  Comments:  2017 08:04 - Paulina Smith LPN  1979  Appendectomy; ([id number]).   Social History        Social & Psychosocial Habits    Alcohol  04/15/2017  Use: Never    Substance Abuse  04/15/2017  Use: Never    Tobacco  2017  Use: Current every day smoker    Tobacco use per day: 10    Comment: started smokin cessation program thru [employer], medicaid ins. program - 2017 14:12 - Sherrell Hicks LPN  .        Physical Examination   General:  Alert and oriented, No acute distress, Thin but moves  easily on and off exam table with no assistance..    Eye:  Extraocular movements are intact, Normal conjunctiva, wears glasses.    HENT:  Normocephalic, Normal hearing, Oral mucosa is moist, No pharyngeal erythema.    Neck:  Supple, Non-tender, No lymphadenopathy, No thyromegaly.    Respiratory:  Lungs are clear to auscultation, Respirations are non-labored, No chest wall tenderness,  very mild  crackles with decreased breath sounds lower half of both lung fields, Mobile lymph nodes, about 2 cm in size, palpable in the right axilla..    Cardiovascular:  Normal rate, Regular rhythm, No murmur, No gallop, Good pulses equal in all extremities, Normal peripheral perfusion, no LE edema. Mild lymphedema of left arm.    Gastrointestinal:  Soft, Non-tender, Non-distended, Normal bowel sounds, No organomegaly.    Vital Signs   9/7/2017 10:21 CDT       Temperature Oral          36.9 DegC                             Peripheral Pulse Rate     97 bpm                             Respiratory Rate          18 br/min                             SpO2                      97 %                             Systolic Blood Pressure   148 mmHg  HI                             Diastolic Blood Pressure  82 mmHg     Measurements from flowsheet : Measurements   9/7/2017 10:21 CDT       Weight Dosing             63 kg                             Weight Measured           63 kg                             Weight Measured and Calculated in Lbs     138.89 lb                             Height/Length Dosing      160 cm                             Height/Length Measured    160 cm                             BSA Measured              1.67 m2                             Body Mass Index Measured  24.61 kg/m2        Vital Signs (last 24 hrs)_____  Last Charted___________  Temp Oral     36.9 DegC  (SEP 07 10:21)  Heart Rate Peripheral   97 bpm  (SEP 07 10:21)  Resp Rate         18 br/min  (SEP 07 10:21)  SBP      H 148mmHg  (SEP 07 10:21)  DBP      82  mmHg  (SEP 07 10:21)  SpO2      97 %  (SEP 07 10:21)  Weight      63 kg  (SEP 07 10:21)  Height      160 cm  (SEP 07 10:21)  BMI      24.61  (SEP 07 10:21)     Musculoskeletal:  Normal range of motion, Normal strength, No tenderness, No deformity.    Integumentary:  Warm, Intact.    Neurologic:  Alert, Oriented, Normal sensory, Normal motor function, No focal deficits, Cranial Nerves II-XII are grossly intact.    Cognition and Speech:  Oriented, Speech clear and coherent.    Psychiatric:  Cooperative, Appropriate mood & affect.    Breast:  bilateral breast reconstruction .    ECOG Performance Scale: 3 - Capable of only limited self-care; confined to bed or chair greater than 50 percent of waking hours.      Impression and Plan           Impression:  Metastatic Adenocarcinoma Breast with malignant effusions, mets to liver, bones and peritoneaum.  From extremely poor performance status, bedbound, extremely dramatic recovery with Faslodex.    .    Plan:  Continue Faslodex monthly.  Start him brands 125 mg p.o. daily, and monitor counts for neutropenia.  Check CBC, CMP, and tumor markers including CA 27.29 and CA 15-3 today.  Follow tumor markers monthly.  At this time, restage with contrast enhanced CT scans of chest abdomen and pelvis, and whole body bone scan.  Needs denosumab or bisphosphonate to prevent skeletal events from bone metastasis.  Says that she has bad teeth.  Advised her to consult with a dentist.  Will need clearance in terms of mental health before proceeding with bone directed therapy in order to prevent the likelihood of ONJ.    Follow-up visit in 2 weeks, with results.  Ms. Kidd was extremely appreciative.

## 2022-04-30 NOTE — PROGRESS NOTES
Patient:   Elen Kidd            MRN: [id number]            FIN: [id number]               Age:   62 years     Sex:  Female     :  1955   Associated Diagnoses:   None   Author:   Marlene Sears NP      Chief Complaint   2017 8:42 CST      breast cancer        Visit Information    1.  Recurrent metastatic breast cancer   4/15/2017     2. Left breast cancer upper outer quadrant, diagnosed 2009                    Treatment:  Taxotere/Cytoxan beginning on 10/1/09 and ending in 2010 at [facility] [place]                             (patient reports 2 rounds of chemo)                                      Aromasin inhibitor for 1 month.  Patient did not continue this medication she says                                      because of loss of insurance.      Current Treatment  Faslodex monthly started on 2017    Ibrance 125 mg p.o. daily with food for 21 days followed by 7 days off, Started 2017  Xgeva subcu monthly, to start 11/3/2017    Treatment History   xeloda stopped on 17 painful mucositis  Right thoracentesis on 17, which showed cytomorphology of adenocarcinoma, consistent with a breast primary.    Clinical History  1. Left breast cancer upper outer quadrant ER 90%, CA 15%, Her 2 +1 by IHC                                   ki 67- 25%                      Surgery: left lumpectomy 2009                    Pathology: 1.2 cm invasive ductal ca, grade 2              Lymphovascular invasion, positive margin                                                Surgery: left mastectomy  2009                    Pathology:  invasive ductal carcinoma                                       largest tumor nodule 1.1, smaller nodule 0.5 cm                                        DCIS + high grade                                       lymphatic invasion: NO                                       perineural invasion: NO                                       Margin : Positive to ink  margin of largest                                       0 of 21 nodes                                       ER +/VA +,   HER2 1+ by IHC                    Stage: unknown                    Treatment:  Taxotere/Cytoxan beginning on 10/1/09 and ending in [place] [facility] [place]  (patient reports 2 rounds of chemo)                                      Aromasin inhibitor for 1 month.  Patient did not continue this medication she says because of loss of insurance.     She presented to the ER on 4/15/17 with three-month history of left upper quadrant pain, nausea/vomiting and progressive shortness of breath. Past medical history significant for heavy smoking.    CT imaging identified extensive metastatic disease. She underwent a right thoracentesis on 5/2/17, which showed cytomorphology of adenocarcinoma, consistent with a breast primary.    Recurrence:  CT chest/abd/pelvis 4/15/2017 -subcarinal node 2.1 x 3.0 cm, Right hilar lymph node 1.6 x 1.5 cm. Extensive left internal mammary lymphadenopathy  3.6 x 4.4 cm     enlarged internal mammary and left superior mediastinal lymph nodes as well. Right anterior mediastinal lymph nodes 1.0 x 1.5 cm.  Right para-aortic lymph node  1.2 x 2.2 cm                    Multiple enlarged paracardiac/cardiophrenic lymph nodes 1.7 x 2.0 cm. Large right and moderate left pleural effusions.  Multifocal right pleural soft tissue nodularity 2.4 x 1.7 cm    Numerous pulmonary nodules. Extensive osseous metastatic disease with involvement of the thoracic spine, sternum, and suspected involvement  of several ribs.     Innumerable hepatic metastatic lesions 4.0 x 3.6 cm and 3.1x 2.6 cm. Multiple small retroperitoneal nodes. Metastatic deposit in the retroperitoneum lateral to the right kidney 2.1   x 2.7 cm         Xeloda stopped on 6-8-17 2/2 painful mucositis    Started Faslodex on 06/07/2014, with dramatic turn around in her general health.    9/07/2017 Started concurrent Ibrance 125mg po daily      With Faslodex, her general health has made a dramatic turnaround.  From being bedbound until 2 or 3 months back, she became up and about, and bubbling with energy.  Enjoying life.  Enjoying her family's company as well as reunion.  She mows her own glass in her backyard.  Appetite is great.  The only symptom she has is pain in hips and lower back, 4 out on a scale of 1-10, if 10 could be the worst.  Takes tramadol only sparingly.  Before starting treatment, she had lost 30 pounds in last 6 months, but says that she has regained 15 pounds back.  Denies neurological symptoms chest pain shortness of breath abdominal pain nausea vomiting etc.    Presents for a follow-up visit.  Extremely well.  Bubbling with energy.  Excellent appetite.  No significant aches or pains.   9/07/2017, CA 27.29 level was greater than 450.  It was 4999 on 05/16/2017.  CEA level 10.2.  CBC unremarkable.  CA 15-3 level elevated to 1836.  Whole-body bone scan on 09/15/2017 showed diffuse hypermetabolic activity throughout the spine, bilateral ribs, pelvis, and proximal femora, possibly with some increased activity in the calvarium as well, all findings consistent with widespread skeletal metastasis.  Follow-up CT scans of abdomen pelvis on 09/15/2017 showed a few scattered subcentimeter lung nodules in the right lung which were not seen previously but could have been masked secondary to large pleural effusion on previous exam; a small right pleural effusion; left lung nodules are not seen on the current exam; left pleural effusion has increased; widespread liver metastasis appears smaller; widespread skeletal metastases were again noted, worse at T6 and T7; stable mesenteric nodules.          Interval History   Patient presents today for follow up. Currently on cycle #3 Day 15 of Ibrance. Mildly neutropenic with ANC of 1200, however adequate to continue. She is having no intolerable side effects or toxicities. She is doing very well. She has  lower back pain, controlled with her current pain regimen.  She denies mouth sores, fever, chills, abdominal pain and SOB. Her appetite is good with weight loss.          Review of Systems   All systems reviewed, and found to be negative except for what is detailed above.      Health Status   Allergies:    Nonallergic Reactions (Selected)  Severity Not Documented  HYDROcodone- Altered mental state.,    Allergies (1) Active Reaction  HYDROcodone altered mental state     Current medications.Problem list:    Active Problems (9)  Bone metastases   Breast ca   Cancer, metastatic   Hepatic neoplasm secondary   Hypertension   Liver cancer, primary, with metastasis from liver to other site   Terminal Illness   Tobacco user   Tobacco user         Histories   Past Medical History:    Active  Cancer, metastatic ([id number])   Family History:    Mesothelioma  Father  Primary malignant neoplasm of breast  Other  Diabetes mellitus type 2  Mother  CAD (coronary artery disease)  Mother  High blood pressure                                                                                                                                                                                                                     09-AUG-2016 15:25:48<$>  Mother     Procedure history:    Mastectomy ([id number]) in the month of 2010 at 54 Years.  Comments:  7/3/2017 14:10 - Sherrell Hicks LPN  right  Mastectomy ([id number]).  Comments:  2017 08:02 - Paulina Smith LPN  left , right removed , then reconstruction  .  Comments:  2017 08:03 - Paulina Smith LPN    back surgery.  Comments:  2017 08:04 - Paulina Smith LPN  1979  Appendectomy; ([id number]).   Social History        Social & Psychosocial Habits    Alcohol  04/15/2017  Use: Never    Substance Abuse  04/15/2017  Use: Never    Tobacco  2017  Use: Current every day smoker    Tobacco use per day: 10    Comment:  started smokin cessation program thru [employer], medicaid ins. program - 07/03/2017 14:12 - Sherrell Hicks LPN  .        Physical Examination   General:  Alert and oriented, No acute distress.         Ambulation status: With steady gait.         Appearance: Well nourished.    Eye:  Extraocular movements are intact, Normal conjunctiva, wears glasses.    HENT:  Normocephalic, Normal hearing, Oral mucosa is moist, No pharyngeal erythema.    Neck:  Supple, Non-tender, No lymphadenopathy, No thyromegaly.    Respiratory:  Lungs are clear to auscultation, Respirations are non-labored, Symmetrical chest wall expansion, No chest wall tenderness.    Cardiovascular:  Normal rate, Regular rhythm, No murmur, No gallop, Good pulses equal in all extremities, Normal peripheral perfusion, no LE edema. Mild lymphedema of left arm.    Gastrointestinal:  Soft, Non-tender, Non-distended, Normal bowel sounds, No organomegaly.    Vital Signs   11/30/2017 8:42 CST      Peripheral Pulse Rate     90 bpm                             Respiratory Rate          18 br/min                             Systolic Blood Pressure   130 mmHg                             Diastolic Blood Pressure  84 mmHg     Measurements from flowsheet : Measurements   11/30/2017 8:42 CST      Weight Dosing             66.2 kg                             Weight Measured           66.2 kg                             Weight Measured and Calculated in Lbs     145.94 lb                             Height/Length Dosing      160 cm                             Height/Length Measured    160 cm                             BSA Measured              1.72 m2                             Body Mass Index Measured  25.86 kg/m2        Vital Signs (last 24 hrs)_____  Last Charted___________  Heart Rate Peripheral   90 bpm  (NOV 30 08:42)  Resp Rate         18 br/min  (NOV 30 08:42)  SBP      130 mmHg  (NOV 30 08:42)  DBP      84 mmHg  (NOV 30 08:42)  Weight      66.2 kg  (NOV 30  08:42)  Height      160 cm  (NOV 30 08:42)  BMI      25.86  (NOV 30 08:42)     Musculoskeletal:  Normal range of motion, Normal strength, No tenderness, No deformity.    Integumentary:  Warm, Intact, No rash.    Neurologic:  Alert, Oriented, Normal sensory, Normal motor function, No focal deficits, Cranial Nerves II-XII are grossly intact.    Cognition and Speech:  Oriented, Speech clear and coherent.    Psychiatric:  Cooperative, Appropriate mood & affect.    Breast:  bilateral breast reconstruction .    ECOG Performance Scale: 0 - Fully active; no performance restrictions.      Review / Management   Results review:  Lab results   11/30/2017 7:52 CST      Sodium Lvl                140 mmol/L                             Potassium Lvl             4.0 mmol/L                             Chloride                  104 mmol/L                             CO2                       29 mmol/L                             Calcium Lvl               9.3 mg/dL                             Glucose Lvl               99 mg/dL                             BUN                       18 mg/dL                             Creatinine                1.40 mg/dL  HI                             eGFR-AA                   49 mL/min  LOW                             eGFR-FLORI                  40 mL/min  LOW                             Bili Total                0.4 mg/dL                             Bili Direct               0.1 mg/dL                             Bili Indirect             0.3 mg/dL                             AST                       11 unit/L  LOW                             ALT                       23 unit/L                             Alk Phos                  91 unit/L                             Total Protein             7.2 gm/dL                             Albumin Lvl               3.4 gm/dL                             Globulin                  3.80 gm/mL  HI                             A/G Ratio                 1 ratio                              WBC                       2.8 x10(3)/mcL  LOW                             RBC                       3.45 x10(6)/mcL  LOW                             Hgb                       12.5 gm/dL                             Hct                       36.1 %                             Platelet                  190 x10(3)/mcL                             MCV                       104.6 fL  HI                             MCH                       36.2 pg  HI                             MCHC                      34.6 gm/dL                             RDW                       19.9 %  HI                             MPV                       9.0 fL                             Abs Neut                  1.21 x10(3)/mcL  LOW                             Neutro Auto               43 x10(3)/mcL  NA                             Lymph Auto                49 %  HI                             Mono Auto                 6 %                             Eos Auto                  1 %                             Abs Eos                   0.03 x10(3)/mcL  NA                             Basophil Auto             1 %                             Abs Neutro                1.21 x10(3)/mcL  NA                             Abs Lymph                 1.39 x10(3)/mcL  NA                             Abs Mono                  0.18 x10(3)/mcL  NA                             Abs Baso                  0.02 x10(3)/mcL  NA                             IG%                       0 %  NA                             IG#                       0.0100  NA  .    Laboratory Results   CA 27.29:  2017-4999   2017- greater than 450.        CA 15-3: 1836    Imagin/15/2017- widespread skeletal metastatic disease throughout the spine, bilateral ribs, pelvis, and proximal femur  CT C/A/P scattered subcentimeter lung nodules in the right lung which were not seen previously but could have been masked secondary to large pleural effusion on previous exam;  a small right pleural effusion; left lung nodules are not seen on the current exam; left pleural effusion has increased; widespread liver metastasis appears smaller; widespread skeletal metastases were again noted, worse at T6 and T7; stable mesenteric nodules.          Impression and Plan   1. Metastatic Adenocarcinoma Breast with malignant pleural effusion, mets to liver, bones and peritoneum  Faslodex started 06/07/2017.  Ibrance added 09/07/2017.  From extremely poor performance status, bedbound, extremely dramatic recovery with Faslodex.  As per scans on 09/15/2017, appears to be responding to Faslodex.  At this time, doing marvelously well.    2.  Osseous metastatic disease, now with upper and lower dentures    Pain controlled with Percocet 5mg, refilled today      Plan:  Continue Faslodex (due 12/1/2017) and Ibrance (currently on day 15)  Refill Percocet today  Follow CA 27.29 and CA-15-3  Continue Xgeva on 11/3/2017 to prevent skeletal events from bone metastasis.    Calcium plus vitamin D twice a day  Follow visit in 3-4 weeks (after scans) CBC, CMP, and tumor marker levels.  Restaging CT scans scheduled for 12-

## 2022-04-30 NOTE — PROGRESS NOTES
Patient:   Elen Kidd            MRN: [id number]            FIN: [id number]               Age:   62 years     Sex:  Female     :  1955   Associated Diagnoses:   None   Author:   Renu LARSEN, Marlene Mancera      Visit Information    1.  Recurrent metastatic breast cancer   4/15/2017     2. Left breast cancer upper outer quadrant, diagnosed 2009                    Treatment:  Taxotere/Cytoxan beginning on 10/1/09 and [place] in [place] [facility] [place]                             (patient reports 2 rounds of chemo)                                      Aromasin inhibitor for 1 month.  Patient did not continue this medication she says                                      because of loss of insurance.      Current Treatment  Faslodex monthly started on 2017    Ibrance 125 mg p.o. daily with food for 21 days followed by 7 days off, Started 2017  Xgeva subcu monthly, to start 11/3/2017    Treatment History   xeloda stopped on 17 painful mucositis  Right thoracentesis on 17, which showed cytomorphology of adenocarcinoma, consistent with a breast primary.    Clinical History   61-year-old  female  has  a history of  left breast primary as documented in PMH.  1. Left breast cancer upper outer quadrant                    Surgery: left lumpectomy 2009                    Pathology: invasive ductal ca                                         1.2 cm                                     grade ii                                DCIS                                 Margin : Positive                                Lymphovascular invasion                             ER 90%, ND 15%, Her 2 +1 by IHC                                   ki 67- 25%                          reexcision negative margin.                    Surgery: left mastectomy  2009                    Pathology:  invasive ductal carcinoma                                       largest tumor nodule 1.1, smaller nodule 0.5 cm                                         DCIS + high grade                                       lymphatic invasion: NO                                      perineural invasion: NO                                       Margin : Positive  to ink margin of largest                                       0 of 21 nodes                                       ER +/AK +,   HER2 1+ by IHC                    Stage: unknown                    Treatment:  Taxotere/Cytoxan beginning on 10/1/09 and [place] [facility] [place]                             (patient reports 2 rounds of chemo)                                      Aromasin inhibitor for 1 month.  Patient did not continue this medication she says                                      because of loss of insurance.                 2.  Recurrence:  CT chest/abd/pelvis 4/15/2017 -subcarinal node 2.1 x 3.0 cm                                   Right hilar lymph node 1.6 x 1.5 cm                                 Extensive left internal mammary lymphadenopathy  3.6 x 4.4 cm                             enlarged internal mammary and left superior mediastinal                                        lymph nodes as well.                             Right anterior mediastinal lymph nodes 1.0 x 1.5 cm                              Right para-aortic lymph node  1.2 x 2.2 cm .                                Multiple enlarged paracardiac/cardiophrenic lymph nodes 1.7 x 2.0 cm                              Large right and moderate left pleural effusions.                        Multifocal right pleural soft tissue nodularity 2.4 x 1.7 cm                      Numerous pulmonary nodules  8 mm RUL                                  10 mm in the left lower                                      7 mm in the left upper                      Extensive osseous metastatic disease with                          involvement of the thoracic spine, sternum, and suspected involvement                                of  several ribs.                         Innumerable hepatic metastatic lesions 4.0 x 3.6 cm and 3.1x 2.6 cm                        Multiple small retroperitoneal nodes.                          nodule right lower quadrant measuring 1.2 x 0.8 cm                      Metastatic deposit in the retroperitoneum lateral to the right kidney 2.1 x 2.7 cm                   Treatment: Faslodex initiated 6/2017                                    Xeloda only taken for 2 weeks 6/2017               3. [id number]--1,999--5/16/2017    .  She underwent lumpectomy followed by chemotherapy with Taxotere/Cytoxan beginning on 10/1/09 and ending in January 2010 at [facility] [place] ([place] reports 2 rounds of chemo), then Aromasin inhibitor for 1 month.  Patient did not continue this medication she says because of loss of insurance.    She presented to the ER on 4/15/17 with three-month history of left upper quadrant pain, nausea/vomiting. and progressive shortness of breath. Past medical history significant for heavy smoking.    CT imaging identified extensive metastatic disease with mediastinal, left internal mammary lymphadenopathy and extensive pleural metastatic deposits with large right and moderate left pleural effusions.  Also noted were numerous pulmonary nodules, innumerable hepatic metastases, multiple intra-abdominal and retroperitoneal implants, and extensive osseous metastasis.    Patient underwent a right thoracentesis on 5/2/17, which showed cytomorphology of adenocarcinoma, consistent with a breast primary.       Started Faslodex on 06/07/2014, with dramatic turnaround in her general health subsequently.  Started concurrent Ibrance on 09/07/2017.    With Faslodex, her general health has made a dramatic turnaround.  From being bedbound until 2 or 3 months back, she became up and about, and bubbling with energy.  Enjoying life.  Enjoying her family's company as well as reunion.  She mows her own glass in her backyard.  Appetite is great.   The only symptom she has is pain in hips and lower back, 4 out on a scale of 1-10, if 10 could be the worst.  Takes tramadol only sparingly.  Before starting treatment, she had lost 30 pounds in last 6 months, but says that she has regained 15 pounds back.  Denies neurological symptoms chest pain shortness of breath abdominal pain nausea vomiting etc.    Presents for a follow-up visit.  Extremely well.  Bubbling with energy.  Excellent appetite.  No significant aches or pains.  On 09/07/2017, CA 27.29 level was greater than 450.  It was 4999 on 05/16/2017.  CEA level 10.2.  CBC unremarkable.  CA 15-3 level elevated to 1836.  Whole-body bone scan on 09/15/2017 showed diffuse hypermetabolic activity throughout the spine, bilateral ribs, pelvis, and proximal femora, possibly with some increased activity in the calvarium as well, all findings consistent with widespread skeletal metastasis.  Follow-up CT scans of abdomen pelvis on 09/15/2017 showed a few scattered subcentimeter lung nodules in the right lung which were not seen previously but could have been masked secondary to large pleural effusion on previous exam; a small right pleural effusion; left lung nodules are not seen on the current exam; left pleural effusion has increased; widespread liver metastasis appears smaller; widespread skeletal metastases were again noted, worse at T6 and T7; stable mesenteric nodules.          Interval History     Patient presents today for follow up. Currently on cycle #2 of Ibrance, started on 10-. She is doing very well. She is currently on Cycle 2 Day 6 of Ibrance, tolerating well without significant toxicities. She has no regular use of pain medication, occasionally takes tramadol for lower back pain, which works well for her. She denies mouth sores, fever, chills, abdominal pain and SOB.  In interim, she had a dental examination and extractions, now with upper and lower dentures. She will start Xgeva, pending  acquisition and approval from her insurance.           Review of Systems   All systems reviewed, and found to be negative except for what is detailed above.      Health Status   Allergies:    Nonallergic Reactions (Selected)  Severity Not Documented  HYDROcodone- Altered mental state.,    Allergies (1) Active Reaction  HYDROcodone altered mental state     Current medications:  (Selected)   Prescriptions  Prescribed  Ibrance 125 mg oral capsule: 125 mg = 1 cap(s), Oral, Daily, daily for 21 days, then off for 7 days, # 21 cap(s), 4 Refill(s)  Percocet 5/325 oral tablet: 1 tab(s), Oral, q6hr, PRN PRN for pain, # 120 tab(s), 0 Refill(s)  Xanax 0.5 mg oral tablet: 0.5 mg = 1 tab(s), Oral, TID, # 90 tab(s), 0 Refill(s)  docusate-senna 50 mg-8.6 mg oral tablet: 2 tab(s), Oral, Once a day (at bedtime), X 30 day(s), # 60 tab(s), 5 Refill(s), Pharmacy: [facility]  Documented Medications  Documented  Seroquel 50 mg oral tablet: 50 mg = 1 tab(s), Oral, At HS, 0 Refill(s)  amlodipine 5 mg oral tablet: 5 mg = 1 tab(s), Oral, Daily, # 30 tab(s), 0 Refill(s)  dexamethasone 1 mg oral tablet: 1 tab, Oral, Daily, 0 Refill(s)  haloperidol 1 mg oral tablet: 1 mg = 1 tab(s), Oral, q4hr, PRN PRN nausea/vomiting, 0 Refill(s)  traMADol 50 mg oral tablet: 50 mg = 1 tab(s), Oral, q6hr, PRN PRN as needed for pain, 0 Refill(s)   Problem list:    Active Problems (8)  Breast ca   Cancer, metastatic   Hepatic neoplasm secondary   Hypertension   Liver cancer, primary, with metastasis from liver to other site   Terminal Illness   Tobacco user   Tobacco user         Histories   Past Medical History:    Active  Cancer, metastatic ([id number])   Family History:    Mesothelioma  Father  Primary malignant neoplasm of breast  Other  Diabetes mellitus type 2  Mother  CAD (coronary artery disease)  Mother  High blood pressure                                                                                                                                                                                                                      09-AUG-2016 15:25:48<$>  Mother     Procedure history:    Mastectomy ([id number]) in the month of 2010 at 54 Years.  Comments:  7/3/2017 14:10 - Sherrell Hicks LPN  right  Mastectomy ([id number]).  Comments:  2017 08:02 - Paulina Smith LPN  left -, right removed , then reconstruction  .  Comments:  2017 08:03 - Paulina Smith LPN  10-  back surgery.  Comments:  2017 08:04 - Paulina Smith LPN  1979  Appendectomy; ([id number]).   Social History        Social & Psychosocial Habits    Alcohol  04/15/2017  Use: Never    Substance Abuse  04/15/2017  Use: Never    Tobacco  2017  Use: Current every day smoker    Tobacco use per day: 10    Comment: started smokin cessation program thru [employer], medicaid ins. program - 2017 14:12 - Sherrell Hicks LPN  .        Physical Examination   General:  Alert and oriented, No acute distress, Thin but moves easily on and off exam table with no assistance..    Eye:  Extraocular movements are intact, Normal conjunctiva, wears glasses.    HENT:  Normocephalic, Normal hearing, Oral mucosa is moist, No pharyngeal erythema.    Neck:  Supple, Non-tender, No lymphadenopathy, No thyromegaly.    Respiratory:  Lungs are clear to auscultation, Respirations are non-labored, Symmetrical chest wall expansion, No chest wall tenderness.    Cardiovascular:  Normal rate, Regular rhythm, No murmur, No gallop, Good pulses equal in all extremities, Normal peripheral perfusion, no LE edema. Mild lymphedema of left arm.    Gastrointestinal:  Soft, Non-tender, Non-distended, Normal bowel sounds, No organomegaly.    Vital Signs   10/23/2017 12:46 CDT     Temperature Oral          36.7 DegC                             Peripheral Pulse Rate     68 bpm                             Respiratory Rate          18 br/min                              Systolic Blood Pressure   147 mmHg  HI                             Diastolic Blood Pressure  88 mmHg     Measurements from flowsheet : Measurements   10/23/2017 12:46 CDT     Weight Dosing             64.7 kg                             Weight Measured           64.7 kg                             Weight Measured and Calculated in Lbs     142.64 lb                             Height/Length Dosing      161 cm                             Height/Length Measured    161 cm                             BSA Measured              1.7 m2                             Body Mass Index Measured  24.96 kg/m2        Vital Signs (last 24 hrs)_____  Last Charted___________  Temp Oral     36.7 DegC  (OCT 23 12:46)  Heart Rate Peripheral   68 bpm  (OCT 23 12:46)  Resp Rate         18 br/min  (OCT 23 12:46)  SBP      H 147mmHg  (OCT 23 12:46)  DBP      88 mmHg  (OCT 23 12:46)  Weight      64.7 kg  (OCT 23 12:46)  Height      161 cm  (OCT 23 12:46)  BMI      24.96  (OCT 23 12:46)     Musculoskeletal:  Normal range of motion, Normal strength, No tenderness, No deformity.    Integumentary:  Warm, Intact.    Neurologic:  Alert, Oriented, Normal sensory, Normal motor function, No focal deficits, Cranial Nerves II-XII are grossly intact.    Cognition and Speech:  Oriented, Speech clear and coherent.    Psychiatric:  Cooperative, Appropriate mood & affect.    Breast:  bilateral breast reconstruction .    ECOG Performance Scale: 0 - Fully active; no performance restrictions.      Review / Management   Laboratory Results   Today's Lab Results : PowerNote Discrete Results   10/23/2017 12:19 CDT     WBC                       3.4 x10(3)/mcL  LOW                             RBC                       3.62 x10(6)/mcL  LOW                             Hgb                       12.4 gm/dL                             Hct                       35.8 %                             Platelet                  354 x10(3)/mcL                             MCV                        98.9 fL                             MCH                       34.3 pg  HI                             MCHC                      34.6 gm/dL                             RDW                       17.2 %  HI                             MPV                       9.3 fL                             Abs Neut                  2.35 x10(3)/mcL                             Neutro Auto               70 x10(3)/mcL  NA                             Lymph Auto                23 %                             Mono Auto                 5 %                             Basophil Auto             2 %  HI                             Abs Neutro                2.35 x10(3)/mcL  NA                             Abs Lymph                 0.77 x10(3)/mcL  NA                             Abs Mono                  0.16 x10(3)/mcL  NA                             Abs Baso                  0.06 x10(3)/mcL  NA                             IG%                       0 %  NA                             IG#                       0.0100  NA                             Sodium Lvl                138 mmol/L                             Potassium Lvl             4.1 mmol/L                             Chloride                  106 mmol/L                             CO2                       26 mmol/L                             Calcium Lvl               9.9 mg/dL                             Glucose Lvl               128 mg/dL  HI                             BUN                       10 mg/dL                             Creatinine                0.90 mg/dL                             eGFR-AA                   81 mL/min  LOW                             eGFR-FLORI                  67 mL/min  LOW                             Bili Total                0.3 mg/dL                             Bili Direct               0.1 mg/dL                             Bili Indirect             0.2 mg/dL                             AST                       14 unit/L  LOW                              ALT                       26 unit/L                             Alk Phos                  103 unit/L                             Total Protein             7.3 gm/dL                             Albumin Lvl               3.4 gm/dL                             Globulin                  3.90 gm/mL  HI                             A/G Ratio                 1 ratio           Impression and Plan   1. Metastatic Adenocarcinoma Breast with malignant pleural effusion, mets to liver, bones and peritoneaum.    Faslodex started 06/07/2017.  Ibrance added 09/07/2017.  From extremely poor performance status, bedbound, extremely dramatic recovery with Faslodex.  As per scans on 09/15/2017, appears to be responding to Faslodex.  At this time, doing marvelously well.    2.  Osseous metastatic disease, now with upper and lower dentures      Plan:  Continue Faslodex (due 11/3/2017) and Ibrance.    Repeat CBC on 11/3/2017  Follow CA 27.29 and CA-15-3  Start Xgeva on 11/3/2017 to prevent skeletal events from bone metastasis.    Calcium plus vitamin D twice a day  Follow visit in 1 month, with CBC, CMP, and tumor marker levels.  Restaging CT scans scheduled for 12-

## 2022-04-30 NOTE — PROGRESS NOTES
Patient:   Elen Kidd            MRN: [id number]            FIN: [id number]               Age:   62 years     Sex:  Female     :  1955   Associated Diagnoses:   None   Author:   Kathy England      Chief Complaint   2018 13:08 CST      breast cancer, ecog 0, pain in hips 6/10        Visit Information    1.  Recurrent metastatic breast cancer   4/15/2017     2. Left breast cancer upper outer quadrant, diagnosed 2009  - Treatment:  Taxotere/Cytoxan beginning on 10/1/09 and ending in 2010 at [facility] (patient reports 2 rounds of chemo)  - Aromasin inhibitor for 1 month.  Patient did not continue this medication she says because of loss of insurance.      Current Treatment  Faslodex monthly started on 2017    Ibrance 125 mg p.o. daily with food for 21 days followed by 7 days off, Started 2017  Xgeva subcu monthly, to start 11/3/2017    Treatment History   xeloda stopped on 17 painful mucositis  Right thoracentesis on 17, which showed cytomorphology of adenocarcinoma, consistent with a breast primary.    Clinical History  1. Left breast cancer upper outer quadrant ER 90%, OH 15%, Her 2 +1 by IHC, ki 67- 25%  Surgery: left lumpectomy 2009.  Pathology: 1.2 cm invasive ductal ca, grade 2.  Lymphovascular invasion, positive margin  Surgery: left mastectomy  2009.  Pathology:  invasive ductal carcinoma largest tumor nodule 1.1, smaller nodule 0.5 cm.  DCIS + high grade lymphatic invasion: NO.  Perineural invasion: NO.   Margin: Positive to ink margin of largest 0 of 21 nodes ER +/OH +,   HER2 1+ by IHC.  Stage: unknown.  Treatment: Taxotere/Cytoxan beginning on 10/1/09 and ending in 2010 at [facility]  (patient reports 2 rounds of chemo).  Aromasin inhibitor for 1 month.  Patient did not continue this medication she says because of loss of insurance.   She presented to the ER on 4/15/17 with three-month history of left upper quadrant pain,  nausea/vomiting and progressive shortness of breath. Past medical history significant for heavy smoking.    CT imaging identified extensive metastatic disease. She underwent a right thoracentesis on 5/2/17, which showed cytomorphology of adenocarcinoma, consistent with a breast primary.    Recurrence:  CT chest/abd/pelvis 4/15/2017 -subcarinal node 2.1 x 3.0 cm, Right hilar lymph node 1.6 x 1.5 cm. Extensive left internal mammary lymphadenopathy  3.6 x 4.4 cm enlarged internal mammary and left superior mediastinal lymph nodes as well. Right anterior mediastinal lymph nodes 1.0 x 1.5 cm.  Right para-aortic lymph node  1.2 x 2.2 cm. Multiple enlarged paracardiac/cardiophrenic lymph nodes 1.7 x 2.0 cm. Large right and moderate left pleural effusions.  Multifocal right pleural soft tissue nodularity 2.4 x 1.7 cm. Numerous pulmonary nodules. Extensive osseous metastatic disease with involvement of the thoracic spine, sternum, and suspected involvement of several ribs. Innumerable hepatic metastatic lesions 4.0 x 3.6 cm and 3.1x 2.6 cm. Multiple small retroperitoneal nodes. Metastatic deposit in the retroperitoneum lateral to the right kidney 2.1 x 2.7 cm. Xeloda stopped on 6-8-17 2/2 painful mucositis. Started Faslodex on 06/07/2014, with dramatic turn around in her general health.      9/07/2017 Started concurrent Ibrance 125mg po daily.  With Faslodex, her general health has made a dramatic turnaround.  From being bedbound until 2 or 3 months back, she became up and about, and bubbling with energy.  Enjoying life.  Enjoying her family's company as well as reunion.  She mows her own glass in her backyard.  Appetite is great.  The only symptom she has is pain in hips and lower back, 4 out on a scale of 1-10, if 10 could be the worst.  Takes tramadol only sparingly.  Before starting treatment, she had lost 30 pounds in last 6 months, but says that she has regained 15 pounds back.  Denies neurological symptoms chest pain  shortness of breath abdominal pain nausea vomiting etc.    Presents for a follow-up visit.  Extremely well.  Bubbling with energy.  Excellent appetite.  No significant aches or pains.  9/07/2017, CA 27.29 level was greater than 450.  It was 4999 on 05/16/2017.  CEA level 10.2.  CBC unremarkable.  CA 15-3 level elevated to 1836.  Whole-body bone scan on 09/15/2017 showed diffuse hypermetabolic activity throughout the spine, bilateral ribs, pelvis, and proximal femora, possibly with some increased activity in the calvarium as well, all findings consistent with widespread skeletal metastasis.  Follow-up CT scans of abdomen pelvis on 09/15/2017 showed a few scattered subcentimeter lung nodules in the right lung which were not seen previously but could have been masked secondary to large pleural effusion on previous exam; a small right pleural effusion; left lung nodules are not seen on the current exam; left pleural effusion has increased; widespread liver metastasis appears smaller; widespread skeletal metastases were again noted, worse at T6 and T7; stable mesenteric nodules.        Interval History   Patient presents today for follow up. She endorses no new complaints. She reports fatigue is her biggest side effects from treatment. She reports some time she stays in bed until noon, has joint stiffness when she gets, but once she gets moving she is able to do the tasks that needs to be done for the day. She denies having any nausea, vomiting, diarrhea, constipation, fever, chills, night sweats, cough, shortness of breath, headaches, chest pain. She is scheduled tomorrow for Xgeva and Faslodex. Today is day 8 of [facility]. Her labs are adequate, WBC 3.3, ANC 2460. She continues to have pain in her hips.       Review of Systems   All systems reviewed, and found to be negative except for what is detailed above.      Health Status   Allergies:    Nonallergic Reactions (Selected)  Severity Not Documented  HYDROcodone-  Altered mental state.,    Allergies (1) Active Reaction  HYDROcodone altered mental state     Current medications:  (Selected)   Prescriptions  Prescribed  Ibrance 125 mg oral capsule: 125 mg = 1 cap(s), Oral, Daily, daily for 21 days, then off for 7 days, # 21 cap(s), 4 Refill(s), [facility]: [facility] (prev. [facility])  Percocet 5/325 oral tablet: 1 tab(s), Oral, q6hr, PRN PRN for pain, # 120 tab(s), 0 Refill(s)  Xanax 0.5 mg oral tablet: 0.5 mg = 1 tab(s), Oral, TID, # 90 tab(s), 0 Refill(s)  calcium (as carbonate)-vitamin D 500 mg-400 intl units oral tablet: 1 tab(s), Oral, BID, # 60 tab(s), 11 Refill(s), Pharmacy: [facility]  Documented Medications  Documented  Seroquel 50 mg oral tablet: 50 mg = 1 tab(s), Oral, At HS, 0 Refill(s)  amlodipine 5 mg oral tablet: 5 mg = 1 tab(s), Oral, Daily, # 30 tab(s), 0 Refill(s)  dexamethasone 1 mg oral tablet: 1 tab, Oral, Daily, 0 Refill(s)  haloperidol 1 mg oral tablet: 1 mg = 1 tab(s), Oral, q4hr, PRN PRN nausea/vomiting, 0 Refill(s)  traMADol 50 mg oral tablet: 50 mg = 1 tab(s), Oral, q6hr, PRN PRN as needed for pain, 0 Refill(s)   Problem list:    All Problems  Breast ca / SNOMED CT [id number] / Confirmed  Cancer, metastatic / SNOMED CT [id number] / Confirmed  Hypertension / SNOMED CT 10645708 / Confirmed  Liver cancer, primary, with metastasis from liver to other site / SNOMED CT [id number] / Confirmed  Bone metastases / SNOMED CT 750992768 / Confirmed  Hepatic neoplasm secondary / SNOMED CT 264422033 / Confirmed  Terminal Illness / SNOMED CT 172152909 / Confirmed  Tobacco user / SNOMED CT 758660291 / Probable  Tobacco user / SNOMED CT 388139617 / Probable,    Active Problems (9)  Bone metastases   Breast ca   Cancer, metastatic   Hepatic neoplasm secondary   Hypertension   Liver cancer, primary, with metastasis from liver to other site   Terminal Illness   Tobacco user          Histories   Past Medical History:    Active  Cancer, metastatic ([id number])   Family History:    Mesothelioma  Father  Primary malignant neoplasm of breast  Other (Aunt)  Diabetes mellitus type 2  Mother  CAD (coronary artery disease)  Mother  High blood pressure                                                                                                                                                                                                                     09-AUG-2016 15:25:48<$>  Mother     Procedure history:    Mastectomy ([id number]) in the month of 2010 at 54 Years.  Comments:  7/3/2017 14:10 - Sherrell Hicks LPN  right  Mastectomy ([id number]).  Comments:  2017 08:02 - Paulina Smith LPN  left -, right removed , then reconstruction  .  Comments:  2017 08:03 - Paulina Smith LPN  10-  back surgery.  Comments:  2017 08:04 - Paulina Smith LPN  1979  Appendectomy; ([id number]).   Social History        Social & Psychosocial Habits    Alcohol  04/15/2017  Use: Never    Substance Abuse  04/15/2017  Use: Never    Tobacco  2017  Use: Current every day smoker    Tobacco use per day: 10    Comment: started smokin cessation program thru [employer], medicaid ins. program - 2017 14:12 - Sherrell Hicks LPN  .        Physical Examination   Vital Signs   2018 13:08 CST      Temperature Oral          37.1 DegC                             Temperature Oral (calculated)             98.78 DegF                             Peripheral Pulse Rate     68 bpm                             Respiratory Rate          20 br/min                             SpO2                      97 %                             Systolic Blood Pressure   126 mmHg                             Diastolic Blood Pressure  84 mmHg     Measurements from flowsheet : Measurements   2018 13:08 CST      Weight Dosing             67.5 kg                              Weight Measured           67.5 kg                             Weight Measured and Calculated in Lbs     148.81 lb                             Height/Length Dosing      160 cm                             Height/Length Measured    160 cm                             BSA Measured              1.73 m2                             Body Mass Index Measured  26.37 kg/m2     General:  Alert and oriented, No acute distress.         Ambulation status: With steady gait.         Appearance: Well nourished.    Eye:  Extraocular movements are intact, Normal conjunctiva, wears glasses.    HENT:  Normocephalic, Normal hearing, Oral mucosa is moist, No pharyngeal erythema.    Neck:  Supple, Non-tender, No lymphadenopathy, No thyromegaly.    Respiratory:  Lungs are clear to auscultation, Respirations are non-labored, Symmetrical chest wall expansion, No chest wall tenderness.    Cardiovascular:  Normal rate, Regular rhythm, No murmur, No gallop, Good pulses equal in all extremities, Normal peripheral perfusion, no LE edema. Mild lymphedema of left arm.    Gastrointestinal:  Soft, Non-tender, Non-distended, Normal bowel sounds, No organomegaly.    Musculoskeletal:  Normal range of motion, Normal strength, No tenderness, No deformity.    Integumentary:  Warm, Intact, No rash, left arm lymphedema, mild.    Neurologic:  Alert, Oriented, Normal sensory, Normal motor function, No focal deficits, Cranial Nerves II-XII are grossly intact.    Cognition and Speech:  Oriented, Speech clear and coherent.    Psychiatric:  Cooperative, Appropriate mood & affect.    Breast:  bilateral breast reconstruction .    The [employer] Toxicity Scores:   Toxicity Grading   1/25/2018 13:08 CST      Weight Dosing             67.5 kg                             Weight Measured           67.5 kg                             Weight Measured and Calculated in Lbs     148.81 lb                             Height/Length Dosing      160 cm                              Height/Length Measured    160 cm                             BSA Measured              1.73 m2                             Body Mass Index Measured  26.37 kg/m2                             Weight Change > 10lbs in 6 Months         No                             Temperature Oral          37.1 DegC                             Temperature Oral (calculated)             98.78 DegF                             Peripheral Pulse Rate     68 bpm                             Respiratory Rate          20 br/min                             SpO2                      97 %                             Systolic Blood Pressure   126 mmHg                             Diastolic Blood Pressure  84 mmHg                             Feeling Down, Depressed, Hopeless         Not at all                             Little Interest - Pleasure in Activities  Not at all                             Initial Depression Screen Score           0                             ADLs                      Independent                             History of Fall in Last 3 Months Delcid    No                             Presence of Secondary Diagnosis Delcid     No                             Use of Ambulatory Aid Delcid               None, bedrest, wheelchair, nurse                             IV/Heparin Lock Fall Risk Delcid           No                             Gait Weak or Impaired Fall Risk Delcid     Normal, bedrest, immobile                             Mental Status Fall Risk Delcid             Oriented to own ability                             Delcid Fall Risk Score     0                             Appetite                  Fair                             Home Diet                 Regular                             Meal Pattern              2-3 meals per day                             Feeding Ability           Complete independence                             Perception of Body Size   Just right                             Chief  Complaint           breast cancer, ecog 0, pain in hips 6/10                             Information Given by      Self                             Languages                 English                             Preferred Communication Mode              Verbal                             Influenza Vaccine Status  Yes                             Barriers to Learning      None evident                             Ed-Plan of Care           Verbalizes understanding                             Responsible Learner Present for Session   Yes                             Teaching Method           Explanation                             Reg PN Influenza Vaccine Refusal vA       No                             Reg Influenza Vaccine Shortage vA         No                             Social History Grid Complete              Yes                             Smoking Cessation Counseling              No                             Malnutrition Score        0                             Depression Screening Pos/Neg              Negative                             Cigarette Smoking Last 365 Days           Yes                             Adult Amb Care Intake and History - Text  Adult Ambulatory Care Intake and History    1/25/2018 11:25 CST      Sodium Lvl                144 mmol/L                             Potassium Lvl             3.8 mmol/L                             Chloride                  112 mmol/L  HI                             CO2                       26 mmol/L                             Calcium Lvl               9.0 mg/dL                             Glucose Lvl               124 mg/dL  HI                             BUN                       11 mg/dL                             Creatinine                1.00 mg/dL                             eGFR-AA                   72 mL/min  LOW                             eGFR-FLORI                  59 mL/min  LOW                             Bili Total                0.5 mg/dL                              Bili Direct               0.1 mg/dL                             Bili Indirect             0.4 mg/dL                             AST                       13 unit/L  LOW                             ALT                       18 unit/L                             Alk Phos                  76 unit/L                             Total Protein             6.8 gm/dL                             Albumin Lvl               3.1 gm/dL  LOW                             Globulin                  3.70 gm/mL  HI                             A/G Ratio                 1 ratio                             WBC                       3.3 x10(3)/mcL  LOW                             RBC                       2.96 x10(6)/mcL  LOW                             Hgb                       12.1 gm/dL                             Hct                       33.7 %  LOW                             Platelet                  169 x10(3)/mcL                             MCV                       113.9 fL  HI                             MCH                       40.9 pg  HI                             MCHC                      35.9 gm/dL                             RDW                       15.0 %  HI                             MPV                       9.7 fL                             Abs Neut                  2.46 x10(3)/mcL     ECOG Performance Scale: 1- Strenuous physical activity restricted; fully ambulatory and able to carry out light work.      Review / Management   Results review:  Lab results   1/25/2018 11:25 CST      Sodium Lvl                144 mmol/L                             Potassium Lvl             3.8 mmol/L                             Chloride                  112 mmol/L  HI                             CO2                       26 mmol/L                             Calcium Lvl               9.0 mg/dL                             Glucose Lvl               124 mg/dL  HI                             BUN                        11 mg/dL                             Creatinine                1.00 mg/dL                             eGFR-AA                   72 mL/min  LOW                             eGFR-FLORI                  59 mL/min  LOW                             Bili Total                0.5 mg/dL                             Bili Direct               0.1 mg/dL                             Bili Indirect             0.4 mg/dL                             AST                       13 unit/L  LOW                             ALT                       18 unit/L                             Alk Phos                  76 unit/L                             Total Protein             6.8 gm/dL                             Albumin Lvl               3.1 gm/dL  LOW                             Globulin                  3.70 gm/mL  HI                             A/G Ratio                 1 ratio                             WBC                       3.3 x10(3)/mcL  LOW                             RBC                       2.96 x10(6)/mcL  LOW                             Hgb                       12.1 gm/dL                             Hct                       33.7 %  LOW                             Platelet                  169 x10(3)/mcL                             MCV                       113.9 fL  HI                             MCH                       40.9 pg  HI                             MCHC                      35.9 gm/dL                             RDW                       15.0 %  HI                             MPV                       9.7 fL                             Abs Neut                  2.46 x10(3)/mcL  .       Impression and Plan     1. Metastatic Adenocarcinoma Breast with malignant pleural effusion, mets to liver, bones and peritoneum   -Faslodex started 06/07/2017.  Ibrance added 09/07/2017.  From extremely poor performance status, bedbound, extremely dramatic recovery with Faslodex.   -As per scans on 12/20/17, pt continues to have  response to chemotherapy. Mets are either stable or decreased in size.    -At this time, doing marvelously well.    2.  Osseous metastatic disease, now with upper and lower dentures    -Pain controlled with Percocet 5mg, refilled today      Plan:  Continue Faslodex and Ibrance   Proceed with Faslodex and Xgeva tomorrow.  Follow CA 27.29 and CA-15-3  Continue Xgeva  Calcium plus vitamin D twice a day  Follow up visit in 4 weeks for TD, w/ CA 27.29 and CA-15-3  Restaging CT scans in 3 months w/ NM Bone Scan, 3/21/18.

## 2022-04-30 NOTE — PROGRESS NOTES
Patient:   Elen Kidd            MRN: [id number]            FIN: [id number]               Age:   62 years     Sex:  Female     :  1955   Associated Diagnoses:   None   Author:   Kathy England      Chief Complaint   2018 9:45 CST       breast cancer        Visit Information    1.  Recurrent metastatic breast cancer   4/15/2017     2. Left breast cancer upper outer quadrant, diagnosed 2009  - Treatment:  Taxotere/Cytoxan beginning on 10/1/09 and ending in 2010 at [facility] [place] (patient reports 2 rounds of chemo)  - Aromasin inhibitor for 1 month.  Patient did not continue this medication she says because of loss of insurance.      Current Treatment  Faslodex monthly started on 2017    Ibrance 125 mg p.o. daily with food for 21 days followed by 7 days off, Started 2017  Xgeva subcu monthly, to start 11/3/2017    Treatment History   xeloda stopped on 17 painful mucositis  Right thoracentesis on 17, which showed cytomorphology of adenocarcinoma, consistent with a breast primary.    Clinical History  1. Left breast cancer upper outer quadrant ER 90%, MN 15%, Her 2 +1 by IHC, ki 67- 25%  Surgery: left lumpectomy 2009.  Pathology: 1.2 cm invasive ductal ca, grade 2.  Lymphovascular invasion, positive margin  Surgery: left mastectomy  2009.  Pathology:  invasive ductal carcinoma largest tumor nodule 1.1, smaller nodule 0.5 cm.  DCIS + high grade lymphatic invasion: NO.  Perineural invasion: NO.   Margin: Positive to ink margin of largest 0 of 21 nodes ER +/MN +,   HER2 1+ by IHC.  Stage: unknown.  Treatment: Taxotere/Cytoxan beginning on 10/1/09 and ending in 2010 at [facility] [place]  (patient reports 2 rounds of chemo).  Aromasin inhibitor for 1 month.  Patient did not continue this medication she says because of loss of insurance.   She presented to the ER on 4/15/17 with three-month history of left upper quadrant pain, nausea/vomiting and progressive shortness  of breath. Past medical history significant for heavy smoking.    CT imaging identified extensive metastatic disease. She underwent a right thoracentesis on 5/2/17, which showed cytomorphology of adenocarcinoma, consistent with a breast primary.    Recurrence:  CT chest/abd/pelvis 4/15/2017 -subcarinal node 2.1 x 3.0 cm, Right hilar lymph node 1.6 x 1.5 cm. Extensive left internal mammary lymphadenopathy  3.6 x 4.4 cm enlarged internal mammary and left superior mediastinal lymph nodes as well. Right anterior mediastinal lymph nodes 1.0 x 1.5 cm.  Right para-aortic lymph node  1.2 x 2.2 cm. Multiple enlarged paracardiac/cardiophrenic lymph nodes 1.7 x 2.0 cm. Large right and moderate left pleural effusions.  Multifocal right pleural soft tissue nodularity 2.4 x 1.7 cm. Numerous pulmonary nodules. Extensive osseous metastatic disease with involvement of the thoracic spine, sternum, and suspected involvement of several ribs. Innumerable hepatic metastatic lesions 4.0 x 3.6 cm and 3.1x 2.6 cm. Multiple small retroperitoneal nodes. Metastatic deposit in the retroperitoneum lateral to the right kidney 2.1 x 2.7 cm. Xeloda stopped on 6-8-17 2/2 painful mucositis. Started Faslodex on 06/07/2014, with dramatic turn around in her general health.      9/07/2017 Started concurrent Ibrance 125mg po daily.  With Faslodex, her general health has made a dramatic turnaround.  From being bedbound until 2 or 3 months back, she became up and about, and bubbling with energy.  Enjoying life.  Enjoying her family's company as well as reunion.  She mows her own glass in her backyard.  Appetite is great.  The only symptom she has is pain in hips and lower back, 4 out on a scale of 1-10, if 10 could be the worst.  Takes tramadol only sparingly.  Before starting treatment, she had lost 30 pounds in last 6 months, but says that she has regained 15 pounds back.  Denies neurological symptoms chest pain shortness of breath abdominal pain nausea  vomiting etc.    Presents for a follow-up visit.  Extremely well.  Bubbling with energy.  Excellent appetite.  No significant aches or pains.  9/07/2017, CA 27.29 level was greater than 450.  It was 4999 on 05/16/2017.  CEA level 10.2.  CBC unremarkable.  CA 15-3 level elevated to 1836.  Whole-body bone scan on 09/15/2017 showed diffuse hypermetabolic activity throughout the spine, bilateral ribs, pelvis, and proximal femora, possibly with some increased activity in the calvarium as well, all findings consistent with widespread skeletal metastasis.  Follow-up CT scans of abdomen pelvis on 09/15/2017 showed a few scattered subcentimeter lung nodules in the right lung which were not seen previously but could have been masked secondary to large pleural effusion on previous exam; a small right pleural effusion; left lung nodules are not seen on the current exam; left pleural effusion has increased; widespread liver metastasis appears smaller; widespread skeletal metastases were again noted, worse at T6 and T7; stable mesenteric nodules.     Dx & staging      Interval History   1/25/18:  Patient presents today for follow up. She endorses no new complaints. She reports fatigue is her biggest side effects from treatment. She reports some time she stays in bed until noon, has joint stiffness when she gets, but once she gets moving she is able to do the tasks that needs to be done for the day. She denies having any nausea, vomiting, diarrhea, constipation, fever, chills, night sweats, cough, shortness of breath, headaches, chest pain. She is scheduled tomorrow for Xgeva and Faslodex. Today is day 8 of [facility]. Her labs are adequate, WBC 3.3, ANC 2460. She continues to have pain in her hips.  2/21/18: Patient presents for treatment visit today. She has no new complaints. She reports her fatigue is not as bad as it was as reported at her last visit. She has gone outside and worked in her garden a few days while the sun  without. She does have some pain which is controlled with her Percocet. She denies any nausea, vomiting, diarrhea, constipation, fever, chills, night sweats, cough, headaches, chest pain. She has a few more days of Ibrance remaining and she'll be on her week off. Tomorrow she is scheduled for Xgeva and Faslodex. Her white count today is 2.6, ANC 1660. Her tumor markers are showing improvement. CA-27-29 is down to 428 on 1/25/18, from 755 and December. And the CA 15-3 is down to 363 on 1/25/18 from 701 December. The results of these markers are still pending today.      Review of Systems   A complete 12 point ROS was performed in full with pertinent positives as described in interval history. Remainder of ROS done in full and are negative.      Health Status   Allergies:    Nonallergic Reactions (Selected)  Severity Not Documented  HYDROcodone- Altered mental state.,    Allergies (1) Active Reaction  HYDROcodone altered mental state     Current medications:  (Selected)   Prescriptions  Prescribed  Ibrance 125 mg oral capsule: 125 mg = 1 cap(s), Oral, Daily, daily for 21 days, then off for 7 days, # 21 cap(s), 4 Refill(s), [facility]acy: [facility] [place] (prev. [facility])  Percocet 5/325 oral tablet: 1 tab(s), Oral, q6hr, PRN PRN for pain, # 120 tab(s), 0 Refill(s), Pharmacy: [facility]  Xanax 0.5 mg oral tablet: 0.5 mg = 1 tab(s), Oral, TID, # 90 tab(s), 0 Refill(s)  calcium (as carbonate)-vitamin D 500 mg-400 intl units oral tablet: 1 tab(s), Oral, BID, # 60 tab(s), 11 Refill(s), Pharmacy: [facility]  Documented Medications  Documented  Protonix 20 mg ORAL enteric coated tablet: 20 mg = 1 tab(s), Oral, Daily, 0 Refill(s)  Seroquel 50 mg oral tablet: 50 mg = 1 tab(s), Oral, At HS, 0 Refill(s)  amlodipine 5 mg oral tablet: 5 mg = 1 tab(s), Oral, Daily, # 30 tab(s), 0 Refill(s)  dexamethasone 1 mg oral tablet: 1 tab, Oral, Daily, 0 Refill(s)  haloperidol 1 mg oral tablet: 1 mg = 1 tab(s), Oral,  q4hr, PRN PRN nausea/vomiting, 0 Refill(s)  traMADol 50 mg oral tablet: 50 mg = 1 tab(s), Oral, q6hr, PRN PRN as needed for pain, 0 Refill(s)   Problem list:    All Problems  BMI 26.0-26.9,adult / SNOMED CT 5768295510 / Confirmed  Breast ca / SNOMED CT [id number] / Confirmed  Cancer, metastatic / SNOMED CT [id number] / Confirmed  Hypertension / SNOMED CT 01806696 / Confirmed  Liver cancer, primary, with metastasis from liver to other site / SNOMED CT [id number] / Confirmed  Obesity / SNOMED CT 0241039668 / Confirmed  Bone metastases / SNOMED CT 762018179 / Confirmed  Hepatic neoplasm secondary / SNOMED CT 569851011 / Confirmed  Terminal Illness / SNOMED CT 963057097 / Confirmed  Tobacco user / SNOMED CT 866591469 / Probable  Tobacco user / SNOMED CT 168274361 / Probable,    Active Problems (11)  BMI 26.0-26.9,adult   Bone metastases   Breast ca   Cancer, metastatic   Hepatic neoplasm secondary   Hypertension   Liver cancer, primary, with metastasis from liver to other site   Obesity   Terminal Illness   Tobacco user         Histories   Past Medical History:    Active  Cancer, metastatic ([id number])   Family History:    Mesothelioma  Father  Primary malignant neoplasm of breast  Other (Aunt)  Diabetes mellitus type 2  Mother  CAD (coronary artery disease)  Mother  High blood pressure                                                                                                                                                                                                                     09-AUG-2016 15:25:48<$>  Mother     Procedure history:    Mastectomy ([id number]) in the month of 1/2010 at 54 Years.  Comments:  7/3/2017 14:10 - Sherrell Hicks LPN  right  Mastectomy ([id number]).  Comments:  5/16/2017 08:02 - Paulina Smith LPN  left 8-2009, right removed 1-2010, then  reconstruction  .  Comments:  2017 08:03 - Paulina Smith LPN  10-  back surgery.  Comments:  2017 08:04 - Paulina Smith LPN    Appendectomy; ([id number]).   Social History        Social & Psychosocial Habits    Alcohol  04/15/2017  Use: Never    Substance Abuse  04/15/2017  Use: Never    Tobacco  2017  Use: Current every day smoker    Tobacco use per day: 10    Comment: started smokin cessation program thru [employer], medicaid ins. program - 2017 14:12 - Sherrell Hicks LPN  .        Physical Examination   Vital Signs   2018 9:45 CST       Temperature Oral          37.0 DegC                             Temperature Oral (calculated)             98.60 DegF                             Peripheral Pulse Rate     72 bpm                             Respiratory Rate          18 br/min                             SpO2                      98 %                             Systolic Blood Pressure   141 mmHg  HI                             Diastolic Blood Pressure  82 mmHg     General:  Alert and oriented, No acute distress.         Ambulation status: With steady gait.         Appearance: Well nourished.    Eye:  Pupils are equal, round and reactive to light, Extraocular movements are intact, wears glasses.    HENT:  Normocephalic, Normal hearing, Oral mucosa is moist.    Neck:  Supple, Non-tender, No lymphadenopathy.    Respiratory:  Lungs are clear to auscultation, Respirations are non-labored, Breath sounds are equal, Symmetrical chest wall expansion.    Cardiovascular:  Normal rate, Regular rhythm, No murmur, No gallop, Good pulses equal in all extremities, Normal peripheral perfusion, no LE edema. Mild lymphedema of left arm.    Gastrointestinal:  Soft, Non-tender, Non-distended, Normal bowel sounds, No organomegaly.    Musculoskeletal:  Normal range of motion, Normal strength, No tenderness, No deformity, Normal gait.    Integumentary:  Warm, Dry, Intact, No rash, left arm  lymphedema, mild.    Neurologic:  Alert, Oriented, Normal sensory, Normal motor function, No focal deficits, Cranial Nerves II-XII are grossly intact.    Cognition and Speech:  Oriented, Speech clear and coherent.    Psychiatric:  Cooperative, Appropriate mood & affect, Normal judgment, Non-suicidal.    Breast:  Deferred.    The National [employer] [place] Toxicity Scores:   Toxicity Grading   2/22/2018 10:23 CST      Education Note            Education Note  (Modified)   2/22/2018 10:23 CST      Oncology Patient Summary  Oncology Patient Summary    2/22/2018 9:45 CST       Weight Dosing             66.1 kg                             Weight Measured           66.1 kg                             Weight Measured and Calculated in Lbs     145.72 lb                             Height/Length Dosing      160 cm                             Height/Length Measured    160 cm                             BSA Measured              1.71 m2                             Body Mass Index Measured  25.82 kg/m2                             Temperature Oral          37.0 DegC                             Temperature Oral (calculated)             98.60 DegF                             Peripheral Pulse Rate     72 bpm                             Respiratory Rate          18 br/min                             SpO2                      98 %                             Systolic Blood Pressure   141 mmHg  HI                             Diastolic Blood Pressure  82 mmHg                             Pain Present              Yes actual or suspected pain                             Primary Pain Location     Hip                             Primary Pain Laterality   Bilateral                             Primary Pain Time Pattern Constant                             Primary Pain Quality      Aching                             Feeling Down, Depressed, Hopeless         Not at all                             Little Interest - Pleasure in Activities  Not at  all                             Initial Depression Screen Score           0                             ADLs                      Independent                             Living Situation          Home independently, Home with family care                             Chief Complaint           breast cancer                             Information Given by      Self                             Languages                 English                             Preferred Communication Mode              Verbal                             Influenza Vaccine Status  Yes                             Social History Grid Complete              Yes                             Advanced Directives       Yes                             Smoking Cessation Counseling              No                             Malnutrition Score        0                             Depression Screening Pos/Neg              Negative                             Cigarette Smoking Last 365 Days           Yes                             Adult Amb Care Intake and History - Text  Adult Ambulatory Care Intake and History    2/22/2018 9:12 CST       Sodium Lvl                142 mmol/L                             Potassium Lvl             3.7 mmol/L                             Chloride                  111 mmol/L  HI                             CO2                       26 mmol/L                             Calcium Lvl               9.3 mg/dL                             Glucose Lvl               106 mg/dL                             BUN                       7 mg/dL                             Creatinine                0.80 mg/dL                             eGFR-AA                   93 mL/min                             eGFR-FLORI                  77 mL/min  LOW                             Bili Total                0.4 mg/dL                             Bili Direct               0.1 mg/dL                             Bili Indirect             0.3 mg/dL                              AST                       10 unit/L  LOW                             ALT                       18 unit/L                             Alk Phos                  74 unit/L                             Total Protein             7.1 gm/dL                             Albumin Lvl               3.4 gm/dL                             Globulin                  3.70 gm/mL  HI                             A/G Ratio                 1 ratio                             WBC                       2.8 x10(3)/mcL  LOW                             RBC                       3.00 x10(6)/mcL  LOW                             Hgb                       12.3 gm/dL                             Hct                       34.7 %  LOW                             Platelet                  165 x10(3)/mcL                             MCV                       115.7 fL  HI                             MCH                       41.0 pg  HI                             MCHC                      35.4 gm/dL                             RDW                       13.0 %                             MPV                       9.2 fL                             Abs Neut                  1.66 x10(3)/mcL  LOW     ECOG Performance Scale: 1- Strenuous physical activity restricted; fully ambulatory and able to carry out light work.      Review / Management   Results review:  Lab results   2/22/2018 9:12 CST       Sodium Lvl                142 mmol/L                             Potassium Lvl             3.7 mmol/L                             Chloride                  111 mmol/L  HI                             CO2                       26 mmol/L                             Calcium Lvl               9.3 mg/dL                             Glucose Lvl               106 mg/dL                             BUN                       7 mg/dL                             Creatinine                0.80 mg/dL                             eGFR-AA                   93 mL/min                              eGFR-FLORI                  77 mL/min  LOW                             Bili Total                0.4 mg/dL                             Bili Direct               0.1 mg/dL                             Bili Indirect             0.3 mg/dL                             AST                       10 unit/L  LOW                             ALT                       18 unit/L                             Alk Phos                  74 unit/L                             Total Protein             7.1 gm/dL                             Albumin Lvl               3.4 gm/dL                             Globulin                  3.70 gm/mL  HI                             A/G Ratio                 1 ratio                             WBC                       2.8 x10(3)/mcL  LOW                             RBC                       3.00 x10(6)/mcL  LOW                             Hgb                       12.3 gm/dL                             Hct                       34.7 %  LOW                             Platelet                  165 x10(3)/mcL                             MCV                       115.7 fL  HI                             MCH                       41.0 pg  HI                             MCHC                      35.4 gm/dL                             RDW                       13.0 %                             MPV                       9.2 fL                             Abs Neut                  1.66 x10(3)/mcL  LOW  .         Interpretation: Consistent with previous results.       Impression and Plan     1. Metastatic Adenocarcinoma Breast with malignant pleural effusion, mets to liver, bones and peritoneum   -Faslodex started 06/07/2017.  Ibrance added 09/07/2017.  From extremely poor performance status, bedbound, extremely dramatic recovery with Faslodex.   -As per scans on 12/20/17, pt continues to have response to chemotherapy. Mets are either stable or decreased in size.    -At this time, doing  marvelously well.    2.  Osseous metastatic disease, now with upper and lower dentures    -Pain controlled with Percocet 5mg, refilled today      Plan:  Continue Faslodex and Ibrance   Proceed with Faslodex and Xgeva tomorrow.  Follow CA 27.29 and CA-15-3  Continue Xgeva  Calcium plus vitamin D twice a day  Follow up visit in 5 weeks with MD / resident for scan results and labs-->CBC/d, CMO, CA 27.29 and CA-15-3  Restaging CT scans in 3 months w/ NM Bone Scan, 3/21/18.

## 2022-04-30 NOTE — PROGRESS NOTES
Patient:   Elen Kidd             MRN: [id number]            FIN: [id number]               Age:   61 years     Sex:  Female     :  1955   Associated Diagnoses:   None   Author:   Angelita Aceves DO      Visit Information      No qualifying data available        Chief Complaint   Metastatic Adenocarcinoma Breast with malignant effusions, mets to liver, bones and peritoneaum.       Interval History   Patient is a 61-year-old  female new patient who presents with metastatic adenocarcinoma breast primary.  Patient was presented at tumor board on 17. 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  She underwent lumpectomy followed by chemotherapy with Taxotere/Cytoxan beginning on 10/1/09 and ending in 2010 at [facility] (patient reports 2 rounds of chemo), then Aromasin inhibitor for 1 month.  Patient did not continue this medication she says because of loss of insurance.  She presented to the ER on 4/15/17 with three-month history of left upper quadrant pain, nausea/vomiting. and progressive shortness of breath. Past medical history significant for heavy smoking.  CT imaging identified extensive metastatic disease with mediastinal, left internal mammary lymphadenopathy and extensive pleural metastatic deposits with large right and moderate left pleural effusions.  Also noted were numerous pulmonary nodules, innumerable hepatic metastases, multiple intra-abdominal and retroperitoneal implants, and extensive osseous metastasis.  Patient underwent a right thoracentesis on 17, which showed cytomorphology of adenocarcinoma, consistent with a breast primary. Today, patient's Sx include headache, nausea, vomiting, weakness, frequent falls, and increasing fatigue.  She reports that her left side feels weaker than her right.      Review of Systems   Constitutional:  Fever, Chills, Sweats, Weakness,  Fatigue.    Eye:  Recent visual problem.    Respiratory:  Shortness of breath, Cough.    Cardiovascular:  Chest pain, Palpitations, Peripheral edema, orthopnea.    Gastrointestinal:  Nausea, Vomiting, Constipation, Abdominal pain.    Musculoskeletal:  rib pain.    Integumentary:  Rash, itching.    Neurologic:  Abnormal balance, Confusion, Headache.    Psychiatric:  Anxiety, Depression.       Health Status   Allergies: Hydrocodone  Medications: Zofran, Percocet, docusate senna      Histories   Past Medical History:    No active or resolved past medical history items have been selected or recorded.   Family History:    Mesothelioma  Father  Primary malignant neoplasm of breast  Other  Diabetes mellitus type 2  Mother  CAD (coronary artery disease)  Mother  High blood pressure                                                                                                                                                                                                                     09-AUG-2016 15:25:48<$>  Mother     Procedure history:    Mastectomy ([id number]).  Comments:  2017 08:02 Paulina Wilson LPN  left -, right removed -, then reconstruction  .  Comments:  2017 08:03 Paulina Wilson LPN    back surgery.  Comments:  2017 08:04 Paulina Wilson LPN  1979   Social History        Social & Psychosocial Habits    Alcohol  04/15/2017  Use: Never    Substance Abuse  04/15/2017  Use: Never    Tobacco  04/15/2017  Use: Current every day smoker    Tobacco use per day: 10  .        Physical Examination      Vital Signs (last 24 hrs)_____  Last Charted___________  Temp Axillary     35.9 DegC  (MAY 16 07:55)  Heart Rate Peripheral   H 122bpm  (MAY 16 07:55)  Resp Rate         20 br/min  (MAY 16 07:55)  SBP      131 mmHg  (MAY 16 07:55)  DBP      87 mmHg  (MAY 16 07:55)  SpO2      94 %  (MAY 16 07:55)  Weight      59.5 kg  (MAY 16 07:55)  Height      160 cm  (MAY 16  07:55)  BMI      23.24  (MAY 16 07:55)     General:  Alert and oriented, No acute distress.    Eye:  Pupils are equal, round and reactive to light, Extraocular movements are intact, Normal conjunctiva.    HENT:  Normocephalic, Normal hearing, Oral mucosa is moist, No pharyngeal erythema.    Neck:  Supple, Non-tender, No lymphadenopathy, No thyromegaly.    Respiratory:  Respirations are non-labored, Breath sounds are equal, Symmetrical chest wall expansion, No chest wall tenderness, bibasilar crackles with decreased breath sounds lower half of both lung fields.    Cardiovascular:  Normal rate, Regular rhythm, No murmur, No gallop, Good pulses equal in all extremities, Normal peripheral perfusion, no LE edema. Mild lymphedema of left arm.    Gastrointestinal:  Soft, Non-tender, Non-distended, Normal bowel sounds, No organomegaly.    Musculoskeletal:  Normal range of motion, Normal strength, No tenderness, No deformity.    Integumentary:  Warm, Intact, rash on posterior right flank.    Neurologic:  Alert, Oriented, Normal sensory, Normal motor function, No focal deficits, Cranial Nerves II-XII are grossly intact, motor, sensory, and dtr equal bilaterally. .    Cognition and Speech:  Oriented, Speech clear and coherent.    Psychiatric:  Cooperative, Appropriate mood & affect.    Breast:  bilateral breast reconstruction .    ECOG Performance Scale: 2 - Capable of all self-care but unable to carry out any work activities. Up and about greater than 50 percent of waking hours.      Impression and Plan   Metastatic Adenocarcinoma Breast with malignant effusions, mets to liver, bones and peritoneaum.   -History of HER-2 negative, hormone receptor positive left breast invasive ductal carcinoma diagnosed in August 2009.  Underwent 2 rounds of chemo with Taxotere/Cytoxan.  One month of Aromasin.  Patient discontinued Aromasin secondary to cost.  -Plan is to follow-up CT scan with and without contrast of the brain and PET scan.   Follow-up within the week for initiation of chemo with weekly Taxol IV if no brain metastases present on scan.  If brain metastases are present, which according to patient's symptoms, there is concern for, we will hold off on chemo and begin steroids and radiation.  -Percocet, Zofran, docusate senna prescribed today.  -Family present at bedside.  Long discussion with patient and family about goals of treatment-being symptom control and prolongation of life, and side effects-including anemia and neuropathy as well as hair loss.  Patient and family voiced understanding of the plan.  Follow-up scans and return to clinic within the week.

## 2022-04-30 NOTE — H&P
"  * Final Report *  IR CLINIC VISIT     Patient:   Elen Kidd             MRN: [id number]            FIN: [id number]               Age:   61 years     Sex:  Female     :  1955   Associated Diagnoses:   Neoplasm of uncertain behavior of liver; Neoplasm Of Uncertain Behavior Of Trachea, Bronchus And Lung; Neoplasm of uncertain behavior of pleura; Shortness of breath   Author:   Abundio Ledesma MD      Preoperative Information   Indication for surgery:  Probable diffuse metastatic disease (skeletal, pleuropulmonary, hepatic, abdominal/retroperitoneal) from likely left breast carcinoma.         Associated symptoms: Shortness of breath.    Accompanied by:  Family member.    Source of history:  Self, Family member, Medical record.    Referral source:  Mary Kay PATIÑO, Addis MILAN, Venus PATIÑO, Abdoul LINDER.    History limitation:  None.       Chief Complaint   2017 14:38 CDT       Consultation for a biopsy of a retroperitoneal mass     Ms. Kidd was here, with her sister, Ms. Sherren, for interview and assessment prior requested CT-guided biopsy of an abdominal retroperitoneal mass suspected to be metastatic from old left breast carcinoma, referred by Dr. Muñiz, PGY-1/FM, as recommended by Dr. Donovan, Oncology.    Briefly, Ms. Kidd was diagnosed with invasive ductal carcinoma of the left breast, reported as Scarff-Rodriguez-Russell grade II/III, with positive surgical margins and lymphoproliferative invasion, in 2009. The tumor was ER/NE positive, but HER2 negative. Ms. Kidd was treated with bilateral mastectomy and implant reconstruction. Apparently patient was treated with a chemotherapy, but it is unclear whether she completed the treatment. Shortly after surgery, she lost her insurance and was also lost in follow up.    Today, Ms. Kidd stated that she presented to the ED/[facility] due to worsening nausea and vomiting, which she self diagnosed and treated as "gallbladder problems" (patient " "used to work as medical assistant in a private office, in [place]), for approximately 2 - 3 months prior. For a longer period of time, Ms. Kidd referred diffuse body aches, which were easily controlled with self prescribed ibuprofen. For the past 2 - 3 weeks, Ms. Kidd referred progressive shortness of breath, with current orthopnea, and she stated to be able to walk possibly 10 - 15 feet before having to stop gasping for air and nearly fainting. In the past 2 - 3 months patient stated to have lost >10 pounds, mainly because she cannot eat more than "3 - 4 bites" of anything before the nausea and vomiting set in.     Her past medical history is significant for heavy smoking; although she stated to have cut down, she continues to smoke about 10 cigarettes/day. Ms. Kidd had Cardoza bars placed for treatment of congenital thoracolumbar scoliosis. Her father had mesothelioma.         Review of Systems   Constitutional:  Weakness, Fatigue, Decreased activity.    Eye:  Negative.    Ear/Nose/Mouth/Throat:  Negative.    Respiratory:  Shortness of breath, Cough.    Cardiovascular:  Negative.    Gastrointestinal:  Nausea, Vomiting.    Genitourinary:  Negative.    Hematology/Lymphatics:  Negative.    Endocrine:  Negative.    Immunologic:  Negative.    Musculoskeletal:  Joint pain, Muscle pain, Claudication, Decreased range of motion.         Back pain: In the lower region, The pain is moderate.    Integumentary:  Negative.    Neurologic:  Alert and oriented X4.    Psychiatric:  Negative.    ROS reviewed as documented in chart      Health Status   Allergies:    Allergic Reactions (Selected)  No Known Medication Allergies,    Allergies (1) Active Reaction  No Known Medication Allergies None Documented     Current medications:  (Selected)   Prescriptions  Prescribed  Norco 7.5 mg-325 mg oral tablet: 1 tab(s), Oral, q4hr, PRN PRN for pain, # 40 tab(s), 0 Refill(s)  Phenergan 12.5 mg Tab: 12.5 mg = 1 tab(s), Oral, " q4hr, PRN PRN for nausea/vomiting, take alternate of Zofran, # 60 tab(s), 1 Refill(s)  Zofran 4 mg oral tablet: 4 mg = 1 tab(s), Oral, q4hr, # 20 tab(s), 2 Refill(s)  traMADol 50 mg oral tablet: 50 mg = 1 tab(s), Oral, q6hr, PRN PRN for pain, # 60 tab(s), 0 Refill(s),    Home Medications (4) Active  Norco 7.5 mg-325 mg oral tablet 1 tab(s), PRN, Oral, q4hr  Phenergan 12.5 mg Tab 12.5 mg = 1 tab(s), PRN, Oral, q4hr  traMADol 50 mg oral tablet 50 mg = 1 tab(s), PRN, Oral, q6hr  Zofran 4 mg oral tablet 4 mg = 1 tab(s), Oral, q4hr     Problem list:    All Problems  Tobacco user / SNOMED CT 240853712 / Probable  Breast ca / SNOMED CT [id number] / Confirmed,    Active Problems (2)  Breast ca   Tobacco user         Histories   Past Medical History:    No active or resolved past medical history items have been selected or recorded.   Family History:    Mesothelioma  Father  Diabetes mellitus type 2  Mother  CAD (coronary artery disease)  Mother  High blood pressure                                                                                                                                                                                                                     09-AUG-2016 15:25:48<$>  Mother     Procedure history:    Mastectomy ([id number]).   Social History        Social & Psychosocial Habits    Alcohol  04/15/2017  Use: Never    Substance Abuse  04/15/2017  Use: Never    Tobacco  04/15/2017  Use: Current every day smoker    Tobacco use per day: 10  .        Physical Examination   Vital Signs   5/1/2017 14:38 CDT       Peripheral Pulse Rate     111 bpm  HI                             Systolic Blood Pressure   144 mmHg  HI                             Diastolic Blood Pressure  100 mmHg  HI        Vital Signs (last 24 hrs)_____  Last Charted___________  Heart Rate Peripheral   H 111bpm  (MAY 01 14:38)  SBP      H 144mmHg  (MAY 01 14:38)  DBP      H 100mmHg  (MAY 01 14:38)      Measurements from flowsheet : Measurements   5/1/2017 14:38 CDT       Weight Dosing             61.7 kg                             Weight Measured and Calculated in Lbs     136.02 lb                             Height/Length Dosing      160 cm     General:  Alert and oriented, Mild distress.    Eye:  Normal conjunctiva.    HENT:  Normocephalic, Normal hearing.    Neck:  Supple, No jugular venous distention.    Respiratory:  Tachypneic, with very shallow breathing and some intercostal retraction.    Cardiovascular:  Tachycardic and hypertensive during intake.    Musculoskeletal:  Unsteady gait, supported by a cane.    Integumentary:  Warm, Dry.    Neurologic:  Alert, Oriented, No focal deficits.    Psychiatric:  Cooperative, Appropriate mood & affect, Normal judgment, Non-suicidal.       Review / Management   Radiology results   I reviewed and discussed with Ms. Kidd her CT of the chest, abdomen and pelvis, from April 15, 2017.    As known, there is likely diffuse metastatic disease affecting the liver, the abdomen/retroperitoneum, the axial skeleton (including the right iliac bone), as well as both pleural sacs, especially the right with larger pleural effusion and multiple pleural masses. Of significance, there seems to be local recurrence on the chest wall,  at the anatomic region of the left internal mammary chain. Multiple pulmonary masses and mediastinal adenopathies are also present.     Dr. Donovan requested to sample the soft tissues masses located between the inferior edge of the liver and the right kidney.   Documentation reviewed:          Case discussed with: I paged Dr. Muñiz, but she was unavailable.     Dr. CHUCK Reyes, PGY-1/FM, called back. I notified her that patient has no insurance and does not want to proceed with CT-guided biopsy of the abdominal/retroperitoneal masses as self payor. However, Ms. Kidd agreed to proceed with US-guided right thoracentesis, which was scheduled for  early tomorrow morning..         Calls & consults placed: I first called Ms. Og, from case management to discuss the potential CT guided biopsy. Ms. Og called back stating that patient is a self payor and has no insurance. Ms. Kidd stated that she applied for Medicare/Medicaid, but she has not received any response yet; patient refused to have the CT guided procedure because of this..       Impression and Plan   Diagnosis     Neoplasm of uncertain behavior of liver (CTU81-LX D37.6).     Neoplasm Of Uncertain Behavior Of Trachea, Bronchus And Lung (KXX52-PL D38.1).     Neoplasm of uncertain behavior of pleura (LFD34-BE D38.2).     Shortness of breath (WCW82-VY R06.02).     Condition:  Guarded.    I discussed in detail with Ms. Kidd the ultrasound guided right thoracentesis, which has the purpose of improving her breathing and potentially establish a diagnosis of malignancy. I explained to her that this would be performed tomorrow as outpatient, with her reclined on a table, in sterile fashion, local anesthesia and ultrasound guidance. I plan to drain as much fluid as possible. I also explained to her that this fluid might reaccumulate in a more or less rapid fashion. Ms. Kidd had no questions or concerns, but she voiced her understanding of the said explanations and agreed to proceed. Finally, Ms. Kidd voluntarily, freely, signed the informed consent form on her own accord, which was witnessed by her sister and by Ms. Ayaka Collazo MA/MARQUES.    Patient was scheduled for tomorrow at 0700 hours, in Radiology.      Professional Services   This was a 30 minutes interview, of which >50% of the time was devoted to  patient regarding the planned right thoracentesis.     Result type: Preoperative Note  Result date: May 01, 2017 15:35 CDT  Result status: Auth (Verified)  Result title: IR CLINIC VISIT  Performed by: Abundio Ledesma MD on May 01, 2017 16:30 CDT  Verified by: Abundio Ledesma MD  on May 01, 2017 16:30 CDT  Encounter info: [id number], [facility], Clinic Visit, 5/1/2017 - 5/1/2017    Doc has been moved by HIM specialist.

## 2022-05-02 NOTE — HISTORICAL [facility]ER
This is a historical note converted from Jeanne. Formatting and pictures may have been removed.  Please reference Jeanne for original formatting and attached multimedia. Chief Complaint  breast cancer, ecog 1, pain to hips and back 6/10  History of Present Illness  1. Recurrent metastatic breast cancer ? 4/15/2017?  2. Left breast cancer upper outer quadrant, diagnosed 8-  -Treatment: ?Taxotere/Cytoxan beginning on 10/1/09 and ending in January 2010 at [facility] [place] (patient reports 2 rounds of chemo)  -Aromasin inhibitor for 1 month. ?Patient did not continue this medication she says because of loss of insurance.?  ?  Current Treatment  Faslodex monthly started on 6-6-2017??  Ibrance 125 mg p.o. daily with food for 21 days followed by 7 days off, Started 9-  Xgeva subcu monthly, started 11/3/2017  ?  Treatment History?  Xeloda stopped on 6-8-17 2/2 painful mucositis.  Right thoracentesis on 5/2/17, which showed cytomorphology of adenocarcinoma, consistent with a breast primary.  ???  Clinical History  1.?Left breast cancer upper outer quadrant?ER 90%, IL 15%, Her 2 +1 by IHC, ki 67- 25%  ?Surgery: left lumpectomy 8/12/2009. ?Pathology: 1.2 cm invasive ductal ca, grade 2. ?Lymphovascular invasion, positive margin  Surgery: left mastectomy ?9/8/2009. ?Pathology: ?invasive ductal carcinoma largest tumor nodule 1.1, smaller nodule 0.5 cm. ?DCIS + high grade lymphatic invasion: NO. Perineural invasion: NO.?  Margin: Positive to ink margin of largest 0 of 21 nodes ER +/IL +, ? HER2 1+ by IHC. Stage: unknown. ?Treatment: Taxotere/Cytoxan beginning on 10/1/09 and ending in January 2010 at [facility] [place] ?(patient reports 2 rounds of chemo). ?Aromasin inhibitor for 1 month. ?Patient did not continue this medication she says because of loss of insurance.?  ?She presented to the ER on 4/15/17 with three-month history of left upper quadrant pain, nausea/vomiting and progressive shortness of breath. Past medical history significant  for heavy smoking.?  ?CT imaging identified extensive metastatic disease. She underwent a right thoracentesis on 5/2/17, which showed cytomorphology of adenocarcinoma, consistent with a breast primary.  ???  Recurrence:??CT chest/abd/pelvis 4/15/2017 -subcarinal node 2.1 x 3.0 cm, Right hilar lymph node 1.6 x 1.5 cm. Extensive left internal mammary lymphadenopathy ?3.6 x 4.4 cm enlarged internal mammary and left superior mediastinal lymph nodes as well. Right anterior mediastinal lymph nodes 1.0 x 1.5 cm. ?Right para-aortic lymph node ?1.2 x 2.2 cm. Multiple enlarged paracardiac/cardiophrenic lymph nodes 1.7 x 2.0 cm. Large right and moderate left pleural effusions. ?Multifocal right pleural soft tissue nodularity 2.4 x 1.7 cm. Numerous pulmonary nodules. Extensive osseous metastatic disease with involvement of the thoracic spine, sternum, and suspected involvement of several ribs. Innumerable hepatic metastatic lesions 4.0 x 3.6 cm and 3.1x 2.6 cm. Multiple small retroperitoneal nodes. Metastatic deposit in the retroperitoneum lateral to the right kidney 2.1 x 2.7 cm. Xeloda stopped on 6-8-17 2/2 painful mucositis. Started Faslodex on 06/07/2014, with dramatic turn around in her general health. ?  ?  9/07/2017?Started concurrent Ibrance 125mg po daily. ?With Faslodex, her general health has made a dramatic turnaround. ?From being bedbound until 2 or 3 months back, she became up and about, and bubbling with energy. ?Enjoying life. ?Enjoying her familys company as well as reunion. ?She mows her own glass in her backyard. ?Appetite is great. ?The only symptom she has is pain in hips and lower back, 4 out on a scale of 1-10, if 10 could be the worst. ?Takes tramadol only sparingly. ?Before starting treatment, she had lost 30 pounds in last 6 months, but says that she has regained 15 pounds back. ?Denies neurological symptoms chest pain shortness of breath abdominal pain nausea vomiting etc.  ?  Presents for a follow-up  visit. ?Extremely well. ?Bubbling with energy. ?Excellent appetite. ?No significant aches or pains. ?9/07/2017, CA 27.29 level was greater than 450. ?It was 4999 on 05/16/2017. CEA level 10.2. ?CBC unremarkable. ?CA 15-3 level elevated to 1836. ?Whole-body bone scan on 09/15/2017 showed diffuse hypermetabolic activity throughout the spine, bilateral ribs, pelvis, and proximal femora, possibly with some increased activity in the calvarium as well, all findings consistent with widespread skeletal metastasis. ?Follow-up CT scans of abdomen pelvis on 09/15/2017 showed a few scattered subcentimeter lung nodules in the right lung which were not seen previously but could have been masked secondary to large pleural effusion on previous exam; a small right pleural effusion; left lung nodules are not seen on the current exam; left pleural effusion has increased; widespread liver metastasis appears smaller; widespread skeletal metastases were again noted, worse at T6 and T7; stable mesenteric nodules.??  ?   ?  Interval History?  11/14/18:? Patient presents for scheduled follow-up visit.?At her last visit (held for 1 week secondary to chemotherapy-induced neutropenia. ANC recovered to 1400?and treatment was?resumed on 10/24/18. Today is her last day of this cycle.?She?is due to restart on Thursday of next week.?Her ANC today is 760.?She is asymptomatic and afebrile.?Her biggest complaints today are regarding weakness and fatigue. She reports prior to her last visit even, she began to feel increasingly weak.?She does report headaches?in the afternoons around the same time every day?in the frontal lobes.? Does not?require medication for?the headaches. Reports?they are short-lived.?States the headaches have been?ongoing for a couple of months?and changing in severity, stating?presently?are less severe. Also reporting some issues with balance; ambulating with cane steadily. Denies visual disturbances?or concerning?strokelike  symptoms.  ?   No intolerable treatment related side effects. Denies cough,?SOB,?sore throat, dysphasia?CP,?nausea, vomiting, diarrhea, abdominal pain. No fever or recent infection.  Review of Systems  A complete 12 point ROS was performed in full with pertinent positives as described in interval history. Remainder of the ROS done in full and are negative.  Physical Exam  Vitals & Measurements  T:?36.8? ?C (Oral)? HR:?91(Peripheral)? RR:?18? BP:?126/84? SpO2:?100%?  HT:?160?cm? HT:?160?cm? WT:?63.1?kg? WT:?63.1?kg? BMI:?24.65?  General:? Alert and oriented, No acute distress.  Eye:? Pupils are equal, round and reactive to light, Extraocular movements are intact.?  HENT:? Normocephalic, Normal hearing, Oral mucosa is moist, No pharyngeal erythema.?  Neck:? Supple, Non-tender, No lymphadenopathy.  Respiratory:? Respirations are non-labored, Symmetrical chest wall expansion, No chest wall tenderness.? No adventitious breath sounds  Cardiovascular:? Regular rhythm, No murmur, No gallop, Good pulses equal in all extremities, Normal peripheral perfusion.?  Gastrointestinal:? Soft, Non-tender, Non-distended, Normal bowel sounds, No organomegaly.?  Musculoskeletal:? Normal range of motion, Normal geriatric stiffness.?Ambulates with the assistance of cane. Strength 4/5 throughout.  Integumentary:? Warm, Dry, Pink, No rash.?  Neurologic:? Alert, Oriented, No focal deficits, Cranial Nerves II-XII are grossly intact.?  Cognition and Speech:? Speech clear and coherent, Functional cognition intact.?  Psychiatric:? Cooperative, Appropriate mood & affect, Normal judgment, Non-suicidal.?  ECOG: 3  Assessment/Plan  1. Metastatic Adenocarcinoma Breast with malignant pleural effusion, mets to liver, bones and peritoneum (4/2017)  -6/7/17: Faslodex started 06/07/2017. ?Ibrance added 09/07/2017.??  ->From extremely poor performance status, bedbound, experienced extremely dramatic recovery with Faslodex.  12/20/17, pt continues to have  response to chemotherapy. Mets are either stable or decreased in size.?  -3/23/18: Restaging scans ?Stable metastatic disease; CA 27.29 level continues to decline indicating response to treatment  -6/29/18:?CT C/A/P: No changes in hepatic and osseous metastatic disease since 3/23/18. Similar moderate left pleural effusion. 3 mm right upper lobe nodule is not definitively seen before, but is nonspecific. Suggest attention on follow-up studies. Unchanged extensive skeletal metastatic involvement.  09/04/2018: Nuclear medicine bone scan and restaging CT scans chest/abdomen/pelvis with contrast: Stable metastatic disease.  -CA 27.29 level consistently dropping  -09/12/2018: ECOG 3 overall, stable  -CA 27-29 pending today, has trended down to 90.6 at last draw.  ???  2. ?Osseous metastatic disease  -continue Tramadol as prescribed prn for pain  ???  3. Treatment-induced Neutropenia  -, asymptomatic, precautions discussed.???????????????  -Redraw labs next Friday, 11/23/18. Patient not willing to draw labs any sooner. Stating it took about 7 sticks to draw labs today.  ?   4. Headaches, weakness, ataxia  -CT Head without contrast (unable to obtain IV access)?today shows no intracranial abnormality or?no mass effect.  ???  Plan:  Current treatment: Faslodex and Ibrance as endocrine therapy for metastatic breast cancer; has had very good response, followed by a stable metastatic disease.  Proceed with Faslodex tomorrow.  Redraw labs next week to reevaluate ANC. If remains Grade III, will have to hold Ibrance one week and repeat labs. Once ANC returns to Grade I or II, ok to proceed with Ibrance.  Continue to follow CA 27.29 level monthly.  Continue Xgeva to prevent skeletal events from bone metastasis.  Calcium plus vitamin D twice a day with Xgeva  Restage with contrast enhanced CT scans of chest/abdomen/pelvis and whole-body nuclear medicine bone scan in 3 months--->scheduled 12/12/18. RTC with MD for results.  ?    Patient remains under the care of Hospice. We discussed her weakness?and and the fact that she is growing weary?of having lab work?due to?difficulty with?veins.?Reinforce that this is part of?monitoring counts while on lbrance. We discussed the last scans that showed stable disease. Respectfully and candidly?explained that she has the option to stop treatment or?have a break in therapy?with the understanding that without treatment the cancer will continue to spread. She verbalized understanding. ?   Problem List/Past Medical History  Ongoing  BMI 26.0-26.9,adult  Bone metastases  Breast ca  Cancer, metastatic  Hepatic neoplasm secondary  Hypertension  Liver cancer, primary, with metastasis from liver to other site  New onset of headaches  Obesity  Terminal Illness  Tobacco user  Unsteady gait  Historical  No qualifying data  Procedure/Surgical History  Mastectomy (2010)  Appendectomy;  back surgery    Mastectomy   Medications  amlodipine 5 mg oral tablet, 5 mg= 1 tab(s), Oral, Daily  calcium (as carbonate)-vitamin D 500 mg-400 intl units oral tablet, 1 tab(s), Oral, BID, 11 refills  dexamethasone 1 mg oral tablet, 1 tab, Oral, Daily  haloperidol 1 mg oral tablet, 1 mg= 1 tab(s), Oral, q4hr, PRN  Ibrance 125 mg oral capsule, 125 mg= 1 cap(s), Oral, Daily, 4 refills  PANTOPRAZOLE SODIUM 40 MG TBEC, 40 mg= 1 tab(s), Oral, Daily  Percocet 5/325 oral tablet, 1 tab(s), Oral, q6hr, PRN,? ?Not taking  Protonix 20 mg ORAL enteric coated tablet, 20 mg= 1 tab(s), Oral, Daily,? ?Not Taking per Prescriber  QUETIAPINE FUMARATE 25 MG TABS, 25 mg= 1 tab(s), Oral, qPM,? ?Not Taking per Prescriber  SENNA 8.6 MG TABS, 17.2 mg= 2 tab(s), Oral, Daily  Seroquel 50 mg oral tablet, 50 mg= 1 tab(s), Oral  traMADol 50 mg oral tablet, 50 mg= 1 tab(s), Oral, q6hr, PRN,? ?Not taking  Xanax 0.5 mg oral tablet, 0.5 mg= 1 tab(s), Oral, TID  Allergies  HYDROcodone?(altered mental state)  Social History  Alcohol  Never,  04/15/2017  Employment/School  Retired, 05/03/2018  Home/Environment  Lives with Alone., 05/03/2018  Nutrition/Health  Regular, 05/03/2018  Substance Abuse  Never, 04/15/2017  Tobacco  Current every day smoker, No, 11/14/2018  Current every day smoker, 10 per day., 07/03/2017  Family History  CAD (coronary artery disease): Mother.  Diabetes mellitus type 2: Mother.  High blood pressure 09-AUG-2016 15:25:48<$>: Mother.  Mesothelioma: Father.  Primary malignant neoplasm of breast: Other.  Immunizations  Vaccine Date Status   influenza virus vaccine, inactivated 10/12/2018 Given   influenza virus vaccine, inactivated 10/23/2017 Given   Health Maintenance  Health Maintenance  ???Pending?(in the next year)  ??? ??Due?  ??? ? ? ?ADL Screening due??11/14/18??and every 1??year(s)  ??? ? ? ?Advance Directive due??11/14/18??and every 1??year(s)  ??? ? ? ?Alcohol Misuse Screening due??11/14/18??and every 1??year(s)  ??? ? ? ?Aspirin Therapy for CVD Prevention due??11/14/18??and every 1??year(s)  ??? ? ? ?Bone Density Screening due??11/14/18??Variable frequency  ??? ? ? ?Breast Cancer Screening ([facility]) due??11/14/18??and every?  ??? ? ? ?Cognitive Screening due??11/14/18??and every 1??year(s)  ??? ? ? ?Colorectal Screening (Senior Wellness) due??11/14/18??and every?  ??? ? ? ?Functional Assessment due??11/14/18??and every 1??year(s)  ??? ? ? ?Geriatric Depression Screening due??11/14/18??and every 1??year(s)  ??? ? ? ?Hypertension Management-Education due??11/14/18??and every 1??year(s)  ??? ? ? ?Lung Cancer Screening due??11/14/18??and every 1??year(s)  ??? ? ? ?Pneumococcal Vaccine due??11/14/18??Variable frequency  ??? ? ? ?Pneumococcal Vaccine due??11/14/18??and every?  ??? ? ? ?Tetanus Vaccine due??11/14/18??and every 10??year(s)  ??? ? ? ?Zoster Vaccine due??11/14/18??and every 100??year(s)  ??? ??Due In Future?  ??? ? ? ?Smoking Cessation not due until??09/12/19??and every 1??year(s)  ??? ? ? ?Hypertension  Management-BMP not due until??10/23/19??and every 1??year(s)  ???Satisfied?(in the past 1 year)  ??? ??Satisfied?  ??? ? ? ?Blood Pressure Screening on??11/14/18.??Satisfied by Mariaa Meraz RN  ??? ? ? ?Body Mass Index Check on??11/14/18.??Satisfied by Mariaa Meraz RN  ??? ? ? ?Depression Screening on??11/14/18.??Satisfied by Mariaa Meraz RN  ??? ? ? ?Diabetes Screening on??11/14/18.??Satisfied by Cory Ernst Jr.  ??? ? ? ?Fall Risk Assessment on??11/14/18.??Satisfied by Mariaa Meraz RN  ??? ? ? ?Hypertension Management-Blood Pressure on??11/14/18.??Satisfied by Mariaa Meraz RN  ??? ? ? ?Influenza Vaccine on??10/12/18.??Satisfied by Mireille Langston LPN  ??? ? ? ?Obesity Screening on??11/14/18.??Satisfied by Mariaa Meraz RN  ??? ? ? ?Smoking Cessation on??09/12/18.??Satisfied by Mireille Langston LPN  ?  ?  Lab Results  Test Name Test Result Date/Time   Potassium Lvl 4.2 mmol/L 11/14/2018 08:16 CST   Chloride 103 mmol/L 11/14/2018 08:16 CST   CO2 26 mmol/L 11/14/2018 08:16 CST   Calcium Lvl 9.6 mg/dL 11/14/2018 08:16 CST   Glucose Lvl 107 mg/dL (High) 11/14/2018 08:16 CST   BUN 5 mg/dL (Low) 11/14/2018 08:16 CST   Creatinine 0.90 mg/dL 11/14/2018 08:16 CST   eGFR-AA 81 mL/min (Low) 11/14/2018 08:16 CST   eGFR-FLORI 67 mL/min (Low) 11/14/2018 08:16 CST   Bili Total 0.5 mg/dL 11/14/2018 08:16 CST   AST 20 unit/L 11/14/2018 08:16 CST   ALT 19 unit/L 11/14/2018 08:16 CST   Alk Phos 107 unit/L 11/14/2018 08:16 CST   Albumin Lvl 3.6 gm/dL 11/14/2018 08:16 CST   WBC 1.9 x10(3)/mcL (Low) 11/14/2018 08:16 CST   Hgb 13.8 gm/dL 11/14/2018 08:16 CST   Hct 41.1 % 11/14/2018 08:16 CST   Platelet 117 x10(3)/mcL (Low) 11/14/2018 08:16 CST   .3 fL (High) 11/14/2018 08:16 CST   Neutro Auto 40 x10(3)/mcL 11/14/2018 08:16 CST   Mono Auto 5 % 11/14/2018 08:16 CST   Diagnostic Results  (11/14/2018 12:02 CST CT Head W W/O Contrast)  FINDINGS: No intracranial hemorrhage or mass effect. The  ventricles  are stable caliber. Visualized sinuses are well aerated. No atypical  bony finding.  ?  IMPRESSION: Stable head CT with no acute or detrimental change  ? [1]     [1]?CT Head W W/O Contrast; Ervin PATIÑO, Tamela King 11/14/2018 12:02 CST

## 2022-05-02 NOTE — HISTORICAL [facility] CERNER
This is a historical note converted from Jeanne. Formatting and pictures may have been removed.  Please reference Jeanne for original formatting and attached multimedia. Chief Complaint  brreast cancer  History of Present Illness  Problem List:  1. Recurrent metastatic breast cancer ? 4/15/2017?  2. Left breast cancer upper outer quadrant, diagnosed 8-  -Treatment: ?Taxotere[place]toxan [place] on 10/1/09 [place] [facility] [place] 2 [place] of [place]  -Aromasin inhibitor for 1 month. ?Patient did not continue this medication she says because of loss of insurance.?  ?  Current Treatment:  Faslodex monthly. Started on 6-6-2017. Next dose due 7/17/19.??  Ibrance 125 mg p.o. daily with food for 21 days followed by 7 days off. Started 9-. Dose reduced to 100mg on 6/23/19. Next cycle starts 7/21/19.  Xgeva subcu monthly. 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  ?  Treatment History:?  Xeloda stopped on 6-8-17 2/2 painful mucositis.  Right thoracentesis on 5/2/17, which showed cytomorphology of adenocarcinoma, consistent with a breast primary.  ????  Clinical History  1.?Left breast cancer upper outer quadrant?ER 90%, CO 15%, Her 2 +1 by IHC, ki 67- 25%  ?Surgery: left lumpectomy 8/12/2009. ?Pathology: 1.2 cm invasive ductal ca, grade 2. ?Lymphovascular invasion, positive margin  Surgery: left mastectomy ?9/8/2009. ?Pathology: ?invasive ductal carcinoma largest tumor nodule 1.1, smaller nodule 0.5 cm. ?DCIS + high grade lymphatic invasion: NO. Perineural invasion: NO.?  Margin: Positive to ink margin of largest 0 of 21 nodes ER +/CO +, ? HER2 1+ by IHC. Stage: unknown. ?Treatment: Taxotere/Cytoxan beginning on 10/1/09 [place] [facility] [place] reports 2 rounds of chemo). ?Aromasin inhibitor for 1 month. ?Patient did not continue this medication she says because of loss of insurance.?  ?She presented to the ER on 4/15/17 with three-month history of  left upper quadrant pain, nausea/vomiting and progressive shortness of breath. Past medical history significant for heavy smoking.?  ?CT imaging identified extensive metastatic disease. She underwent a right thoracentesis on 5/2/17, which showed cytomorphology of adenocarcinoma, consistent with a breast primary.  ????  Recurrence:??CT chest/abd/pelvis 4/15/2017 -subcarinal node 2.1 x 3.0 cm, Right hilar lymph node 1.6 x 1.5 cm. Extensive left internal mammary lymphadenopathy ?3.6 x 4.4 cm enlarged internal mammary and left superior mediastinal lymph nodes as well. Right anterior mediastinal lymph nodes 1.0 x 1.5 cm. ?Right para-aortic lymph node ?1.2 x 2.2 cm. Multiple enlarged paracardiac/cardiophrenic lymph nodes 1.7 x 2.0 cm. Large right and moderate left pleural effusions. ?Multifocal right pleural soft tissue nodularity 2.4 x 1.7 cm. Numerous pulmonary nodules. Extensive osseous metastatic disease with involvement of the thoracic spine, sternum, and suspected involvement of several ribs. Innumerable hepatic metastatic lesions 4.0 x 3.6 cm and 3.1x 2.6 cm. Multiple small retroperitoneal nodes. Metastatic deposit in the retroperitoneum lateral to the right kidney 2.1 x 2.7 cm. Xeloda stopped on 6-8-17 2/2 painful mucositis. Started Faslodex on 06/07/2014, with dramatic turn around in her general health. ?  ?  9/07/2017?Started concurrent Ibrance 125mg po daily. ?With Faslodex, her general health has made a dramatic turnaround. ?From being bedbound until 2 or 3 months back, she became up and about, and bubbling with energy. ?Enjoying life. ?Enjoying her familys company as well as reunion. ?She mows her own glass in her backyard. ?Appetite is great. ?The only symptom she has is pain in hips and lower back, 4 out on a scale of 1-10, if 10 could be the worst. ?Takes tramadol only sparingly. ?Before starting treatment, she had lost 30 pounds in last 6 months, but says that she has regained 15 pounds back. ?Denies  neurological symptoms chest pain shortness of breath abdominal pain nausea vomiting etc.  ?  Presents for a follow-up visit. ?Extremely well. ?Bubbling with energy. ?Excellent appetite. ?No significant aches or pains. ?9/07/2017, CA 27.29 level was greater than 450. ?It was 4999 on 05/16/2017. CEA level 10.2. ?CBC unremarkable. ?CA 15-3 level elevated to 1836. ?Whole-body bone scan on 09/15/2017 showed diffuse hypermetabolic activity throughout the spine, bilateral ribs, pelvis, and proximal femora, possibly with some increased activity in the calvarium as well, all findings consistent with widespread skeletal metastasis. ?Follow-up CT scans of abdomen pelvis on 09/15/2017 showed a few scattered subcentimeter lung nodules in the right lung which were not seen previously but could have been masked secondary to large pleural effusion on previous exam; a small right pleural effusion; left lung nodules are not seen on the current exam; left pleural effusion has increased; widespread liver metastasis appears smaller; widespread skeletal metastases were again noted, worse at T6 and T7; stable mesenteric nodules.??  ?   12/17/2018:  -11/14/2018: CT head with and without contrast (headaches): No intracranial metastasis.  -12/12/2018: Restaging whole-body nuclear medicine bone scan: No metastatic disease.  -12/20/2018: Restaging CT C/A/P with contrast: Unchanged left pleural effusion volume in left lower lung lobe considerable atelectasis; no lung metastasis or thoracic adenopathy; stable hepatic metastatic involvement; extensive skeletal metastatic involvement, unchanged.  -12/13/2018: CA 27.29 level 90.1, continues to decline (was 428 in 01/2018).? CMP unremarkable.? CBC unremarkable.? Hemoglobin 12.5 g%.? Has been variably neutropenic, mostly in the mild range, for last many months, intermittently, as side effect of Ibrance.  Presents for follow-up visit, accompanied by a female family member.? Always  fatigued?but?functional status varies between ECOG 1 and ECOG 2.? No consistent decline. ?Appetite is fair.? No?fevers, chills, chest pain, cough, dyspnea,?unusual headaches, focal neurological symptoms, abdominal pain, nausea, vomiting, GI bleeding, etc.  ?   06/18/2019:  -Admitted [facility] 06/10/2019-06/14/2019.? Sore throat for 1 month.? Admitted with weakness, fatigue.? Enlarged left submandibular lymph nodes, tender to palpation.? Sore throat with difficulty swallowing.? Temperature 38.2.? Heart rate 120.? Mild cytopenias.? CTs showed right middle and lower lobe PE.? Started on Lovenox, then transitioned to Coumadin.? Vancomycin plus Zosyn for sepsis.? Cultures negative.? Patient improved.? Never complained of chest pain or dyspnea.? [facility].  -06/10/2019: CT chest with contrast: Right middle and lower lobe pulmonary emboli; continued bilateral pleural effusions; widespread bone metastasis with similar overall appearance since 12/12/2018.  06/10/2019: CT soft tissues of the neck with contrast: No cervical lymphadenopathy; diffuse bone metastasis.  -06/11/2019: Echocardiogram: LVEF >65%; normal RV  -Ibrance was held on 06/10/2019 because of mild leukopenia with symptoms of pharyngitis and suspected sepsis.  06/14/2019: WBC 1.7.? ANC 0.62.? Hemoglobin 9.? Platelets 104,000/mm?.?  -06/17/2019: Restaging CTs C/A/P with contrast: Increasing left pleural effusion, moderate-sized, compared to 12/12/2018; persistent pulmonary arterial thromboembolus with diminishing burden; no obvious change in hepatic and diffuse osseous metastatic disease-06/17/2019: Restaging bone scan: Similar bone metastasis (widespread); more intense radiotracer uptake in mandible (this is compatible with her osteonecrosis of the jaw); increased uptake in the maxilla which could be related to periodontal disease (this is also secondary to osteonecrosis of the jaw).  ANC 0.51 on June 13; 0.49 on June 12; 0.77 on June 11; 1.26 on  Cassie 10; 1.56 on May 9.? Previously, her ANC dropped to 0.76 on 11/04/2018: 0.79 on 10/16/2018; 0.62 on 10/12/2018, etc.  INR 9.33 yesterday, on 06/17/2019, on Coumadin 5 mg daily?(being managed by home hospice).  Presents for follow-up visit, accompanied by female family member. ?Not in any acute distress.? Generally weak but ECOG 2.? No significant chest pain or dyspnea. ?Extensively bruised, especially on upper extremities, but this is chronic. ?Absolutely denies bleeding in any form?like epistaxis, melena, hematochezia, hematemesis,?hemoptysis, bleeding from the gums, etc.? No fevers or chills. ?No unusual headaches or focal?neurological symptoms. ?Appetite is fair.  ?   Interval History?  8/13/19: Patient presents today for follow up on Ibrance/Faslodex; she took her last Ibrance dose last night and is scheduled to start her next Ibrance cycle?on 8/19/19. She is due for Faslodex tomorrow. She denies nausea, vomiting, diarrhea, constipation, mouth sores, abdominal pain. CMP is stable except slightly low eGFR at 67, likely secondary to decreased fluid intake prior to having blood drawn.?WBC 2.2; RBC 3.47; H&H 12.8 & 38.8; plts 187k; ANC 1380. She states she feels good. Will have her follow up in 4 weeks with labs prior to starting her next cycle. Will have her follow up in 8 weeks with Dr. Amos to review CT scan results. She is amenable to this plan.  Review of Systems  A complete 12-point?ROS was performed in full with pertinent positives as described in interval history. Remainder of ROS done in full and are negative.  Physical Exam  Vitals & Measurements  T:?37.1? ?C (Oral)? HR:?74(Peripheral)? RR:?18? BP:?126/79?  HT:?160?cm? WT:?65.8?kg? BMI:?25.7?  General: ?Alert and oriented, No acute distress.??  Appearance: Well-groomed  HEENT: ?Normocephalic, Oral mucosa is moist.?Pupils are equal, round and reactive to light, Extraocular movements are intact, Normal conjunctiva.?Dentures.  Neck: ?Supple,  Non-tender, No lymphadenopathy, No thyromegaly.??  Respiratory: ?Lungs are clear to auscultation, Respirations are non-labored, Breath sounds are equal, Symmetrical chest wall expansion.??  Cardiovascular: ?Normal rate, Regular rhythm, No edema.??  Breast:??Breast exam not performed on todays visit.??  Gastrointestinal: ?Rounded, Soft, Non-tender, Non-distended, Normal bowel sounds.??  Musculoskeletal: ?Normal strength.??  Integumentary: ?Warm, dry, intact.??  Neurologic: ?Alert, Oriented, No focal deficits, Cranial Nerves II-XII are grossly intact.??  Cognition and Speech: ?Oriented, Speech clear and coherent.??  Psychiatric: ?Cooperative, Appropriate mood & affect. ?  ECOG Performance Scale: 2 - Capable of all self-care but unable to carry out any work activities. Up and about greater than 50 percent of waking hours.?  Assessment/Plan  1.?Breast ca?C50.412  ?Assessment:  ?1. Left breast cancer.? Diagnosed 08/21/2009.?ER positive.? GA positive.? HER-2 negative.? Lumpectomy 08/12/2009.? Mastectomy 09/08/2009 (1.1 cm IDC; high-grade; lymphatic invasion; margin positive). ?Adjuvant?chemotherapy with TC x2 (10/01/2009-01/2010).? Aromatase inhibitor only for 1 month (then, lost her insurance).  ?--> Metastatic Adenocarcinoma Breast with malignant pleural effusion, mets to liver, bones and peritoneum (4/2017)  -6/7/17: Faslodex started 06/07/2017. ?Ibrance added 09/07/2017.??  ?->From?bedbound, extremely poor performance status,?experienced extremely dramatic recovery with Faslodex?plus Ibrance.  -12/20/17, pt continues to have response to chemotherapy. Mets are either stable or decreased in size.?  -3/23/18: Restaging scans ?Stable metastatic disease; CA 27.29 level continues to decline indicating response to treatment  -6/29/18:?CT C/A/P: Unchanged metastasis  -09/04/2018: Nuclear medicine bone scan and restaging CT scans chest/abdomen/pelvis with contrast: Stable metastatic disease.  -CA 27.29 level consistently  dropping  -09/12/2018: ECOG 3 overall, stable  -11/14/2018:?CT head with and without contrast (headaches): No intracranial metastasis  -12/12/2018: Restaging scans:?Stable metastatic disease;?unchanged?left pleural effusion  CA 27.29 level 90.1?on 12/13/2018,?consistently dropping?(was 428?in 01/2018)  -06/10/2019: CT chest with contrast: Right middle and lower lobe pulmonary emboli; continued bilateral pleural effusions; widespread bone metastasis with similar overall appearance since 12/12/2018.  -06/10/2019: CT soft tissues of the neck with contrast: No cervical lymphadenopathy; diffuse bone metastasis.  -06/17/2019:?Restaging CTs C/A/P with contrast?and bone scan: Stable metastatic disease  ???????  ?2.?Headaches, weakness, ataxia  -CT Head without contrast (unable to obtain IV access)?shows no intracranial abnormality or?no mass effect.  ?   3. ?Pulmonary embolism  -Incidentally noted?right middle and lower lobe artery emboli?on chest CT 06/10/2019?(no RV strain; echocardiogram normal)  ?   ???????  Plan:  Continue Faslodex plus Ibrance?without change?(except?decrease Ibrance dose from 125 mg daily to 100 mg daily in view of recurrent?neutropenia).  ?   In view of recurrent neutropenia?(mostly, grade 3),?it will help?to decrease the dose?of Ibrance to 100 mg p.o. daily?(daily for 3 weeks, then 1 week off)  ?   Current treatment: Faslodex and Ibrance as endocrine therapy for metastatic breast cancer; has had very good response, followed by a stable metastatic disease. Continue.  ?   Continue to follow CA 27.29 level monthly.  ?   Continue?indefinite anti-coagulation?for pulmonary emboli detected incidentally on chest CT dated 06/10/2019.  ?   Restage with contrast-enhanced CT scans of chest, abdomen, and pelvis,?and whole-body nuclear medicine bone scan in 3 months (mid September).  ?   Patient remains under the care of home hospice?to help her with her daily needs.  ?  Ok to proceed with next cycle to start  19.  Ibrance refill sent to patients pharmacy.  Follow up in 4 weeks on off week (between?9/10/19 & 19) with CBC, CMP, CA 27.29; earlier,?if any concerns.  Restaging scans scheduled for 19.  Follow up in 8 weeks with Dr. Amos with CBC, CMP, CA 27.29, and CT scan results.  Ordered:  ?  2.?Bone metastases?C79.51  -continue Tramadol as prescribed prn for pain  -2019:?Communication from a dentist:?Osteonecrosis of jaw; Xgeva?held?(she never informed us?of?continued jaw pain ever since extraction of teeth?1-1/2 years prior in 2017).??  ?   Xgeva held from 2019?for osteonecrosis of the jaw.  Patient follows up with Dr. Jean Claude Ley, dentist.  Ordered:  ?  3.?Chemotherapy induced neutropenia?D70.1  Ibrance induced neutropenia  -ANC 0.51 on ; 0.49 on ; 0.77 on ; 1.26 on Cassie 10; 1.56 on May 9.? Previously, her ANC dropped to 0.76 on 2018: 0.79 on 10/16/2018; 0.62 on 10/12/2018, etc.  -19 ANC: 940  -19 ANC 1380, grade 1 neutropenia.  ?   Problem List/Past Medical History  Ongoing  BMI 26.0-26.9,adult  Bone metastases  Breast ca  Cancer, metastatic  Chemotherapy induced neutropenia  Hepatic neoplasm secondary  Hypertension  Invasive ductal carcinoma of left breast  Liver cancer, primary, with metastasis from liver to other site  New onset of headaches  Obesity  Terminal Illness  Tobacco user  Tobacco user  Unsteady gait  Historical  No qualifying data  Procedure/Surgical History  Drainage of Right Pleural Cavity with Drainage Device, Percutaneous Approach (2017)  Thoracentesis, needle or catheter, aspiration of the pleural space; with imaging guidance (2017)  Mastectomy ()  Appendectomy;  back surgery    Mastectomy   Medications  amLODIPine 5 mg oral tablet, 5 mg= 1 tab(s), Oral, Daily  calcium (as carbonate)-vitamin D 500 mg-400 intl units oral tablet, 1 tab(s), Oral, BID, 11 refills  chlorhexidine 0.12% mucous  membrane liquid  haloperidol 2 mg/mL oral concentrate  Ibrance 100 mg oral capsule, 100 mg= 1 cap(s), Oral, Daily, 1 refills  PANTOPRAZOLE SODIUM 40 MG TBEC, 40 mg= 1 tab(s), Oral, Daily  polyethylene glycol 3350 oral powder for reconstitution  SENNA 8.6 MG TABS, 17.2 mg= 2 tab(s), Oral, Daily  Seroquel 50 mg oral tablet, 50 mg= 1 tab(s), Oral  traMADol 50 mg oral tablet, 50 mg= 1 tab(s), Oral, q6hr, PRN  Vitamin K 5 mg Oral Tab, 5 mg, Oral, Once  warfarin 5 mg oral tablet, 5 mg= 1 tab(s), Oral, Daily, 1 refills,? ?Not taking  Xanax 0.5 mg oral tablet, 0.5 mg= 1 tab(s), Oral, TID  Allergies  HYDROcodone?(altered mental state)  Social History  Abuse/Neglect  No, No, Yes, 08/13/2019  Alcohol  Past, 12/17/2018  Never, 04/15/2017  Employment/School  Retired, 12/17/2018  Retired, 05/03/2018  Home/Environment  Lives with Alone., 12/17/2018  Lives with Alone., 05/03/2018  Nutrition/Health  Regular, 05/03/2018  Sexual  Sexual orientation: Straight or heterosexual. Gender Identity Identifies as female., 03/12/2019  Substance Use  Never, 12/17/2018  Never, 04/15/2017  Tobacco  10 or more cigarettes (1/2 pack or more)/day in last 30 days, No, Cigarettes, 10 per day. Household tobacco concerns: No. Smokeless Tobacco Use: Never., 08/13/2019  Family History  CAD (coronary artery disease): Mother.  Diabetes mellitus type 2: Mother.  High blood pressure 09-AUG-2016 15:25:48<$>: Mother.  Mesothelioma: Father.  Primary malignant neoplasm of breast: Other.  Immunizations  Vaccine Date Status   influenza virus vaccine, inactivated 10/12/2018 Given   influenza virus vaccine, inactivated 10/23/2017 Given   Health Maintenance  Health Maintenance  ???Pending?(in the next year)  ??? ??OverDue  ??? ? ? ?Advance Directive due??01/01/19??and every 1??year(s)  ??? ? ? ?Alcohol Misuse Screening due??01/01/19??and every 1??year(s)  ??? ? ? ?Cognitive Screening due??01/01/19??and every 1??year(s)  ??? ? ? ?Functional Assessment due??01/01/19??and  every 1??year(s)  ??? ? ? ?Geriatric Depression Screening due??01/01/19??and every 1??year(s)  ??? ? ? ?Smoking Cessation due??01/01/19??and every 1??year(s)  ??? ??Due?  ??? ? ? ?Aspirin Therapy for CVD Prevention due??08/13/19??and every 1??year(s)  ??? ? ? ?Bone Density Screening due??08/13/19??Variable frequency  ??? ? ? ?Breast Cancer Screening (Senior Wellness) due??08/13/19??and every?  ??? ? ? ?Colorectal Screening (Senior Wellness) due??08/13/19??and every?  ??? ? ? ?Hypertension Management-Education due??08/13/19??and every 1??year(s)  ??? ? ? ?Lung Cancer Screening due??08/13/19??and every 1??year(s)  ??? ? ? ?Pneumococcal Vaccine due??08/13/19??Variable frequency  ??? ? ? ?Pneumococcal Vaccine due??08/13/19??and every?  ??? ? ? ?Tetanus Vaccine due??08/13/19??and every 10??year(s)  ??? ? ? ?Zoster Vaccine due??08/13/19??and every 100??year(s)  ??? ??Due In Future?  ??? ? ? ?Fall Risk Assessment not due until??01/01/20??and every 1??year(s)  ??? ? ? ?Obesity Screening not due until??01/01/20??and every 1??year(s)  ??? ? ? ?ADL Screening not due until??06/12/20??and every 1??year(s)  ??? ? ? ?Hypertension Management-Blood Pressure not due until??08/12/20??and every 1??year(s)  ??? ? ? ?Hypertension Management-BMP not due until??08/12/20??and every 1??year(s)  ???Satisfied?(in the past 1 year)  ??? ??Satisfied?  ??? ? ? ?ADL Screening on??06/12/19.??Satisfied by Linh Younger RN  ??? ? ? ?Blood Pressure Screening on??08/13/19.??Satisfied by Mireille Langston LPN  ??? ? ? ?Body Mass Index Check on??08/13/19.??Satisfied by Mireille Langston LPN  ??? ? ? ?Depression Screening on??07/16/19.??Satisfied by Heath Mederos LPN  ??? ? ? ?Diabetes Screening on??08/13/19.??Satisfied by Amanda Ng  ??? ? ? ?Fall Risk Assessment on??07/17/19.??Satisfied by Nuris Fraser RN  ??? ? ? ?Hypertension Management-Blood Pressure on??08/13/19.??Satisfied by Mireille Langston LPN  ??? ? ? ?Influenza  Vaccine on??10/12/18.??Satisfied by Ewing Mireille FRANCISCO S  ??? ? ? ?Obesity Screening on??08/13/19.??Satisfied by Ewing Mireille FRANCISCO S  ??? ? ? ?Smoking Cessation on??09/12/18.??Satisfied by Ewing Mireille FRANCISCO S  ?  Lab Results  Test Name Test Result Date/Time   Sodium Lvl 138 mmol/L 08/13/2019 09:35 CDT   Potassium Lvl 3.6 mmol/L 08/13/2019 09:35 CDT   Chloride 107 mmol/L 08/13/2019 09:35 CDT   CO2 25 mmol/L 08/13/2019 09:35 CDT   Calcium Lvl 9.9 mg/dL 08/13/2019 09:35 CDT   Glucose Lvl 126 mg/dL (High) 08/13/2019 09:35 CDT   BUN 9 mg/dL 08/13/2019 09:35 CDT   Creatinine 0.90 mg/dL 08/13/2019 09:35 CDT   eGFR-AA 81 mL/min (Low) 08/13/2019 09:35 CDT   eGFR-FLORI 67 mL/min (Low) 08/13/2019 09:35 CDT   Bili Total 0.4 mg/dL 08/13/2019 09:35 CDT   AST 22 unit/L 08/13/2019 09:35 CDT   ALT 19 unit/L 08/13/2019 09:35 CDT   Alk Phos 93 unit/L 08/13/2019 09:35 CDT   Total Protein 7.8 gm/dL 08/13/2019 09:35 CDT   Albumin Lvl 3.1 gm/dL (Low) 08/13/2019 09:35 CDT   Globulin 4.70 gm/mL (High) 08/13/2019 09:35 CDT   A/G Ratio 0.7 ratio (Low) 08/13/2019 09:35 CDT   WBC 2.2 x10(3)/mcL (Low) 08/13/2019 09:35 CDT   RBC 3.47 x10(6)/mcL (Low) 08/13/2019 09:35 CDT   Hgb 12.8 gm/dL 08/13/2019 09:35 CDT   Hct 38.8 % 08/13/2019 09:35 CDT   Platelet 187 x10(3)/mcL 08/13/2019 09:35 CDT   .8 fL (High) 08/13/2019 09:35 CDT   MCH 36.9 pg (High) 08/13/2019 09:35 CDT   MCHC 33.0 gm/dL 08/13/2019 09:35 CDT   RDW 14.9 % (High) 08/13/2019 09:35 CDT   MPV 10.3 fL 08/13/2019 09:35 CDT   Abs Neut 1.38 x10(3)/mcL (Low) 08/13/2019 09:35 CDT

## 2022-05-02 NOTE — HISTORICAL [facility] CERNER
This is a historical note converted from Jeanne. Formatting and pictures may have been removed.  Please reference Jeanne for original formatting and attached multimedia. Chief Complaint  Breast CA (Left)  History of Present Illness  Problem List:  1. Recurrent metastatic breast cancer ? 4/15/2017?  2. Left breast cancer upper outer quadrant, diagnosed 8-  -Treatment: ?Taxotere/[place]toxan [place] on 10/1/09 [place] [facility] [place] 2 [place] of [place]  -Aromasin inhibitor for 1 month. ?Patient did not continue this medication she says because of loss of insurance.?  ?  Current Treatment:  Faslodex monthly. Started on 6-6-2017. Next dose due 9/11/19.??  Ibrance 125 mg p.o. daily with food for 21 days followed by 7 days off. Started 9-. Dose reduced to 100mg on 6/23/19. Next cycle starts 9/16/19.  Xgeva subcu monthly. 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  ?  Treatment History:?  Xeloda stopped on 6-8-17 2/2 painful mucositis.  Right thoracentesis on 5/2/17, which showed cytomorphology of adenocarcinoma, consistent with a breast primary.  ????  Clinical History  1.?Left breast cancer upper outer quadrant?ER 90%, NJ 15%, Her 2 +1 by IHC, ki 67- 25%  ?Surgery: left lumpectomy 8/12/2009. ?Pathology: 1.2 cm invasive ductal ca, grade 2. ?Lymphovascular invasion, positive margin  Surgery: left mastectomy ?9/8/2009. ?Pathology: ?invasive ductal carcinoma largest tumor nodule 1.1, smaller nodule 0.5 cm. ?DCIS + high grade lymphatic invasion: NO. Perineural invasion: NO.?  Margin: Positive to ink margin of largest 0 of 21 nodes ER +/NJ +, ? HER2 1+ by IHC. Stage: unknown. ?Treatment: Taxotere/Cytoxan beginning on 10/1/09 and [place] [facility] [place] reports 2 rounds of chemo). ?Aromasin inhibitor for 1 month. ?Patient did not continue this medication she says because of loss of insurance.?  ?She presented to the ER on 4/15/17 with three-month history  of left upper quadrant pain, nausea/vomiting and progressive shortness of breath. Past medical history significant for heavy smoking.?  ?CT imaging identified extensive metastatic disease. She underwent a right thoracentesis on 5/2/17, which showed cytomorphology of adenocarcinoma, consistent with a breast primary.  ????  Recurrence:??CT chest/abd/pelvis 4/15/2017 -subcarinal node 2.1 x 3.0 cm, Right hilar lymph node 1.6 x 1.5 cm. Extensive left internal mammary lymphadenopathy ?3.6 x 4.4 cm enlarged internal mammary and left superior mediastinal lymph nodes as well. Right anterior mediastinal lymph nodes 1.0 x 1.5 cm. ?Right para-aortic lymph node ?1.2 x 2.2 cm. Multiple enlarged paracardiac/cardiophrenic lymph nodes 1.7 x 2.0 cm. Large right and moderate left pleural effusions. ?Multifocal right pleural soft tissue nodularity 2.4 x 1.7 cm. Numerous pulmonary nodules. Extensive osseous metastatic disease with involvement of the thoracic spine, sternum, and suspected involvement of several ribs. Innumerable hepatic metastatic lesions 4.0 x 3.6 cm and 3.1x 2.6 cm. Multiple small retroperitoneal nodes. Metastatic deposit in the retroperitoneum lateral to the right kidney 2.1 x 2.7 cm. Xeloda stopped on 6-8-17 2/2 painful mucositis. Started Faslodex on 06/07/2014, with dramatic turn around in her general health. ?  ?  9/07/2017?Started concurrent Ibrance 125mg po daily. ?With Faslodex, her general health has made a dramatic turnaround. ?From being bedbound until 2 or 3 months back, she became up and about, and bubbling with energy. ?Enjoying life. ?Enjoying her familys company as well as reunion. ?She mows her own glass in her backyard. ?Appetite is great. ?The only symptom she has is pain in hips and lower back, 4 out on a scale of 1-10, if 10 could be the worst. ?Takes tramadol only sparingly. ?Before starting treatment, she had lost 30 pounds in last 6 months, but says that she has regained 15 pounds back. ?Denies  neurological symptoms chest pain shortness of breath abdominal pain nausea vomiting etc.  ?  Presents for a follow-up visit. ?Extremely well. ?Bubbling with energy. ?Excellent appetite. ?No significant aches or pains. ?9/07/2017, CA 27.29 level was greater than 450. ?It was 4999 on 05/16/2017. CEA level 10.2. ?CBC unremarkable. ?CA 15-3 level elevated to 1836. ?Whole-body bone scan on 09/15/2017 showed diffuse hypermetabolic activity throughout the spine, bilateral ribs, pelvis, and proximal femora, possibly with some increased activity in the calvarium as well, all findings consistent with widespread skeletal metastasis. ?Follow-up CT scans of abdomen pelvis on 09/15/2017 showed a few scattered subcentimeter lung nodules in the right lung which were not seen previously but could have been masked secondary to large pleural effusion on previous exam; a small right pleural effusion; left lung nodules are not seen on the current exam; left pleural effusion has increased; widespread liver metastasis appears smaller; widespread skeletal metastases were again noted, worse at T6 and T7; stable mesenteric nodules.??  ?   12/17/2018:  -11/14/2018: CT head with and without contrast (headaches): No intracranial metastasis.  -12/12/2018: Restaging whole-body nuclear medicine bone scan: No metastatic disease.  -12/20/2018: Restaging CT C/A/P with contrast: Unchanged left pleural effusion volume in left lower lung lobe considerable atelectasis; no lung metastasis or thoracic adenopathy; stable hepatic metastatic involvement; extensive skeletal metastatic involvement, unchanged.  -12/13/2018: CA 27.29 level 90.1, continues to decline (was 428 in 01/2018).? CMP unremarkable.? CBC unremarkable.? Hemoglobin 12.5 g%.? Has been variably neutropenic, mostly in the mild range, for last many months, intermittently, as side effect of Ibrance.  Presents for follow-up visit, accompanied by a female family member.? Always  fatigued?but?functional status varies between ECOG 1 and ECOG 2.? No consistent decline. ?Appetite is fair.? No?fevers, chills, chest pain, cough, dyspnea,?unusual headaches, focal neurological symptoms, abdominal pain, nausea, vomiting, GI bleeding, etc.  ?   06/18/2019:  -Admitted [facility] 06/10/2019-06/14/2019.? Sore throat for 1 month.? Admitted with weakness, fatigue.? Enlarged left submandibular lymph nodes, tender to palpation.? Sore throat with difficulty swallowing.? Temperature 38.2.? Heart rate 120.? Mild cytopenias.? CTs showed right middle and lower lobe PE.? Started on Lovenox, then transitioned to Coumadin.? Vancomycin plus Zosyn for sepsis.? Cultures negative.? Patient improved.? Never complained of chest pain or dyspnea.? [facility].  -06/10/2019: CT chest with contrast: Right middle and lower lobe pulmonary emboli; continued bilateral pleural effusions; widespread bone metastasis with similar overall appearance since 12/12/2018.  06/10/2019: CT soft tissues of the neck with contrast: No cervical lymphadenopathy; diffuse bone metastasis.  -06/11/2019: Echocardiogram: LVEF >65%; normal RV  -Ibrance was held on 06/10/2019 because of mild leukopenia with symptoms of pharyngitis and suspected sepsis.  06/14/2019: WBC 1.7.? ANC 0.62.? Hemoglobin 9.? Platelets 104,000/mm?.?  -06/17/2019: Restaging CTs C/A/P with contrast: Increasing left pleural effusion, moderate-sized, compared to 12/12/2018; persistent pulmonary arterial thromboembolus with diminishing burden; no obvious change in hepatic and diffuse osseous metastatic disease-06/17/2019: Restaging bone scan: Similar bone metastasis (widespread); more intense radiotracer uptake in mandible (this is compatible with her osteonecrosis of the jaw); increased uptake in the maxilla which could be related to periodontal disease (this is also secondary to osteonecrosis of the jaw).  ANC 0.51 on June 13; 0.49 on June 12; 0.77 on June 11; 1.26 on  Cassie 10; 1.56 on May 9.? Previously, her ANC dropped to 0.76 on 11/04/2018: 0.79 on 10/16/2018; 0.62 on 10/12/2018, etc.  INR 9.33 yesterday, on 06/17/2019, on Coumadin 5 mg daily?(being managed by home hospice).  Presents for follow-up visit, accompanied by female family member. ?Not in any acute distress.? Generally weak but ECOG 2.? No significant chest pain or dyspnea. ?Extensively bruised, especially on upper extremities, but this is chronic. ?Absolutely denies bleeding in any form?like epistaxis, melena, hematochezia, hematemesis,?hemoptysis, bleeding from the gums, etc.? No fevers or chills. ?No unusual headaches or focal?neurological symptoms. ?Appetite is fair.  ?   Interval History?  9/10/19: Patient presents today for follow up on Ibrance/Faslodex; she is scheduled to start her next Ibrance cycle?on 9/16/19. She complains of feeling tired and weakness unchanged. CMP stable except mild elevation of BUN; she states she had water and coffee prior to having blood drawn today. WBC 4.9; H&H 13.9 & 41.6; plts 193k; ANC 3380. She reports adherence to calcium + vitamin D supplementation. Will have her follow up in 4 weeks with Dr. Amos with CT scan results. She and her daughter mention that she was having scans done every 6 months. In December 2018, Dr. Amos stretched out her scans because of difficulty drawing her labs. She would like to resume this if possible. Will consult with Dr. Amos about this. Asked the daughter to follow up with him regarding this at their next appointment.  Review of Systems  A complete 12-point?ROS was performed in full with pertinent positives as described in interval history. Remainder of ROS done in full and are negative.  Physical Exam  Vitals & Measurements  T:?37.1? ?C (Oral)? HR:?77(Peripheral)? RR:?18? BP:?127/84? SpO2:?98%?  HT:?160?cm? WT:?65.9?kg? BMI:?25.74?  General: ?Alert and oriented, No acute distress.??  Appearance: Well-groomed; presents with walking  stick.  HEENT: ?Normocephalic, Oral mucosa is moist.?Pupils are equal, round and reactive to light, Extraocular movements are intact, Normal conjunctiva.?Dentures.  Neck: ?Supple, Non-tender, No lymphadenopathy, No thyromegaly.??  Respiratory: ?Lungs are clear to auscultation, Respirations are non-labored, Breath sounds are equal, Symmetrical chest wall expansion.??  Cardiovascular: ?Normal rate, Regular rhythm, No edema.??  Breast:??Breast exam not performed on todays visit.??  Gastrointestinal: Rounded,?Soft, Non-tender, Non-distended, Normal bowel sounds.??  Musculoskeletal: ?Normal strength.??  Integumentary: ?Warm, dry, intact.?Thin, flaking mildly.?  Neurologic: ?Alert, Oriented, No focal deficits, Cranial Nerves II-XII are grossly intact.??  Cognition and Speech: ?Oriented, Speech clear and coherent.??  Psychiatric: ?Cooperative, Appropriate mood & affect. ?  ECOG Performance Scale: 2 - Capable of all self-care but unable to carry out any work activities. Up and about greater than 50 percent of waking hours.?  Assessment/Plan  1.?Metastatic breast cancer?C50.919  Assessment:  1. Left breast cancer.? Diagnosed 08/21/2009.?ER positive.? MD positive.? HER-2 negative.? Lumpectomy 08/12/2009.? Mastectomy 09/08/2009 (1.1 cm IDC; high-grade; lymphatic invasion; margin positive). ?Adjuvant?chemotherapy with TC x2 (10/01/2009-01/2010).? Aromatase inhibitor only for 1 month (then, lost her insurance).  --> Metastatic Adenocarcinoma Breast with malignant pleural effusion, mets to liver, bones and peritoneum (4/2017)  -6/7/17: Faslodex started 06/07/2017. ?Ibrance added 09/07/2017.??  -->From?bedbound, extremely poor performance status,?experienced extremely dramatic recovery with Faslodex?plus Ibrance.  -12/20/17, pt continues to have response to chemotherapy. Mets are either stable or decreased in size.?  -3/23/18: Restaging scans ?Stable metastatic disease; CA 27.29 level continues to decline indicating response to  treatment  -6/29/18:?CT C/A/P: Unchanged metastasis  -09/04/2018: Nuclear medicine bone scan and restaging CT scans chest/abdomen/pelvis with contrast: Stable metastatic disease.  -CA 27.29 level consistently dropping  -09/12/2018: ECOG 3 overall, stable  -11/14/2018:?CT head with and without contrast (headaches): No intracranial metastasis  -12/12/2018: Restaging scans:?Stable metastatic disease;?unchanged?left pleural effusion  CA 27.29 level 90.1?on 12/13/2018,?consistently dropping?(was 428?in 01/2018)  -06/10/2019: CT chest with contrast: Right middle and lower lobe pulmonary emboli; continued bilateral pleural effusions; widespread bone metastasis with similar overall appearance since 12/12/2018.  -06/10/2019: CT soft tissues of the neck with contrast: No cervical lymphadenopathy; diffuse bone metastasis.  -06/17/2019:?Restaging CTs C/A/P with contrast?and bone scan: Stable metastatic disease  ????????  2.?Headaches, weakness, ataxia  -CT Head without contrast (unable to obtain IV access)?shows no intracranial abnormality or?no mass effect.  ?   3. ?Pulmonary embolism  -Incidentally noted?right middle and lower lobe artery emboli?on chest CT 06/10/2019?(no RV strain; echocardiogram normal)  ?   ????????  Plan:  Continue Faslodex plus Ibrance?without change?(except?decrease Ibrance dose from 125 mg daily to 100 mg daily in view of recurrent?neutropenia).  ?   In view of recurrent neutropenia?(mostly, grade 3),?it will help?to decrease the dose?of Ibrance to 100 mg p.o. daily?(daily for 3 weeks, then 1 week off)  ?   Current treatment: Faslodex and Ibrance as endocrine therapy for metastatic breast cancer; has had very good response, followed by a stable metastatic disease. Continue.  ?   Continue to follow CA 27.29 level monthly.  ?   Continue?indefinite anti-coagulation?for pulmonary emboli detected incidentally on chest CT dated 06/10/2019.  ?   Restage with contrast-enhanced CT scans of chest, abdomen, and  pelvis,?and whole-body nuclear medicine bone scan in 3 months (mid September).  ?   Patient remains under the care of home hospice?to help her with her daily needs.  ?  Faslodex injection tomorrow.  Ok to proceed with next cycle to start 19.  Restaging scans scheduled for 19.  Follow up in 4 weeks on off week (between?10/7/19 & 10/13/19) with Dr. Amos with CBC, CMP, CA 27.29, and CT scan results; earlier,?if any concerns.  2.?Bone metastases?C79.51  -continue Tramadol as prescribed prn for pain  -2019:?Communication from a dentist:?Osteonecrosis of jaw; Xgeva?held?(she never informed us?of?continued jaw pain ever since extraction of teeth?1-1/2 years prior in 2017).??  ?   Xgeva held from 2019?for osteonecrosis of the jaw.  Patient follows up with Dr. Jean Claude Ley, dentist.   Problem List/Past Medical History  Ongoing  BMI 26.0-26.9,adult  Bone metastases  Breast ca  Cancer, metastatic  Chemotherapy induced neutropenia  Hepatic neoplasm secondary  Hypertension  Invasive ductal carcinoma of left breast  Liver cancer, primary, with metastasis from liver to other site  Metastatic breast cancer  New onset of headaches  Obesity  Terminal Illness  Tobacco user  Tobacco user  Unsteady gait  Historical  No qualifying data  Procedure/Surgical History  Drainage of Right Pleural Cavity with Drainage Device, Percutaneous Approach (2017)  Thoracentesis, needle or catheter, aspiration of the pleural space; with imaging guidance (2017)  Mastectomy ()  Appendectomy;  back surgery    Mastectomy   Medications  amLODIPine 5 mg oral tablet, 5 mg= 1 tab(s), Oral, Daily  calcium (as carbonate)-vitamin D 500 mg-400 intl units oral tablet, 1 tab(s), Oral, BID, 11 refills  chlorhexidine 0.12% mucous membrane liquid  haloperidol 2 mg/mL oral concentrate  Ibrance 100 mg oral capsule, 100 mg= 1 cap(s), Oral, Daily, 1 refills  PANTOPRAZOLE SODIUM 40 MG TBEC, 40 mg= 1  tab(s), Oral, Daily  polyethylene glycol 3350 oral powder for reconstitution  SENNA 8.6 MG TABS, 17.2 mg= 2 tab(s), Oral, Daily  Seroquel 50 mg oral tablet, 50 mg= 1 tab(s), Oral  traMADol 50 mg oral tablet, 50 mg= 1 tab(s), Oral, q6hr, PRN  Vitamin K 5 mg Oral Tab, 5 mg, Oral, Once  warfarin 5 mg oral tablet, 5 mg= 1 tab(s), Oral, Daily, 1 refills,? ?Not taking  Xanax 0.5 mg oral tablet, 0.5 mg= 1 tab(s), Oral, TID  Allergies  HYDROcodone?(altered mental state)  Social History  Abuse/Neglect  No, 09/10/2019  No, 08/14/2019  Alcohol  Past, 12/17/2018  Never, 04/15/2017  Employment/School  Retired, 12/17/2018  Retired, 05/03/2018  Home/Environment  Lives with Alone., 12/17/2018  Lives with Alone., 05/03/2018  Nutrition/Health  Regular, 05/03/2018  Sexual  Sexual orientation: Straight or heterosexual. Gender Identity Identifies as female., 03/12/2019  Substance Use  Never, 12/17/2018  Never, 04/15/2017  Tobacco  4 or less cigarettes(less than 1/4 pack)/day in last 30 days, No, 09/10/2019  10 or more cigarettes (1/2 pack or more)/day in last 30 days, No, 08/14/2019  Family History  CAD (coronary artery disease): Mother.  Diabetes mellitus type 2: Mother.  High blood pressure 09-AUG-2016 15:25:48<$>: Mother.  Mesothelioma: Father.  Primary malignant neoplasm of breast: Other.  Immunizations  Vaccine Date Status   influenza virus vaccine, inactivated 10/12/2018 Given   influenza virus vaccine, inactivated 10/23/2017 Given   Health Maintenance  Health Maintenance  ???Pending?(in the next year)  ??? ??OverDue  ??? ? ? ?Advance Directive due??01/01/19??and every 1??year(s)  ??? ? ? ?Alcohol Misuse Screening due??01/01/19??and every 1??year(s)  ??? ? ? ?Cognitive Screening due??01/01/19??and every 1??year(s)  ??? ? ? ?Functional Assessment due??01/01/19??and every 1??year(s)  ??? ? ? ?Geriatric Depression Screening due??01/01/19??and every 1??year(s)  ??? ? ? ?Smoking Cessation due??01/01/19??and every 1??year(s)  ???  ??Due?  ??? ? ? ?Aspirin Therapy for CVD Prevention due??09/10/19??and every 1??year(s)  ??? ? ? ?Bone Density Screening due??09/10/19??Variable frequency  ??? ? ? ?Breast Cancer Screening (Senior Wellness) due??09/10/19??and every?  ??? ? ? ?Colorectal Screening (Senior Wellness) due??09/10/19??and every?  ??? ? ? ?Hypertension Management-Education due??09/10/19??and every 1??year(s)  ??? ? ? ?Lung Cancer Screening due??09/10/19??and every 1??year(s)  ??? ? ? ?Pneumococcal Vaccine due??09/10/19??Variable frequency  ??? ? ? ?Pneumococcal Vaccine due??09/10/19??and every?  ??? ? ? ?Tetanus Vaccine due??09/10/19??and every 10??year(s)  ??? ? ? ?Zoster Vaccine due??09/10/19??and every 100??year(s)  ??? ??Due In Future?  ??? ? ? ?Fall Risk Assessment not due until??01/01/20??and every 1??year(s)  ??? ? ? ?Obesity Screening not due until??01/01/20??and every 1??year(s)  ??? ? ? ?ADL Screening not due until??06/12/20??and every 1??year(s)  ??? ? ? ?Hypertension Management-Blood Pressure not due until??09/09/20??and every 1??year(s)  ??? ? ? ?Hypertension Management-BMP not due until??09/09/20??and every 1??year(s)  ???Satisfied?(in the past 1 year)  ??? ??Satisfied?  ??? ? ? ?ADL Screening on??06/12/19.??Satisfied by Linh Younger RN  ??? ? ? ?Blood Pressure Screening on??09/10/19.??Satisfied by Patricia Stapleton LPN  ??? ? ? ?Body Mass Index Check on??09/10/19.??Satisfied by Patricia Stapleton LPN  ??? ? ? ?Depression Screening on??09/10/19.??Satisfied by Patricia Stapleton LPN  ??? ? ? ?Diabetes Screening on??09/10/19.??Satisfied by Kaelyn Copeland  ??? ? ? ?Fall Risk Assessment on??09/10/19.??Satisfied by Patricia Stapleton LPN  ??? ? ? ?Hypertension Management-Blood Pressure on??09/10/19.??Satisfied by Patricia Stapleton LPN  ??? ? ? ?Influenza Vaccine on??10/12/18.??Satisfied by Mireille Langston LPN  ??? ? ? ?Obesity Screening on??09/10/19.??Satisfied by Patricia Stapleton LPN  ??? ? ? ?Smoking Cessation  on??09/12/18.??Satisfied by Mireille Langston LPN  ?  Lab Results  Test Name Test Result Date/Time   Sodium Lvl 138 mmol/L 09/10/2019 09:14 CDT   Potassium Lvl 4.0 mmol/L 09/10/2019 09:14 CDT   Chloride 107 mmol/L 09/10/2019 09:14 CDT   CO2 24 mmol/L 09/10/2019 09:14 CDT   Calcium Lvl 9.8 mg/dL 09/10/2019 09:14 CDT   Glucose Lvl 146 mg/dL (High) 09/10/2019 09:14 CDT   BUN 24 mg/dL (High) 09/10/2019 09:14 CDT   Creatinine 0.80 mg/dL 09/10/2019 09:14 CDT   eGFR-AA 93 mL/min 09/10/2019 09:14 CDT   eGFR-FLORI 77 mL/min (Low) 09/10/2019 09:14 CDT   Bili Total 0.2 mg/dL 09/10/2019 09:14 CDT   Bili Direct 0.1 mg/dL 09/10/2019 09:14 CDT   Bili Indirect 0.1 mg/dL 09/10/2019 09:14 CDT   AST 13 unit/L (Low) 09/10/2019 09:14 CDT   ALT 24 unit/L 09/10/2019 09:14 CDT   Alk Phos 90 unit/L 09/10/2019 09:14 CDT   Total Protein 7.3 gm/dL 09/10/2019 09:14 CDT   Albumin Lvl 3.2 gm/dL (Low) 09/10/2019 09:14 CDT   Globulin 4.10 gm/mL (High) 09/10/2019 09:14 CDT   A/G Ratio 0.8 ratio (Low) 09/10/2019 09:14 CDT   WBC 4.9 x10(3)/mcL 09/10/2019 09:14 CDT   RBC 3.93 x10(6)/mcL (Low) 09/10/2019 09:14 CDT   Hgb 13.9 gm/dL 09/10/2019 09:14 CDT   Hct 41.6 % 09/10/2019 09:14 CDT   Platelet 193 x10(3)/mcL 09/10/2019 09:14 CDT   .9 fL (High) 09/10/2019 09:14 CDT   MCH 35.4 pg (High) 09/10/2019 09:14 CDT   MCHC 33.4 gm/dL 09/10/2019 09:14 CDT   RDW 16.2 % (High) 09/10/2019 09:14 CDT   MPV 9.9 fL 09/10/2019 09:14 CDT   Abs Neut 3.38 x10(3)/mcL 09/10/2019 09:14 CDT   Neutro Auto 68 % 09/10/2019 09:14 CDT   Lymph Auto 27 % 09/10/2019 09:14 CDT   Mono Auto 3 % 09/10/2019 09:14 CDT   Eos Auto 0 % 09/10/2019 09:14 CDT   Abs Eos 0.0 x10(3)/mcL 09/10/2019 09:14 CDT   Abs Neutro 3.38 x10(3)/mcL 09/10/2019 09:14 CDT   Abs Lymph 1.4 x10(3)/mcL 09/10/2019 09:14 CDT   Abs Mono 0.2 x10(3)/mcL 09/10/2019 09:14 CDT   IG% 1 % 09/10/2019 09:14 CDT   IG# 0.0400 09/10/2019 09:14 CDT

## 2022-05-02 NOTE — HISTORICAL [facility] CERNER
This is a historical note converted from [employer]. Formatting and pictures may have been removed.  Please reference [employer] for original formatting and attached multimedia. History of Present Illness  ?  1. Recurrent metastatic breast cancer ? 4/15/2017?  2. Left breast cancer upper outer quadrant, diagnosed 8-  -Treatment: ?Taxotere/Cytoxan beginning on 10/1/09 and ending in January 2010 at [facility] (patient reports 2 rounds of chemo)  -Aromasin inhibitor for 1 month. ?Patient did not continue this medication she says because of loss of insurance.?  ??  Treatment History?  Xeloda stopped on 6-8-17 2/2 painful mucositis.  Right thoracentesis on 5/2/17, which showed cytomorphology of adenocarcinoma, consistent with a breast primary.  ?   Reason for visit:  Follow-up for?recurrent metastatic breast cancer  ????  History of present illness:  For details, please see my last note dated 02/26/2020.? Please also refer to assessment and plan section.  ?   ?  Interval History?  ?   05/26/2020:  -03/20/2020: DEXA scan: Normal BMD  -Restaging CT C/A/P with contrast and bone scan were scheduled for 04/29/2020  -CA 27.29 level: Rising.? 496.6 (03/26/2020).? 272.1 (02/26/2020).? 219.8 (01/30/2020), etc.  -Intact PTH level 46.9, normal (02/26/2020)  -PTH related peptide less than 2.0 (02/26/2020)  -Calcium and vitamin D have been put on hold due to hypercalcemia  -05/15/2020: Restaging bone scan (comparison: 12/20/2019): Right scapular apex presumed metastasis; otherwise, widespread bone metastases are stable  05/15/2020: Restaging CTs C/A/P with contrast (comparison: 12/20/2019): Interval resolution of previously described right lower lobe lung nodules; otherwise, stable, with similar bone and liver metastasis  -CA 27.29 level is rising as of 03/26/2020 (496.6, up from 272.1 on 02/26/2020)  Presents for follow-up visit, accompanied by female family member.? In a wheelchair.? From what she describes, she is ECOG 2.? DEXA, goes to  the kitchen and makes her own coffee.? Walks around the house.? No fevers or chills. ?Compliant with Arimidex.? Calcium and vitamin D are on hold because of hypercalcemia.? No unusual headaches or focal neurological symptoms.? No chest pain or dyspnea with moderate physical activity.? No abdominal pain, nausea, or vomiting.? Lots of bone pains for which she is on tramadol and Tylenol No. 3; apparently,?her hospice physician discontinued Norco?secondary to side effects.? Her hospice physician is managing her pain.? Excellent appetite.  ?  ?  Review of Systems  A complete 12 point ROS was performed in full with pertinent positives as described in interval history. Remainder of the ROS done in full and are negative.  ?  ?   Physical Exam:  VITAL SIGNS: ?Reviewed. ? ?  GENERAL: ?In no apparent distress. ?  HEAD: ?No signs of head trauma.  EYES: ?Pupils are equal. ?Extraocular motions intact. ?  EARS: ?Hearing grossly intact.  MOUTH: ?Oropharynx is normal.?  NECK: ?No adenopathy, no JVD. ??  CHEST: ?Chest with clear breath sounds bilaterally. ?No wheezes, rales, or rhonchi. ?  CARDIAC: ?Regular rate and rhythm. ?S1 and S2, without murmurs, gallops, or rubs.  VASCULAR: ?No Edema. ?Peripheral pulses normal and equal in all extremities.  ABDOMEN: ?Soft, without detectable tenderness. ?No sign of distention. ?No ? rebound or guarding, and no masses palpated. ? Bowel Sounds normal.  MUSCULOSKELETAL: ?Good range of motion of all major joints. Extremities without clubbing, cyanosis or edema. ?  NEUROLOGIC EXAM: ?Alert and oriented x 3. ?No focal sensory or strength deficits. ? Speech normal. ?Follows commands.  PSYCHIATRIC: ?Mood normal.  SKIN: ?No rash or lesions.  ?  ?   Assessment/Plan:  To summarize:  #Left breast cancer.? Diagnosed 08/21/2009. ?ER positive. ?CT positive. ?HER-2 negative.  Apparently,?lab does not have enough tissue to run PIK3CA, NTRK gene fusion,?and MSI-H/dMMR?tests  Genetic testing negative  (01/06/2020)  Lumpectomy 08/12/2009).? Mastectomy (09/08/2009)?(1.1 cm tumor; high-grade;?lymphatic invasion; margin positive).  Adjuvant TC x2?(10/01/2009-01/2010)  Adjuvant?aromatase inhibitor?for only 1 month (lost medical insurance)  ?   >>>  04/2017: Metastatic disease:?Malignant pleural effusion, liver metastasis, bone metastasis, peritoneal metastasis  06/07/2017:?Faslodex started  09/07/2017:?Palbociclib added  Dramatic response?with endocrine therapy?(dramatic improvement in performance status)  ?  >>>  -12/20/2019:?Worsening of metastatic disease (scans, tumor marker)?(minimally worsened bone metastasis;?at least 8?new peripheral?noncalcified lung nodules right lower lung, 3-28 mm,?microlobulated and spiculated, suspicious for metastasis), etc.  -Arimidex (second line endocrine therapy) started 01/09/2020  -CA 27.29 level rising  -Stable/improved metastatic disease?(CTs, bone scan: 05/15/2020) despite rising CA 27.29 level  ?  #Sites of disease:  Left breast?(status post mastectomy);?malignant pleural effusion; liver metastasis; bone metastasis; peritoneal metastasis  ?  #Hypercalcemia  -iPTH,?PTH-RP?normal (02/2020)  ?  #Genetic testing negative (01/06/2020)  ?  #06/10/2019:?Incidentally detected PE  ?  #Osteonecrosis of jaw; Xgeva held from 03/20/2019  ?  ?  Plan:  -Stable/improving disease as of 05/15/2020  -05/26/2020: ECOG 2;?except for?bone pains from metastasis,?feels well, with voracious appetite  -Restage with contrast-enhanced CT C/A/P and bone scan in 3 months (mid August)  -Continue Arimidex (second line hormonal therapy)  -CA 27.29 level monthly  -DEXA scan 03/2022  -Calcium and vitamin D supplements on hold in view of hypercalcemia  -Xgeva/bisphosphonates contraindicated because of osteonecrosis of the jaw  -BRCA1/2 mutations negative  -Lab does not have adequate tissue for other molecular testing  -Indefinite anticoagulation (incidental PE: 06/10/2019)  -Remains under the care of home  hospice  ?  Follow-up visit with NP in 1 month, with CBC, CMP, and CA 27.29 level.  ?  Above discussed at length with her.? All questions answered.? Went over labs and scans and gave her copies for her record.? She understands and agrees with this plan.  ------------  ?  ?  As of 05/15/2020, stable/improving metastatic disease?despite rising CA 27.29 level  Continue Arimidex?1 mg p.o. daily?(second line hormone therapy, starting 01/09/2020)  Continue?endocrine therapy until progression or unacceptable?toxicity  Since disease has progressed while on?CDK 4/6 inhibitor therapy?(Ibrance), therefore, no indication of additional lines of therapy?with another CDK 4/6-containing regimen.  ?  If no clinical benefit after up to 3 sequential endocrine therapy regimens,?or if symptomatic visceral disease, then?chemotherapy.  ?  CA 27.29 level monthly?to monitor disease  ?  Normal BMD on DEXA scan (03/20/2020).  Repeat DEXA scan in 2 years (03/2022).  ?  Hypercalcemic with normal intact PTH level?and PTH related peptide (02/2020)  Cannot give Xgeva?or bisphosphonate?(osteonecrosis of the jaw).  Treated with IV fluids and?salmon calcitonin  On Arimidex  Given hypercalcemia, calcium and vitamins D supplements are on hold  ?  Advised her to to drink plenty of fluids?for hypercalcemia.  To report to emergency department?if she starts becoming confused.  ?  Genetic testing negative for BRCA1/2 mutation. ?Therefore,?not a candidate for treatment with?PARP inhibitors (olaparib, talazoparib).  Apparently,?lab does not have enough tissue to run PIK3CA, NTRK gene fusion,?and MSI-H/dMMR?tests.  ?  Continue?indefinite anticoagulation?for pulmonary emboli incidentally?detected?on chest CT dated 06/10/2019.  Reports no bleeding.  ?  Xgeva held from 03/20/2019?for osteonecrosis of the jaw.  Patient follows up with Dr. Jean Claude Ley, oral surgeon.  Apparently, because of her?being on chronic anticoagulation,?oral surgery was avoided. ?She  continues to complain of?pain when eating.  ?  Patient remains under the care of home hospice?to help her with her daily needs.?  Physical Exam  Vitals & Measurements  T:?37.9? ?C (Oral)? HR:?88(Peripheral)? RR:?18? BP:?137/84? SpO2:?95%?  HT:?160?cm? WT:?62.6?kg? BMI:?24.45?   Problem List/Past Medical History  Ongoing  Bone metastases  Hypercalcemia  Long term current use of aromatase inhibitor  Metastatic breast cancer  Pulmonary embolism  Historical  Breast ca  Cancer, metastatic  Hepatic neoplasm secondary  New onset of headaches  Obesity  Terminal Illness  Unsteady gait  Procedure/Surgical History  Drainage of Right Pleural Cavity with Drainage Device, Percutaneous Approach (2017)  Thoracentesis, needle or catheter, aspiration of the pleural space; with imaging guidance (2017)  Mastectomy ()  Appendectomy;  back surgery    Mastectomy   Medications  acetaminophen-codeine #4, Oral, q6hr  amLODIPine 5 mg oral tablet, 5 mg= 1 tab(s), Oral, Daily  Arimidex 1 mg oral tablet, 1 mg= 1 tab(s), Oral, Daily, 3 refills  dexamethasone 1 mg oral tablet, 1 mg= 1 tab(s), Oral, BID  haloperidol 1 mg oral tablet, 1 mg= 1 tab(s), Oral, TID  Ibrance 75 mg oral capsule, 75 mg= 1 cap(s), Oral, Daily,? ?Not Taking per Prescriber  LORazepam 1 mg oral tablet, 1 mg= 1 tab(s), Oral, TID  PANTOPRAZOLE SODIUM 40 MG TBEC, 40 mg= 1 tab(s), Oral, Daily  polyethylene glycol 3350 oral powder for reconstitution  potassium chloride 10 mEq oral TABLET extended release, 10 mEq= 1 tab(s), Oral, Daily  potassium chloride 20 mEq oral TABLET extended release, 20 mEq= 1 tab(s), Oral, BID,? ?Unable to obtain: needs refill  QUEtiapine 25 mg oral tablet, 25 mg= 1 tab(s), Oral, TID  SENNA 8.6 MG TABS, 17.2 mg= 2 tab(s), Oral, Daily  traMADol 50 mg oral tablet, 50 mg= 1 tab(s), Oral, q6hr, PRN  Xarelto 15mg Tablet, 15 mg= 1 tab(s), Oral, qPM  Zofran ODT 8 mg oral tablet, disintegrating, 8 mg= 1 tab(s), Oral, TID, PRN, 1  refills  Allergies  HYDROcodone?(altered mental state)  Social History  Abuse/Neglect  No, 03/26/2020  No, 01/30/2020  No, 01/09/2020  No, 01/03/2020  Alcohol  Past, 12/17/2018  Never, 04/15/2017  Employment/School  Retired, 05/03/2018  Exercise  Exercise frequency: Daily. Exercise type: Walking., 10/16/2019  Home/Environment  Lives with Children. Living situation: Home/Independent. Oxygen, Walker/Cane, Wheelchair, TV/Computer concerns: No. Single family house, Risks in environment: Pets/Animal exposure., 10/16/2019  Nutrition/Health  Regular, 05/03/2018  Sexual  Sexual orientation: Straight or heterosexual. Gender Identity Identifies as female., 03/12/2019  Spiritual/Cultural  [employer], 10/16/2019  Substance Use  Never, 04/15/2017  Tobacco  5-9 cigarettes (between 1/4 to 1/2 pack)/day in last 30 days, No, Cigarettes, 03/26/2020  Family History  CAD (coronary artery disease): Mother.  Diabetes mellitus type 2: Mother.  High blood pressure 09-AUG-2016 15:25:48<$>: Mother.  Mesothelioma: Father.  Primary malignant neoplasm of breast: Other.  Immunizations  Vaccine Date Status   influenza virus vaccine, inactivated 10/09/2019 Given   influenza virus vaccine, inactivated 10/12/2018 Given   influenza virus vaccine, inactivated 10/23/2017 Given

## 2022-05-02 NOTE — HISTORICAL [facility] CERNER
This is a historical note converted from [facility]. Formatting and pictures may have been removed.  Please reference [facility] for original formatting and attached multimedia. Chief Complaint  metastatic breast cancer  History of Present Illness  Problem List:  1. Recurrent metastatic breast cancer ? 4/15/2017?  2. Left breast cancer upper outer quadrant, diagnosed 8-  ?  Current Treatment:  Faslodex monthly. Started on 6/6/2017.?Next dose due 6/10/19.  Ibrance 125 mg p.o. daily with food for 21 days followed by 7 days off. Started 9/20/2017. Next cycle starts 5/16/19.  Xgeva subcu monthly. Started 11/3/2017. Holding for treatment of ONJ.  ?   Treatment History:?  S/p left lumpectomy 8/12/2009  S/p Left mastectomy 9/8/2009  Taxotere/Cytoxan beginning on 10/1/09 and [place] in [place] [facility] [place] reports 2 rounds of chemo)  -Aromasin inhibitor for 1 month. ?Patient did not continue this medication she says because of loss of insurance.?  Xeloda stopped on 6-8-17 2/2 painful mucositis.  Right thoracentesis on 5/2/17, which showed cytomorphology of adenocarcinoma, consistent with a breast primary.  ?   History of Present Illness  Left breast cancer upper outer quadrant?ER 90%, UT 15%, Her 2 +1 by IHC, ki 67- 25%  ?Surgery: left lumpectomy 8/12/2009. ?Pathology: 1.2 cm invasive ductal ca, grade 2. ?Lymphovascular invasion, positive margin  Surgery: left mastectomy ?9/8/2009. ?Pathology: ?invasive ductal carcinoma largest tumor nodule 1.1, smaller nodule 0.5 cm. ?DCIS + high grade lymphatic invasion: NO. Perineural invasion: NO.?  Margin: Positive to ink margin of largest 0 of 21 nodes ER +/UT +, ? HER2 1+ by IHC. Stage: unknown. ?Treatment: Taxotere/Cytoxan beginning on 10/1/09 and ending in January 2010 [place] [facility] [place](patient reports 2 rounds of chemo). ?Aromasin inhibitor for 1 month. ?Patient did not continue this medication she says because of loss of insurance.?  ?   1/2010: Right mastectomy at age 54 followed  by reconstruction.  ?   ?She presented to the ER on 4/15/17 with three-month history of left upper quadrant pain, nausea/vomiting and progressive shortness of breath. Past medical history significant for heavy smoking.?  ?CT imaging identified extensive metastatic disease. She underwent a right thoracentesis on 5/2/17, which showed cytomorphology of adenocarcinoma, consistent with a breast primary.  ????  Recurrence:??CT chest/abd/pelvis 4/15/2017 -subcarinal node 2.1 x 3.0 cm, Right hilar lymph node 1.6 x 1.5 cm. Extensive left internal mammary lymphadenopathy ?3.6 x 4.4 cm enlarged internal mammary and left superior mediastinal lymph nodes as well. Right anterior mediastinal lymph nodes 1.0 x 1.5 cm. ?Right para-aortic lymph node ?1.2 x 2.2 cm. Multiple enlarged paracardiac/cardiophrenic lymph nodes 1.7 x 2.0 cm. Large right and moderate left pleural effusions. ?Multifocal right pleural soft tissue nodularity 2.4 x 1.7 cm. Numerous pulmonary nodules. Extensive osseous metastatic disease with involvement of the thoracic spine, sternum, and suspected involvement of several ribs. Innumerable hepatic metastatic lesions 4.0 x 3.6 cm and 3.1x 2.6 cm. Multiple small retroperitoneal nodes. Metastatic deposit in the retroperitoneum lateral to the right kidney 2.1 x 2.7 cm. Xeloda stopped on 6-8-17 2/2 painful mucositis. Started Faslodex on 06/07/2014, with dramatic turn around in her general health.??  ?  3/13/19: This patient follows up for 1 month follow up. She has been stable on Ibrance and faslodex. She has no new or worsening complaints. Has a relatively good performance status. She has no intolerable side effects or toxicities to treatment.  ?   4/9/19: Patient presents for follow up on Ibrance/Faslodex/Xgeva; she is scheduled to start her week off on Thursday 4/11/19. She complains of weakness/fatigue and?constant bilateral hip pain 5/10, neither of which are new symptoms. Her K+ is low at 3.4; she states she  stopped taking her potassium. She likes bananas; advised her to eat at least one banana a day. WBC 3.9; ANC 2.02. H&H are normal at 13.3 & 38.4. Apparently, the patient was experiencing tooth pain and pain with eating for 18months; her sister states she says good to everything when she goes to doctor appointments. Xgeva was placed on hold once the Oncology clinic was informed of her symptoms. Her dentist is planning surgery; she does not know what surgery is being planned. She was recently placed on antibiotics. Her appetite is so-so per her report; however, since being placed on antibiotics, pain with eating has resolved. She does not want an appetite stimulant. She has dry skin across her chest; advised her to use Eucerin or CeraVe. Will have her follow up in 4 weeks with labs on her off week which is between 5/9/19 and 5/15/19. She is amenable to this plan.  ?   5/9/19: Patient presents for follow up on Ibrance/Faslodex; she is scheduled to receive Faslodex tomorrow and scheduled to start her next cycle of Ibrance on 5/16/19. She complains of weakness in bilateral legs that started yesterday and hip pain 6/10. She presents with her daughter who is most concerned about the patient getting her Percocet refilled by this clinic. Records indicate she last got Percocet refilled by a provider here at [facility] but not in Oncology, and this was 3/29/18; over a year ago. She has been getting Tramadol regularly from Dr. Hutson in [place] and alprazolam from Dr. Sol in [place]. No one in Oncology has prescribed any controlled substance for her since January 2018. She complains of hip pain and is on Xgeva; however, it is on hold for ONJ. She sees her dentist and oral surgeon on the 28th to discuss her treatment plan. Bone scans from 9/2018 indicate healing of bone mets in the bony pelvis; subsequent bone scans in 12/2018 indicate no metastatic bone lesions. Therefore, her hip pain is likely not due to cancer. Advised her to  see her PCP; she states she sees [facility] but has not seen her PCP in 1.5 years. Our records indicate as such. Im not sure of the relationship between the patient and Dr. Hutson or [facility]; however, I will refer her back to [facility] to re-establish care. AST 13, ALT 27, Alk Phos 91. ANC 1560. Will have her follow up in 4 weeks on her off week with labs. She will need to see Dr. Amos in 8 weeks to review scans.  ?  Interval History  6/10/19: Patient presents for follow up on Ibrance/Faslodex; she is scheduled to start Ibrance on 6/13/19 and get Faslodex on 6/11/19. She complains of weakness, fatigue, sore throat for a month, breast lumps/masses; left hip pain 6/10, and throat pain 6/10. She exhibits Grade 2 neutropenia with WBC at 3.6 and ANC 1260. Although her WBC is improved since last month, my concern is that it is improved because she is fighting infection. She is febrile at 100F; her heart rate is 126. Her skin is hot to touch. She reports being cold but states she also has chills; she is covered up under a blanket during the visit. She states her dentist prescribed amoxicillin in case she needed it for her teeth; she took it for sore throat but still has symptoms after a month. Her left submandibular LN is enlarged, palpable, and tender to touch. Advised patient and her daughter to present downstairs to the ER. Per prescriber guidelines for Ibrance, will?withhold and repeat in one week; will?restart at same dose if her counts and symptoms have resolved when she returns.  Review of Systems  A complete 12-point?ROS was performed in full with pertinent positives as described in interval history. Remainder of ROS done in full and are negative.  Physical Exam  Vitals & Measurements  T:?37.8? ?C (Oral)? HR:?126(Peripheral)? RR:?20? BP:?105/85?  HT:?160?cm? WT:?64.6?kg? BMI:?25.23?  General: ?Alert and oriented, No acute distress.??  Appearance: Well-groomed  HEENT: ?Normocephalic, Oral mucosa is  moist.?Pupils are equal, round and reactive to light, Extraocular movements are intact, Normal conjunctiva.?No tonsillar or pharyngeal erythema noted on exam; view limited due to patients mouth not being open wide.  Neck: ?Supple, Non-tender, No thyromegaly.??Left submandibular lymphadenopathy tender to palpation.  Respiratory: ?Lungs are clear to auscultation, Respirations are non-labored, Breath sounds are equal, Symmetrical chest wall expansion.??  Cardiovascular: ?Normal rate, Regular rhythm, No edema.??  Breast:??Breast exam not performed on todays visit.??  Gastrointestinal: ?Soft, Non-tender, Non-distended, Normal bowel sounds.??  Musculoskeletal: ?Normal strength.??  Integumentary: ?Warm, dry, intact.??  Neurologic: ?Alert, Oriented, No focal deficits, Cranial Nerves II-XII are grossly intact.??  Cognition and Speech: ?Oriented, Speech clear and coherent.??  Psychiatric: ?Cooperative, Appropriate mood & affect. ?  ECOG Performance Scale: 2 - Capable of all self-care but unable to carry out any work activities. Up and about greater than 50 percent of waking hours.?  Assessment/Plan  1.?Invasive ductal carcinoma of left breast?C50.912  Assessment:  1.?Malignant neoplasm of upper-outer quadrant of left female breast?C50.412  ????Metastatic Left breast cancer.? Diagnosed 08/21/2009.?ER positive.? SC positive.? HER-2 negative.? Lumpectomy 08/12/2009.? Mastectomy 09/08/2009 (1.1 cm IDC; high-grade lymphatic invasion; margin positive). ?Adjuvant?chemotherapy with TC x2 (10/01/2009-01/2010).? Aromatase inhibitor only for 1 month (then, lost her insurance).  ?   ??2017: Metastatic Adenocarcinoma Breast with malignant pleural effusion, mets to liver, bones and peritoneum (4/2017)  -6/7/17: Faslodex started 06/07/2017. ?Ibrance added 09/07/2017.??  ->From?bedbound, extremely poor performance status,?experienced extremely dramatic recovery with Faslodex.  -12/20/17, pt continues to have response to chemotherapy. Mets  are either stable or decreased in size.?  -3/23/18: Restaging scans ?Stable metastatic disease; CA 27.29 level continues to decline indicating response to treatment  -6/29/18:?CT C/A/P: No changes in hepatic and osseous metastatic disease since 3/23/18. Similar moderate left pleural effusion. 3 mm right upper lobe nodule is not definitively seen before, but is nonspecific. Suggest attention on follow-up studies. Unchanged extensive skeletal metastatic involvement.  -09/04/2018: Nuclear medicine bone scan and restaging CT scans chest/abdomen/pelvis with contrast: Stable metastatic disease.  -CA 27.29 level consistently dropping  -09/12/2018: ECOG 3 overall, stable  -11/14/2018:?CT head with and without contrast (headaches): No intracranial metastasis  -12/12/2018: Restaging scans:?Stable metastatic disease;?unchanged?left pleural effusion  -12/12/18: Bone scan: No evidence of metastatic disease  ?   Plan:  -Stable metastatic disease, without worsening,?on current regimen of Faslodex plus Ibrance.  -Reasonably satisfactory functional status, varying between ECOG 1 and ECOG 2  -Current treatment: Faslodex and Ibrance as endocrine therapy for metastatic breast cancer; has had very good response, followed by stable metastatic disease.?  -Continue to follow CA 27.29 level monthly.  -Calcium plus vitamin D twice a day with Xgeva?to prevent hypocalcemia  -Apparently,?we are experiencing considerable?difficulty?drawing her blood from labs. ?She has to be stuck multiple times. ?Therefore, we have decided?to space out restaging scans?to every 6 months?unless?absolutely needed?at shorter time intervals. ?She completely agrees?enthusiastically.  -->Restage with contrast enhanced CT scans of chest/abdomen/pelvis and whole-body nuclear medicine bone scan in 6 months?(June 2019);?earlier,?if necessitated?by?symptoms, signs,?or worsening labs.  ?   Hold Ibrance.  Follow up in one week with CBC, CMP, CA 27-29.  Patient would like to  hold Faslodex until next week when she will tentatively resume Ibrance if her counts and symptoms resolve.  Ordered:  ?  2.?Bone metastases?C79.51  Osseous metastatic disease  -continue Tramadol as prescribed prn for pain  ?   Xgeva on hold for treatment of ONJ by patients dentist.  Continue Calcium + Vitamin D twice daily.?  Ordered:  ?   Problem List/Past Medical History  Ongoing  BMI 26.0-26.9,adult  Bone metastases  Breast ca  Cancer, metastatic  Hepatic neoplasm secondary  Hypertension  Invasive ductal carcinoma of left breast  Liver cancer, primary, with metastasis from liver to other site  New onset of headaches  Obesity  Terminal Illness  Tobacco user  Tobacco user  Unsteady gait  Historical  No qualifying data  Procedure/Surgical History  Drainage of Right Pleural Cavity with Drainage Device, Percutaneous Approach (2017)  Thoracentesis, needle or catheter, aspiration of the pleural space; with imaging guidance (2017)  Mastectomy ()  Appendectomy;  back surgery    Mastectomy   Medications  amlodipine 5 mg oral tablet, 5 mg= 1 tab(s), Oral, Daily  calcium (as carbonate)-vitamin D 500 mg-400 intl units oral tablet, 1 tab(s), Oral, BID, 11 refills  dexamethasone 1 mg oral tablet, 1 tab, Oral, Daily  haloperidol 1 mg oral tablet, 1 mg= 1 tab(s), Oral, q4hr, PRN,? ?Not taking  haloperidol 2 mg/mL oral concentrate  Ibrance 125 mg oral capsule, 125 mg= 1 cap(s), Oral, Daily, 4 refills  PANTOPRAZOLE SODIUM 40 MG TBEC, 40 mg= 1 tab(s), Oral, Daily  Percocet 5/325 oral tablet, 1 tab(s), Oral, q6hr, PRN,? ?Not taking  polyethylene glycol 3350 oral powder for reconstitution  Protonix 20 mg ORAL enteric coated tablet, 20 mg= 1 tab(s), Oral, Daily,? ?Not Taking per Prescriber  QUETIAPINE FUMARATE 25 MG TABS, 25 mg= 1 tab(s), Oral, qPM,? ?Not taking  SENNA 8.6 MG TABS, 17.2 mg= 2 tab(s), Oral, Daily  Seroquel 50 mg oral tablet, 50 mg= 1 tab(s), Oral  traMADol 50 mg oral tablet, 50 mg= 1 tab(s),  Oral, q6hr, PRN  Xanax 0.5 mg oral tablet, 0.5 mg= 1 tab(s), Oral, TID  Allergies  HYDROcodone?(altered mental state)  Social History  Abuse/Neglect  No, No, Yes, 06/10/2019  Alcohol  Past, 12/17/2018  Never, 04/15/2017  Employment/School  Retired, 12/17/2018  Retired, 05/03/2018  Home/Environment  Lives with Alone., 12/17/2018  Lives with Alone., 05/03/2018  Nutrition/Health  Regular, 05/03/2018  Sexual  Sexual orientation: Straight or heterosexual. Gender Identity Identifies as female., 03/12/2019  Substance Use  Never, 12/17/2018  Never, 04/15/2017  Tobacco  10 or more cigarettes (1/2 pack or more)/day in last 30 days, No, 06/10/2019  Family History  CAD (coronary artery disease): Mother.  Diabetes mellitus type 2: Mother.  High blood pressure 09-AUG-2016 15:25:48<$>: Mother.  Mesothelioma: Father.  Primary malignant neoplasm of breast: Other.  Immunizations  Vaccine Date Status   influenza virus vaccine, inactivated 10/12/2018 Given   influenza virus vaccine, inactivated 10/23/2017 Given   Health Maintenance  Health Maintenance  ???Pending?(in the next year)  ??? ??OverDue  ??? ? ? ?Advance Directive due??01/01/19??and every 1??year(s)  ??? ? ? ?Alcohol Misuse Screening due??01/01/19??and every 1??year(s)  ??? ? ? ?Cognitive Screening due??01/01/19??and every 1??year(s)  ??? ? ? ?Functional Assessment due??01/01/19??and every 1??year(s)  ??? ? ? ?Geriatric Depression Screening due??01/01/19??and every 1??year(s)  ??? ? ? ?Smoking Cessation due??01/01/19??and every 1??year(s)  ??? ??Due?  ??? ? ? ?Aspirin Therapy for CVD Prevention due??06/10/19??and every 1??year(s)  ??? ? ? ?Bone Density Screening due??06/10/19??Variable frequency  ??? ? ? ?Breast Cancer Screening (Senior Wellness) due??06/10/19??and every?  ??? ? ? ?Colorectal Screening (Senior Wellness) due??06/10/19??and every?  ??? ? ? ?Hypertension Management-Education due??06/10/19??and every 1??year(s)  ??? ? ? ?Lung Cancer Screening due??06/10/19??and  every 1??year(s)  ??? ? ? ?Pneumococcal Vaccine due??06/10/19??Variable frequency  ??? ? ? ?Pneumococcal Vaccine due??06/10/19??and every?  ??? ? ? ?Tetanus Vaccine due??06/10/19??and every 10??year(s)  ??? ? ? ?Zoster Vaccine due??06/10/19??and every 100??year(s)  ??? ??Due In Future?  ??? ? ? ?Fall Risk Assessment not due until??01/01/20??and every 1??year(s)  ??? ? ? ?Obesity Screening not due until??01/01/20??and every 1??year(s)  ??? ? ? ?ADL Screening not due until??02/12/20??and every 1??year(s)  ??? ? ? ?Hypertension Management-Blood Pressure not due until??06/09/20??and every 1??year(s)  ??? ? ? ?Hypertension Management-BMP not due until??06/09/20??and every 1??year(s)  ???Satisfied?(in the past 1 year)  ??? ??Satisfied?  ??? ? ? ?ADL Screening on??02/12/19.??Satisfied by Mireille Langston LPN  ??? ? ? ?Blood Pressure Screening on??06/10/19.??Satisfied by Fabiola Kaiser RN  ??? ? ? ?Body Mass Index Check on??06/10/19.??Satisfied by Mireille Langston LPN  ??? ? ? ?Depression Screening on??04/09/19.??Satisfied by Heath Mederos LPN  ??? ? ? ?Diabetes Screening on??06/10/19.??Satisfied by Paulina Luu  ??? ? ? ?Fall Risk Assessment on??05/09/19.??Satisfied by Beverly Arevalo RN  ??? ? ? ?Hypertension Management-Blood Pressure on??06/10/19.??Satisfied by Fabiola Kaiser RN  ??? ? ? ?Influenza Vaccine on??10/12/18.??Satisfied by [place]bury LPN, Beonka S  ??? ? ? ?Obesity Screening on??06/10/19.??Satisfied by Fairbury LPN, Beonka S  ??? ? ? ?Smoking Cessation on??09/12/18.??Satisfied by Fairbury LPN, Beonka S  ?  ?  Lab Results  Test Name Test Result Date/Time   Sodium Lvl 136 mmol/L 06/10/2019 13:13 CDT   Potassium Lvl 3.8 mmol/L 06/10/2019 13:13 CDT   Chloride 103 mmol/L 06/10/2019 13:13 CDT   CO2 27 mmol/L 06/10/2019 13:13 CDT   Calcium Lvl 9.4 mg/dL 06/10/2019 13:13 CDT   Glucose Lvl 146 mg/dL (High) 06/10/2019 13:13 CDT   BUN 10 mg/dL 06/10/2019 13:13 CDT   Creatinine 0.90 mg/dL 06/10/2019 13:13  CDT   eGFR-AA 81 mL/min (Low) 06/10/2019 13:13 CDT   eGFR-FLORI 67 mL/min (Low) 06/10/2019 13:13 CDT   Bili Total 1.1 mg/dL (High) 06/10/2019 13:13 CDT   Bili Direct 0.3 mg/dL 06/10/2019 13:13 CDT   Bili Indirect 0.8 mg/dL 06/10/2019 13:13 CDT   AST 16 unit/L 06/10/2019 13:13 CDT   ALT 21 unit/L 06/10/2019 13:13 CDT   Alk Phos 85 unit/L 06/10/2019 13:13 CDT   Total Protein 7.5 gm/dL 06/10/2019 13:13 CDT   Albumin Lvl 3.2 gm/dL (Low) 06/10/2019 13:13 CDT   Globulin 4.30 gm/mL (High) 06/10/2019 13:13 CDT   A/G Ratio 0.7 ratio (Low) 06/10/2019 13:13 CDT   WBC 3.6 x10(3)/mcL (Low) 06/10/2019 13:13 CDT   RBC 3.46 x10(6)/mcL (Low) 06/10/2019 13:13 CDT   Hgb 14.0 gm/dL 06/10/2019 13:13 CDT   Hct 40.1 % 06/10/2019 13:13 CDT   Platelet 80 x10(3)/mcL (Low) 06/10/2019 13:13 CDT   .9 fL (High) 06/10/2019 13:13 CDT   MCH 40.5 pg (High) 06/10/2019 13:13 CDT   MCHC 34.9 gm/dL 06/10/2019 13:13 CDT   RDW 14.9 % (High) 06/10/2019 13:13 CDT   MPV 11.2 fL (High) 06/10/2019 13:13 CDT   Abs Neut 1.26 x10(3)/mcL (Low) 06/10/2019 13:13 CDT       Patient began to feel worse after the visit. Took her downstairs to ER via wheelchair. Gave report to TRACY Hicks regarding patients cancer history and symptoms today.

## 2022-05-02 NOTE — HISTORICAL [facility] CERNER
This is a historical note converted from Jeanne. Formatting and pictures may have been removed.  Please reference Jeanne for original formatting and attached multimedia. Chief Complaint  Breast Cancer  History of Present Illness  Problem List:  1. Recurrent metastatic breast cancer ? 4/15/2017?  2. Left breast cancer upper outer quadrant, diagnosed 8-  -Treatment: ?Taxotere/Cytoxan beginning on 10/1/09 and ending in January 2010 at [facility] (patient reports 2 rounds of chemo)  -Aromasin inhibitor for 1 month. ?Patient did not continue this medication she says because of loss of insurance.?  ?  Current Treatment:  Faslodex monthly. Started on 6-6-2017. Next dose due 9/11/19.??  Ibrance 125 mg p.o. daily with food for 21 days followed by 7 days off. Started 9-. Dose reduced to 100mg on 6/23/19. Next cycle starts 9/16/19.  Xgeva subcu monthly. 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  ??  Treatment History?  Xeloda stopped on 6-8-17 2/2 painful mucositis.  Right thoracentesis on 5/2/17, which showed cytomorphology of adenocarcinoma, consistent with a breast primary.  ?   ????  Clinical History  1.?Left breast cancer upper outer quadrant?ER 90%, MI 15%, Her 2 +1 by IHC, ki 67- 25%  ?Surgery: left lumpectomy 8/12/2009. ?Pathology: 1.2 cm invasive ductal ca, grade 2. ?Lymphovascular invasion, positive margin  Surgery: left mastectomy ?9/8/2009. ?Pathology: ?invasive ductal carcinoma largest tumor nodule 1.1, smaller nodule 0.5 cm. ?DCIS + high grade lymphatic invasion: NO. Perineural invasion: NO.?  Margin: Positive to ink margin of largest 0 of 21 nodes ER +/MI +, ? HER2 1+ by IHC. Stage: unknown. ?Treatment: Taxotere/Cytoxan beginning on 10/1/09 and ending in January 2010 at [facility] ?(patient reports 2 rounds of chemo). ?Aromasin inhibitor for 1 month. ?Patient did not continue this medication she says because of loss of insurance.?  ?She presented to the ER on 4/15/17 with three-month history  of left upper quadrant pain, nausea/vomiting and progressive shortness of breath. Past medical history significant for heavy smoking.?  ?CT imaging identified extensive metastatic disease. She underwent a right thoracentesis on 5/2/17, which showed cytomorphology of adenocarcinoma, consistent with a breast primary.  ????  Recurrence:??CT chest/abd/pelvis 4/15/2017 -subcarinal node 2.1 x 3.0 cm, Right hilar lymph node 1.6 x 1.5 cm. Extensive left internal mammary lymphadenopathy ?3.6 x 4.4 cm enlarged internal mammary and left superior mediastinal lymph nodes as well. Right anterior mediastinal lymph nodes 1.0 x 1.5 cm. ?Right para-aortic lymph node ?1.2 x 2.2 cm. Multiple enlarged paracardiac/cardiophrenic lymph nodes 1.7 x 2.0 cm. Large right and moderate left pleural effusions. ?Multifocal right pleural soft tissue nodularity 2.4 x 1.7 cm. Numerous pulmonary nodules. Extensive osseous metastatic disease with involvement of the thoracic spine, sternum, and suspected involvement of several ribs. Innumerable hepatic metastatic lesions 4.0 x 3.6 cm and 3.1x 2.6 cm. Multiple small retroperitoneal nodes. Metastatic deposit in the retroperitoneum lateral to the right kidney 2.1 x 2.7 cm. Xeloda stopped on 6-8-17 2/2 painful mucositis. Started Faslodex on 06/07/2014, with dramatic turn around in her general health. ?  ?  9/07/2017?Started concurrent Ibrance 125mg po daily. ?With Faslodex, her general health has made a dramatic turnaround. ?From being bedbound until 2 or 3 months back, she became up and about, and bubbling with energy. ?Enjoying life. ?Enjoying her familys company as well as reunion. ?She mows her own glass in her backyard. ?Appetite is great. ?The only symptom she has is pain in hips and lower back, 4 out on a scale of 1-10, if 10 could be the worst. ?Takes tramadol only sparingly. ?Before starting treatment, she had lost 30 pounds in last 6 months, but says that she has regained 15 pounds back. ?Denies  neurological symptoms chest pain shortness of breath abdominal pain nausea vomiting etc.  ?  Presents for a follow-up visit. ?Extremely well. ?Bubbling with energy. ?Excellent appetite. ?No significant aches or pains. ?9/07/2017, CA 27.29 level was greater than 450. ?It was 4999 on 05/16/2017. CEA level 10.2. ?CBC unremarkable. ?CA 15-3 level elevated to 1836. ?Whole-body bone scan on 09/15/2017 showed diffuse hypermetabolic activity throughout the spine, bilateral ribs, pelvis, and proximal femora, possibly with some increased activity in the calvarium as well, all findings consistent with widespread skeletal metastasis. ?Follow-up CT scans of abdomen pelvis on 09/15/2017 showed a few scattered subcentimeter lung nodules in the right lung which were not seen previously but could have been masked secondary to large pleural effusion on previous exam; a small right pleural effusion; left lung nodules are not seen on the current exam; left pleural effusion has increased; widespread liver metastasis appears smaller; widespread skeletal metastases were again noted, worse at T6 and T7; stable mesenteric nodules.??  ?  Interval History?  12/5/19: Patient presents today for follow up on Ibrance/Faslodex; she is scheduled to receive Faslodex tomorrow and start her next cycle of Ibrance on . She recently underwent imaging of the left femur to rule out disease progression. Results revealed sclerosis from treated metastasis. Per Dr. Amos, will proceed with restaging scans in?2 weeks. She complains that she is weak; this started yesterday per her report. BP is WNL; HR is elevated at 112bpm She has a low grade temp of 99.7F; WBC low at 2.3; . Will hold next cycle of Ibrance. CMP stable. H&H 12.9 & 38.8; plts 102k. Will recheck labs in one week in preparation to resume Ibrance at 75mg dose. Will have her follow up in 3-4 weeks with Dr. Amos to review CT and bone scan results. She is amenable to this plan.  Review of  Systems  A complete 12-point?ROS was performed in full with pertinent positives as described in interval history. Remainder of ROS done in full and are negative.  Physical Exam  Vitals & Measurements  T:?37.6? ?C (Oral)? HR:?112(Peripheral)? RR:?20? BP:?116/83?  HT:?160?cm? WT:?68.8?kg? BMI:?26.88?  General: ?Alert and oriented, No acute distress.??  Appearance: Well-groomed  HEENT: ?Normocephalic, Oral mucosa is moist.?Pupils are equal, round and reactive to light, Extraocular movements are intact, Normal conjunctiva.?  Neck: ?Supple, Non-tender, No lymphadenopathy, No thyromegaly.??  Respiratory: ?Lungs are clear to auscultation, Respirations are non-labored, Breath sounds are equal, Symmetrical chest wall expansion.??  Cardiovascular: ?Normal rate, Regular rhythm, No edema.??  Breast:??Breast exam not performed on todays visit.??  Gastrointestinal: ?Soft, Non-tender, Non-distended, Normal bowel sounds.??  Musculoskeletal: ?Normal strength.??Presents with walking cane.  Integumentary: ?Warm, dry, intact.??Dry, flaky skin.  Neurologic: ?Alert, Oriented, No focal deficits, Cranial Nerves II-XII are grossly intact.??  Cognition and Speech: ?Oriented, Speech clear and coherent.??  Psychiatric: ?Cooperative, Appropriate mood & affect. ?  ECOG Performance Scale: 2 - Capable of all self-care but unable to carry out any work activities. Up and about greater than 50 percent of waking hours.?  Assessment/Plan  1.?Invasive ductal carcinoma of left breast?C50.912  Assessment:  1. Left breast cancer.? Diagnosed 08/21/2009.?ER positive.? FL positive.? HER-2 negative.? Lumpectomy 08/12/2009.? Mastectomy 09/08/2009 (1.1 cm IDC; high-grade; lymphatic invasion; margin positive). ?Adjuvant?chemotherapy with TC x2 (10/01/2009-01/2010).? Aromatase inhibitor only for 1 month (then, lost her insurance).  ?   --> Metastatic Adenocarcinoma Breast with malignant pleural effusion, mets to liver, bones and peritoneum (4/2017)  ?   -6/7/17:  Faslodex started 06/07/2017. ?Ibrance added 09/07/2017.??  ->From?bedbound, extremely poor performance status,?experienced extremely dramatic recovery with Faslodex?plus Ibrance.  -12/20/17, pt continues to have response to chemotherapy. Mets are either stable or decreased in size.?  -3/23/18: Restaging scans ?Stable metastatic disease; CA 27.29 level continues to decline indicating response to treatment  -6/29/18:?CT C/A/P: Unchanged metastasis  -09/04/2018: Nuclear medicine bone scan and restaging CT scans chest/abdomen/pelvis with contrast: Stable metastatic disease.  -CA 27.29 level consistently dropping  -09/12/2018: ECOG 3 overall, stable  -11/14/2018:?CT head with and without contrast (headaches): No intracranial metastasis  -12/12/2018: Restaging scans:?Stable metastatic disease;?unchanged?left pleural effusion  CA 27.29 level 90.1?on 12/13/2018,?consistently dropping?(was 428?in 01/2018)  -06/10/2019: CT chest with contrast: Right middle and lower lobe pulmonary emboli; continued bilateral pleural effusions; widespread bone metastasis with similar overall appearance since 12/12/2018.  -06/10/2019: CT soft tissues of the neck with contrast: No cervical lymphadenopathy; diffuse bone metastasis.  -06/17/2019:?Restaging CTs C/A/P with contrast?and bone scan: Stable metastatic disease  -10/03/2019: Restaging CT soft tissues of the neck: Extensive spinal metastasis, stable; persistent left pleural effusion; no new change  -09/18/2019: Restaging whole-body bone scan (comparison: 06/17/2019 bone scan): Increased abnormal uptake to the distal left femoral metaphysis may represent progression of disease; otherwise, widespread bone metastasis stable  -09/18/2019: Restaging CTs C/A/P with contrast: Stable exam with bilobar hepatic and diffuse osseous metastatic disease without significant interval change from 06/17/2019  -09/27/2019: Follow-up with Dr. Jean Claude Ley, oral surgeon: MR ONJ stage II with possible  secondary infection versus myositis, etc. (presentation with pain entire left side, 8/10, status post Augmentin which worked better than amoxicillin; noncontrast CT ordered)-09/10/2019: CA 27.29 level 118.6 (somewhat on upward trajectory since 06/2019)  ?   ??????  2. ?Osseous metastatic disease  -continue Tramadol as prescribed prn for pain  -03/20/2019:?Communication from a dentist:?Osteonecrosis of jaw; Xgeva?held?(she never informed us?of?continued jaw pain ever since extraction of teeth?1-1/2 years prior in September/October 2017).?  ?   ??????  3.?Headaches, weakness, ataxia  -CT Head without contrast (unable to obtain IV access)?shows no intracranial abnormality or?no mass effect.  ?  ?   4. ?Ibrance induced neutropenia  -ANC 0.51 on June 13; 0.49 on June 12; 0.77 on June 11; 1.26 on Cassie 10; 1.56 on May 9.? Previously, her ANC dropped to 0.76 on 11/04/2018: 0.79 on 10/16/2018; 0.62 on 10/12/2018, etc.  ?  ?   5. ?Pulmonary embolism  -Incidentally noted?right middle and lower lobe artery emboli?on chest CT 06/10/2019?(no RV strain; echocardiogram normal)  ?   Plan:  Question of?metastasis?in the distal left femoral metaphysis?on restaging bone scan 06/17/2019,?suggesting progression of disease.  Will evaluate with plain x-ray?and MRI scan of left femur.  ?  Until confirmation?of disease progression, continue same treatment with?fulvestrant?plus palbociclib. ?Palbociclib dose has been?decreased from 125 mg?to 100 mg in view of recurrent neutropenia).  ?  Monitor CA 27.29 level?every month.  ?  Check for PIK3CA mutation (if positive, then fulvestrant plus alpelisib is another category 1 treatment option).  ?  Test for germline BRCA1/2 mutation.? If positive, then treatment with PARP inhibitors will be an option (olaparib; talazoparib).  ?  Continue?indefinite anti-coagulation?for pulmonary emboli detected incidentally on chest CT dated 06/10/2019.  ?  Xgeva held from 03/20/2019?for osteonecrosis of the  jaw.  Patient follows up with Dr. Jean Claude Ley, oral surgeon.  ?  If no evidence of disease progression on imaging of left femur, then, will restage with contrast-enhanced CT scans of chest, abdomen, and pelvis, and whole-body nuclear medicine bone scan in 3 months (December).  ?  Patient remains under the care of home hospice?to help her with her daily needs.  ?  HOLD next cycle of Ibrance for Grade 3 neutropenia.  Ok to proceed with Faslodex tomorrow.  Recheck CBC, CMP in one week.  If labs WNL, ok to proceed with cycle of Ibrance at 75mg dose.  CT and xray of left femur indicate sclerosed areas of treated mets. Per Dr. Amos, will order restaging CTs and bone scan to be done in 2 weeks.  Follow up in?3-4 weeks with Dr. Amos?with CBC, CMP, Ca 27.29, and CT & bone scan results.  Ordered:  ?   Problem List/Past Medical History  Ongoing  BMI 26.0-26.9,adult  Bone metastases  Chemotherapy induced neutropenia  Hypertension  Impaired mobility  Invasive ductal carcinoma of left breast  Liver cancer, primary, with metastasis from liver to other site  Metastatic breast cancer  Tobacco user  Tobacco user  Historical  Breast ca  Cancer, metastatic  Hepatic neoplasm secondary  New onset of headaches  Obesity  Terminal Illness  Unsteady gait  Procedure/Surgical History  Drainage of Right Pleural Cavity with Drainage Device, Percutaneous Approach (2017)  Thoracentesis, needle or catheter, aspiration of the pleural space; with imaging guidance (2017)  Mastectomy ()  Appendectomy;  back surgery    Mastectomy   Medications  acetaminophen-codeine #4, Oral, q6hr  amLODIPine 5 mg oral tablet, 5 mg= 1 tab(s), Oral, Daily  calcium (as carbonate)-vitamin D 500 mg-400 intl units oral tablet, 1 tab(s), Oral, BID, 11 refills  dexamethasone 1 mg oral tablet, 1 mg= 1 tab(s), Oral, BID  haloperidol 1 mg oral tablet, 1 mg= 1 tab(s), Oral, TID  Ibrance 100 mg oral capsule, 100 mg= 1 cap(s), Oral, Daily, 1  refills  LORazepam 1 mg oral tablet, 1 mg= 1 tab(s), Oral, TID  PANTOPRAZOLE SODIUM 40 MG TBEC, 40 mg= 1 tab(s), Oral, Daily  polyethylene glycol 3350 oral powder for reconstitution  QUEtiapine 25 mg oral tablet, 25 mg= 1 tab(s), Oral, TID  SENNA 8.6 MG TABS, 17.2 mg= 2 tab(s), Oral, Daily  traMADol 50 mg oral tablet, 50 mg= 1 tab(s), Oral, q6hr, PRN  Xarelto 15mg Tablet, 15 mg= 1 tab(s), Oral, qPM  Allergies  HYDROcodone?(altered mental state)  Social History  Abuse/Neglect  No, No, Yes, 12/05/2019  Alcohol  Past, 12/17/2018  Never, 04/15/2017  Employment/School  Retired, 05/03/2018  Exercise  Exercise frequency: Daily. Exercise type: Walking., 10/16/2019  Home/Environment  Lives with Children. Living situation: Home/Independent. Oxygen, Walker/Cane, Wheelchair, TV/Computer concerns: No. Single family house, Risks in environment: Pets/Animal exposure., 10/16/2019  Nutrition/Health  Regular, 05/03/2018  Sexual  Sexual orientation: Straight or heterosexual. Gender Identity Identifies as female., 03/12/2019  Spiritual/Cultural  Restoration, 10/16/2019  Substance Use  Never, 04/15/2017  Tobacco  10 or more cigarettes (1/2 pack or more)/day in last 30 days, No, Cigarettes, 10 per day., 12/05/2019  Family History  CAD (coronary artery disease): Mother.  Diabetes mellitus type 2: Mother.  High blood pressure 09-AUG-2016 15:25:48<$>: Mother.  Mesothelioma: Father.  Primary malignant neoplasm of breast: Other.  Immunizations  Vaccine Date Status   influenza virus vaccine, inactivated 10/09/2019 Given   influenza virus vaccine, inactivated 10/12/2018 Given   influenza virus vaccine, inactivated 10/23/2017 Given   Health Maintenance  Health Maintenance  ???Pending?(in the next year)  ??? ??OverDue  ??? ? ? ?Advance Directive due??01/01/19??and every 1??year(s)  ??? ? ? ?Alcohol Misuse Screening due??01/01/19??and every 1??year(s)  ??? ? ? ?Cognitive Screening due??01/01/19??and every 1??year(s)  ??? ? ? ?Functional Assessment  due??01/01/19??and every 1??year(s)  ??? ? ? ?Smoking Cessation due??01/01/19??and every 1??year(s)  ??? ??Due?  ??? ? ? ?Aspirin Therapy for CVD Prevention due??12/05/19??and every 1??year(s)  ??? ? ? ?Bone Density Screening due??12/05/19??Variable frequency  ??? ? ? ?Breast Cancer Screening (Senior Wellness) due??12/05/19??and every?  ??? ? ? ?Colorectal Screening (Senior Wellness) due??12/05/19??and every?  ??? ? ? ?Hypertension Management-Education due??12/05/19??and every 1??year(s)  ??? ? ? ?Lung Cancer Screening due??12/05/19??and every 1??year(s)  ??? ? ? ?Pneumococcal Vaccine due??12/05/19??Variable frequency  ??? ? ? ?Pneumococcal Vaccine due??12/05/19??and every?  ??? ? ? ?Tetanus Vaccine due??12/05/19??and every 10??year(s)  ??? ? ? ?Zoster Vaccine due??12/05/19??and every 100??year(s)  ??? ??Due In Future?  ??? ? ? ?Fall Risk Assessment not due until??01/01/20??and every 1??year(s)  ??? ? ? ?Geriatric Depression Screening not due until??01/01/20??and every 1??year(s)  ??? ? ? ?Obesity Screening not due until??01/01/20??and every 1??year(s)  ??? ? ? ?ADL Screening not due until??06/12/20??and every 1??year(s)  ??? ? ? ?Hypertension Management-Blood Pressure not due until??12/04/20??and every 1??year(s)  ??? ? ? ?Hypertension Management-BMP not due until??12/04/20??and every 1??year(s)  ???Satisfied?(in the past 1 year)  ??? ??Satisfied?  ??? ? ? ?ADL Screening on??06/12/19.??Satisfied by Linh Younger RN  ??? ? ? ?Blood Pressure Screening on??12/05/19.??Satisfied by Harjit Gresham  ??? ? ? ?Body Mass Index Check on??12/05/19.??Satisfied by Harjit Gresham  ??? ? ? ?Depression Screening on??10/16/19.??Satisfied by Mita Gamboa LPN.  ??? ? ? ?Diabetes Screening on??12/05/19.??Satisfied by Omayra Hardin  ??? ? ? ?Fall Risk Assessment on??12/05/19.??Satisfied by Harjit Gresham  ??? ? ? ?Geriatric Depression Screening on??10/16/19.??Satisfied by Mita Gamboa LPN  ??? ? ? ?Hypertension  Management-Blood Pressure on??12/05/19.??Satisfied by Harjit Gresham  ??? ? ? ?Influenza Vaccine on??10/09/19.??Satisfied by Verónica Brown  ??? ? ? ?Obesity Screening on??12/05/19.??Satisfied by Harjit Gresham  ?  Lab Results  Test Name Test Result Date/Time   Sodium Lvl 139 mmol/L 12/05/2019 13:16 CST   Potassium Lvl 4.2 mmol/L 12/05/2019 13:16 CST   Chloride 108 mmol/L (High) 12/05/2019 13:16 CST   CO2 25 mmol/L 12/05/2019 13:16 CST   Calcium Lvl 9.7 mg/dL 12/05/2019 13:16 CST   Glucose Lvl 120 mg/dL (High) 12/05/2019 13:16 CST   BUN 14 mg/dL 12/05/2019 13:16 CST   Creatinine 0.80 mg/dL 12/05/2019 13:16 CST   eGFR-AA 92 mL/min 12/05/2019 13:16 CST   eGFR-FLORI 76 mL/min (Low) 12/05/2019 13:16 CST   Bili Total 0.6 mg/dL 12/05/2019 13:16 CST   Bili Direct 0.2 mg/dL 12/05/2019 13:16 CST   Bili Indirect 0.4 mg/dL 12/05/2019 13:16 CST   AST 30 unit/L 12/05/2019 13:16 CST   ALT 46 unit/L 12/05/2019 13:16 CST   Alk Phos 91 unit/L 12/05/2019 13:16 CST   Total Protein 7.0 gm/dL 12/05/2019 13:16 CST   Albumin Lvl 2.8 gm/dL (Low) 12/05/2019 13:16 CST   Globulin 4.20 gm/mL (High) 12/05/2019 13:16 CST   A/G Ratio 0.7 ratio (Low) 12/05/2019 13:16 CST   WBC 2.3 x10(3)/mcL (Low) 12/05/2019 13:16 CST   RBC 3.42 x10(6)/mcL (Low) 12/05/2019 13:16 CST   Hgb 12.9 gm/dL 12/05/2019 13:16 CST   Hct 38.8 % 12/05/2019 13:16 CST   Platelet 102 x10(3)/mcL (Low) 12/05/2019 13:16 CST   .5 fL (High) 12/05/2019 13:16 CST   MCH 37.7 pg (High) 12/05/2019 13:16 CST   MCHC 33.2 gm/dL 12/05/2019 13:16 CST   RDW 14.6 % (High) 12/05/2019 13:16 CST   MPV 9.7 fL 12/05/2019 13:16 CST   Abs Neut 0.77 x10(3)/mcL (Low) 12/05/2019 13:16 CST   Diagnostic Results  (10/07/2019 13:37 CDT XR Femur Left 2 Views)  * Final Report *  ?  Reason For Exam  Pain  ?  Radiology Report  Clinical History:  Pain  ?  Technique:  2 views of the left femur.  ?  Comparison:  No prior imaging available for comparison.  ?  Findings:  ?  The pelvis is sclerotic  consistent with metastasis or treated  metastasis. There is no fracture identified of the femur. There is no  osseous correlation with increased uptake identified on nuclear  medicine bone scan. Early metastatic lesion is not excluded on the  basis of this exam.  ?  Impression:  The pelvis is sclerotic consistent with metastasis or treated  metastasis. There is no fracture identified of the femur. There is no  osseous correlation with increased uptake identified on nuclear  medicine bone scan. Early metastatic lesion is not excluded on the  basis of this exam.  ?  ?  Signature Line  Electronically Signed By: Antonio Barroso MD  Date/Time Signed: 10/07/2019 15:47  ?  ?  This document has an image  ?  ?  Result type:???????  XR Femur Left 2 Views  Result date:???????  October 07, 2019 13:37 CDT  Result status:???????  Auth (Verified)  Result title:???????  XR Femur Left 2 Views  Performed by:???????  Antonio Barroso MD on October 07, 2019 15:44 CDT  Verified by:???????  Antonio Barroso MD on October 07, 2019 15:47 CDT  Encounter info:???????  [id number], [facility], Clinic Visit, 10/7/2019 - 10/7/2019 [1]  ?  ?  (10/10/2019 11:44 CDT CT Extremity Lower Left W W/O Contrast)  Reason For Exam  Malignant neoplasm of unspecified site of left female breast;Pain in Joint  ?  Radiology Report  Comparison: Radiograph left femur used for comparison dated 10/7/2019.  Bone scan used for comparison dated 9/18/2018.  ?  INDICATION: Breast cancer. Hip pain. Evaluate for metastatic disease.  ?  TECHNIQUE: Enhanced and unenhanced, axial images of the left hip were  obtained along with coronal and sagittal reconstructions.  ?  FINDINGS:  ?  Widespread primarily sclerotic metastases are present throughout the  pelvis and the included portion of the lumbar spine. Numerous  sclerotic metastases are present to the left femoral head, neck, and  the intratrochanteric region of the left femur. No pathologic  or  insufficiency fracture is present to the proximal femur or left  hemipelvis.  ?  The left sacroiliac joint is preserved without erosion or  periarticular osteopenia. Mild osteoarthritic changes are present to  the hip joint. Calcific tendinitis is present to the common left  hamstring origin. Chondrocalcinosis is present to the acetabular  labrum and articular cartilage. The gluteal tendons appear grossly  intact. Iliopsoas tendon is intact. No trochanteric or iliopsoas  bursitis. No evidence for ischiofemoral impingement. No  lymphadenopathy seen to the included lymph node chains. The uterus is  normal for the patients age. Left ovary is normal in size. The  bladder is distended.  ?  IMPRESSION:  ?  Innumerable osseous metastases are present throughout the included  portion of the pelvis, lumbar spine, and proximal left femur. No  insufficiency or pathologic fracture identified to the femoral neck or  left hemipelvis.  ?  Chondrocalcinosis to the left hip along with early osteoarthritic  changes.  ?  ?  Signature Line  Electronically Signed By: Oral Yoder MD  Date/Time Signed: 10/10/2019 14:15  ?  ?  This document has an image  ?  ?  Result type:???????  CT Extremity Lower Left W W/O Contrast  Result date:???????  October 10, 2019 11:44 CDT  Result status:???????  Auth (Verified)  Result title:???????  CT Extremity Lower Left W W/O Contrast  Performed by:???????  Oral Yoder MD on October 10, 2019 13:51 CDT  Verified by:???????  Oral Yoder MD on October 10, 2019 14:15 CDT  Encounter info:???????  [id number], [facility], Outpatient, 10/10/2019 - 10/10/2019 [2]     [1]?XR Femur Left 2 Views; Antonio Barroso MD 10/07/2019 13:37 CDT  [2]?CT Extremity Lower Left W W/O Contrast; Oral Yoder MD 10/10/2019 11:44 CDT

## 2022-05-02 NOTE — HISTORICAL [facility]
This is a historical note converted from Jeanne. Formatting and pictures may have been removed.  Please reference Jeanne for original formatting and attached multimedia. Chief Complaint  breast cancer  History of Present Illness  Problem List:  1. Recurrent metastatic breast cancer ? 4/15/2017?  2. Left breast cancer upper outer quadrant, diagnosed 8-  -Treatment: ?Taxotere/Cytoxan beginning on 10/1/09 and ending in January 2010 at [facility] (patient reports 2 rounds of chemo)  -Aromasin inhibitor for 1 month. ?Patient did not continue this medication she says because of loss of insurance.?  ?  Current Treatment:  1. Arimidex 1mg PO daily  ??  Treatment History?  Xeloda stopped on 6-8-17 2/2 painful mucositis.  Right thoracentesis on 5/2/17, which showed cytomorphology of adenocarcinoma, consistent with a breast primary.  Faslodex 06/06/2017-01/09/2020, stopped d/t bone met progression  Ibrance 09/20/2017-01/09/2020, stopped d/t bone met progression  Xgeva held/discontinued 03/2019 d/t ONJ  ?   Clinical History  1.? Left breast cancer upper outer quadrant?ER 90%, SD 15%, Her 2 +1 by IHC, ki 67- 25%  ?Surgery: left lumpectomy 8/12/2009. ?Pathology: 1.2 cm invasive ductal ca, grade 2. ?Lymphovascular invasion, positive margin  Surgery: left mastectomy ?9/8/2009. ?Pathology: ?invasive ductal carcinoma largest tumor nodule 1.1, smaller nodule 0.5 cm. ?DCIS + high grade lymphatic invasion: NO. Perineural invasion: NO.?  Margin: Positive to ink margin of largest 0 of 21 nodes ER +/SD +, ? HER2 1+ by IHC. Stage: unknown. ?Treatment: Taxotere/Cytoxan beginning on 10/1/09 and ending in January 2010 at [facility] ?(patient reports 2 rounds of chemo). ?Aromasin inhibitor for 1 month. ?Patient did not continue this medication she says because of loss of insurance.?  ?She presented to the ER on 4/15/17 with three-month history of left upper quadrant pain, nausea/vomiting and progressive shortness of breath. Past medical history  significant for heavy smoking.?  ?CT imaging identified extensive metastatic disease. She underwent a right thoracentesis on 5/2/17, which showed cytomorphology of adenocarcinoma, consistent with a breast primary.  ????  Recurrence:??CT chest/abd/pelvis 4/15/2017 -subcarinal node 2.1 x 3.0 cm, Right hilar lymph node 1.6 x 1.5 cm. Extensive left internal mammary lymphadenopathy ?3.6 x 4.4 cm enlarged internal mammary and left superior mediastinal lymph nodes as well. Right anterior mediastinal lymph nodes 1.0 x 1.5 cm. ?Right para-aortic lymph node ?1.2 x 2.2 cm. Multiple enlarged paracardiac/cardiophrenic lymph nodes 1.7 x 2.0 cm. Large right and moderate left pleural effusions. ?Multifocal right pleural soft tissue nodularity 2.4 x 1.7 cm. Numerous pulmonary nodules. Extensive osseous metastatic disease with involvement of the thoracic spine, sternum, and suspected involvement of several ribs. Innumerable hepatic metastatic lesions 4.0 x 3.6 cm and 3.1x 2.6 cm. Multiple small retroperitoneal nodes. Metastatic deposit in the retroperitoneum lateral to the right kidney 2.1 x 2.7 cm. Xeloda stopped on 6-8-17 2/2 painful mucositis. Started Faslodex on 06/07/2014, with dramatic turn around in her general health. ?  ?   Interval History:  ?   Patient presents today via telephone for visit. Patient verbally consents to having visit completed over the phone. Daughter is present on the phone as well, patient verbalizes consent for daughter to be on the phone during the visit. She reports compliance with daily Arimidex. She states that she has not been taking her calcium and vitamin D as instructed?d/t hypercalcemia. She reports compliance with Xarelto daily for PE. She endorses no new problems or concerns today.?The patient remains on hospice at home at this time.?Denies HA, fever, night sweats, SOB, CP, edema, neuropathy. Denies N/V/C/D, abdominal pain, change in bowel/bladder habits, blood in the urine/stool. Appetite  good, weight stable, denies unintentional weight loss/gain. Labs today reviewed with patient and daughter.?Ca 11.0, CBC WNL.??Reviewed patient medications. She denies needing any refills today. Denies questions/needs/concerns.  ?  Review of Systems  A complete 12 point ROS was performed in full with pertinent positives described in interval history. Remainder of ROS done in full and are negative.?  Assessment/Plan  1.?Metastatic breast cancer?C50.919  ? To summarize:  #Left breast cancer.? Diagnosed 08/21/2009. ?ER positive. ?NH positive. ?HER-2 negative.  Lumpectomy 08/12/2009).? Mastectomy (09/08/2009)?(1.1 cm tumor; high-grade;?lymphatic invasion; margin positive).  Adjuvant TC x2?(10/01/2009-01/2010)  Adjuvant?aromatase inhibitor?for only 1 month (lost medical insurance)  04/2017: Metastatic disease:?Malignant pleural effusion, liver metastasis, bone metastasis, peritoneal metastasis  06/07/2017:?Faslodex started  09/07/2017:?Palbociclib added  Dramatic response?with endocrine therapy?(dramatic improvement in performance status)  -12/20/2019:?Worsening of metastatic disease (scans, tumor marker)?(minimally worsened bone metastasis;?at least 8?new peripheral?noncalcified lung nodules right lower lung, 3-28 mm,?microlobulated and spiculated, suspicious for metastasis), etc.  -Arimidex (second line endocrine therapy) started 01/09/2020  ?   #Genetic testing negative (01/06/2020)  ?   Plan:  Check intact PTH level?and PTH related peptide. PTH and PTH related peptide both WNL.  ?   In view of disease progression, we stopped?Faslodex and Ibrance.  Switched to aromatase inhibitor?(second line hormone therapy)?from 01/09/2020  Arimidex 1 mg p.o. daily.  ?   Since disease has progressed while?on CDK 4/6 inhibitor therapy (Ibrance), therefore,?no indication?of additional line of therapy with another CDK 4/6-containing regimen.  ?   If no clinical benefit after up to 3 sequential endocrine therapy regimens, or if  symptomatic visceral disease, then chemotherapy  ?   Monitor CA 27.29 level?every month.  ?   If PIK3CA mutation positive,?then fulvestrant plus alpelisib is another category 1 treatment option  Result is pending.  ?  Genetic testing negative for BRCA1/2 mutation. ?Therefore,?not a candidate for treatment with?PARP inhibitors (olaparib, talazoparib).  ?  Restage with contrast-enhanced CT scans of chest, abdomen, and pelvis, and whole-body nuclear medicine bone scan in 3 months (April 2020).  ?  Patient remains under the care of home hospice?to help her with her daily needs.  ?  -Restaging CT C/A/P scheduled: 04/09/2020  -C/W Ca 27.29 monthly monitoring?  ?  2.?Bone metastases?C79.51  #Osteonecrosis of jaw; Xgeva held from 03/20/2019??  ?  Xgeva held from 03/20/2019?for osteonecrosis of the jaw.  Patient follows up with Dr. Jean Claude Ley, oral surgeon.  Apparently, because of her?being on chronic anticoagulation,?oral surgery was avoided. ?She continues to complain of?pain when eating.  ?   -Continue to HOLD Xgeva  ?  3.?Long term current use of aromatase inhibitor?Z79.811  -Arimidex (second line endocrine therapy) started 01/09/2020  Continue endocrine therapy until progression or unacceptable toxicity ?  -03/20/2020 DEXA scan: normal bone mineral density  ?  -C/W Arimidex 1mg PO daily  -Continue to hold Calcium supplementation d/t hypercalcemia  -Repeat DEXA scan due: 03/2022  ?  4.?Hypercalcemia?E83.52  Noted to be hypercalcemic.  Cannot give Xgeva?or bisphosphonate?(osteonecrosis of the jaw).  Treated with IV fluids and?salmon calcitonin  ?   Advised her to to drink plenty of fluids?for hypercalcemia.  To report to emergency department?if she starts becoming confused.  ?   -Patient refuses to come into clinic at this time for IVF and calcitonin d/t COVID 19 for hypercalcemia.  ?  5.?Pulmonary embolism?I26.99  #06/10/2019:?Incidentally detected PE ?  ?  Continue?indefinite anticoagulation?for pulmonary emboli  incidentally?detected?on chest CT dated 06/10/2019.  Reports no bleeding  ?   -C/W Xarelto daily  ?  RTC 4 weeks with MD with labs: CBC, CMP, Ca 27.29 to review scans  ?  Discussed POC with patient, all questions answered. Instructed to call clinic for any issues or with any questions PRN, patient verbalizes understanding.  ?   25 minutes were spent discussing medical diagnoses specified above as well as treatment plan.  ?  Frida Kinsey APRN, FNP-C   Problem List/Past Medical History  Ongoing  Bone metastases  Hypercalcemia  Long term current use of aromatase inhibitor  Metastatic breast cancer  Pulmonary embolism  Historical  Breast ca  Cancer, metastatic  Hepatic neoplasm secondary  New onset of headaches  Obesity  Terminal Illness  Unsteady gait  Procedure/Surgical History  Drainage of Right Pleural Cavity with Drainage Device, Percutaneous Approach (2017)  Thoracentesis, needle or catheter, aspiration of the pleural space; with imaging guidance (2017)  Mastectomy ()  Appendectomy;  back surgery    Mastectomy   Medications  acetaminophen-codeine #4, Oral, q6hr  amLODIPine 5 mg oral tablet, 5 mg= 1 tab(s), Oral, Daily  Arimidex 1 mg oral tablet, 1 mg= 1 tab(s), Oral, Daily, 3 refills  calcium (as carbonate)-vitamin D 500 mg-400 intl units oral tablet, 1 tab(s), Oral, BID, 11 refills,? ?Not Taking per Prescriber  dexamethasone 1 mg oral tablet, 1 mg= 1 tab(s), Oral, BID  haloperidol 1 mg oral tablet, 1 mg= 1 tab(s), Oral, TID  Ibrance 75 mg oral capsule, 75 mg= 1 cap(s), Oral, Daily,? ?Not Taking per Prescriber  LORazepam 1 mg oral tablet, 1 mg= 1 tab(s), Oral, TID  PANTOPRAZOLE SODIUM 40 MG TBEC, 40 mg= 1 tab(s), Oral, Daily  polyethylene glycol 3350 oral powder for reconstitution  potassium chloride 10 mEq oral TABLET extended release, 10 mEq= 1 tab(s), Oral, Daily  potassium chloride 20 mEq oral TABLET extended release, 20 mEq= 1 tab(s), Oral, BID,? ?Unable to obtain: needs  refill  QUEtiapine 25 mg oral tablet, 25 mg= 1 tab(s), Oral, TID  SENNA 8.6 MG TABS, 17.2 mg= 2 tab(s), Oral, Daily  traMADol 50 mg oral tablet, 50 mg= 1 tab(s), Oral, q6hr, PRN  Xarelto 15mg Tablet, 15 mg= 1 tab(s), Oral, qPM  Zofran ODT 8 mg oral tablet, disintegrating, 8 mg= 1 tab(s), Oral, TID, PRN, 1 refills  Allergies  HYDROcodone?(altered mental state)  Social History  Abuse/Neglect  No, 01/30/2020  No, 01/10/2020  No, 01/09/2020  No, 01/03/2020  Alcohol  Past, 12/17/2018  Never, 04/15/2017  Employment/School  Retired, 05/03/2018  Exercise  Exercise frequency: Daily. Exercise type: Walking., 10/16/2019  Home/Environment  Lives with Children. Living situation: Home/Independent. Oxygen, Walker/Cane, Wheelchair, TV/Computer concerns: No. Single family house, Risks in environment: Pets/Animal exposure., 10/16/2019  Nutrition/Health  Regular, 05/03/2018  Sexual  Sexual orientation: Straight or heterosexual. Gender Identity Identifies as female., 03/12/2019  Spiritual/Cultural  [place], 10/16/2019  Substance Use  Never, 04/15/2017  Tobacco  5-9 cigarettes (between 1/4 to 1/2 pack)/day in last 30 days, No, 01/30/2020  10 or more cigarettes (1/2 pack or more)/day in last 30 days, No, 01/10/2020  10 or more cigarettes (1/2 pack or more)/day in last 30 days, No, 01/09/2020  10 or more cigarettes (1/2 pack or more)/day in last 30 days, No, 01/03/2020  Family History  CAD (coronary artery disease): Mother.  Diabetes mellitus type 2: Mother.  High blood pressure 09-AUG-2016 15:25:48<$>: Mother.  Mesothelioma: Father.  Primary malignant neoplasm of breast: Other.  Immunizations  Vaccine Date Status   influenza virus vaccine, inactivated 10/09/2019 Given   influenza virus vaccine, inactivated 10/12/2018 Given   influenza virus vaccine, inactivated 10/23/2017 Given   Health Maintenance  Health Maintenance  ???Pending?(in the next year)  ??? ??OverDue  ??? ? ? ?Advance Directive due??01/01/20??and every 1??year(s)  ??? ? ?  ?Alcohol Misuse Screening due??01/01/20??and every 1??year(s)  ??? ? ? ?Cognitive Screening due??01/01/20??and every 1??year(s)  ??? ? ? ?Geriatric Depression Screening due??01/01/20??and every 1??year(s)  ??? ??Due?  ??? ? ? ?Aspirin Therapy for CVD Prevention due??03/26/20??and every 1??year(s)  ??? ? ? ?Breast Cancer Screening (Senior Wellness) due??03/26/20??and every?  ??? ? ? ?Colorectal Screening (Senior Wellness) due??03/26/20??and every?  ??? ? ? ?Medicare Annual Wellness Exam due??03/26/20??and every 1??year(s)  ??? ? ? ?Pneumococcal Vaccine due??03/26/20??Variable frequency  ??? ? ? ?Pneumococcal Vaccine due??03/26/20??and every?  ??? ? ? ?Tetanus Vaccine due??03/26/20??and every 10??year(s)  ??? ? ? ?Zoster Vaccine due??03/26/20??and every 100??year(s)  ??? ??Due In Future?  ??? ? ? ?ADL Screening not due until??06/12/20??and every 1??year(s)  ??? ? ? ?Fall Risk Assessment not due until??01/01/21??and every 1??year(s)  ??? ? ? ?Functional Assessment not due until??01/01/21??and every 1??year(s)  ??? ? ? ?Obesity Screening not due until??01/01/21??and every 1??year(s)  ??? ? ? ?Hypertension Management-Blood Pressure not due until??02/25/21??and every 1??year(s)  ??? ? ? ?Hypertension Management-BMP not due until??02/25/21??and every 1??year(s)  ???Satisfied?(in the past 1 year)  ??? ??Satisfied?  ??? ? ? ?ADL Screening on??06/12/19.??Satisfied by Linh Younger RN  ??? ? ? ?Blood Pressure Screening on??02/26/20.??Satisfied by Norma Uriarte  ??? ? ? ?Body Mass Index Check on??02/26/20.??Satisfied by Norma Uriarte  ??? ? ? ?Bone Density Screening on??03/20/20.??Satisfied by Annamaria Turner.  ??? ? ? ?Depression Screening on??10/16/19.??Satisfied by Mita Gamboa LPN.  ??? ? ? ?Diabetes Screening on??02/26/20.??Satisfied by Cory Ernst Jr.  ??? ? ? ?Fall Risk Assessment on??01/30/20.??Satisfied by Norma Uriarte  ??? ? ? ?Functional Assessment on??01/29/20.??Satisfied by King TRACY,  Beverly AMEQZUITA  ??? ? ? ?Geriatric Depression Screening on??10/16/19.??Satisfied by Mita Gamboa LPN  ??? ? ? ?Hypertension Management-Blood Pressure on??02/26/20.??Satisfied by Norma Uriarte  ??? ? ? ?Influenza Vaccine on??10/09/19.??Satisfied by Verónica Brown  ??? ? ? ?Obesity Screening on??02/26/20.??Satisfied by Norma Uriarte  ?  Lab Results  Test Name Test Result Date/Time   Sodium Lvl 138 mmol/L 03/26/2020 10:35 CDT   Potassium Lvl 3.9 mmol/L 03/26/2020 10:35 CDT   Chloride 108 mmol/L (High) 03/26/2020 10:35 CDT   CO2 24 mmol/L 03/26/2020 10:35 CDT   Calcium Lvl 11.0 mg/dL (High) 03/26/2020 10:35 CDT   Glucose Lvl 141 mg/dL (High) 03/26/2020 10:35 CDT   BUN 7 mg/dL 03/26/2020 10:35 CDT   Creatinine 0.80 mg/dL 03/26/2020 10:35 CDT   eGFR-AA 92 mL/min 03/26/2020 10:35 CDT   eGFR-FLORI 76 mL/min (Low) 03/26/2020 10:35 CDT   Bili Total 0.3 mg/dL 03/26/2020 10:35 CDT   Bili Direct 0.1 mg/dL 03/26/2020 10:35 CDT   Bili Indirect 0.2 mg/dL 03/26/2020 10:35 CDT   AST 36 unit/L 03/26/2020 10:35 CDT   ALT 22 unit/L 03/26/2020 10:35 CDT   Alk Phos 104 unit/L 03/26/2020 10:35 CDT   Total Protein 7.5 gm/dL 03/26/2020 10:35 CDT   Albumin Lvl 2.9 gm/dL (Low) 03/26/2020 10:35 CDT   Globulin 4.60 gm/mL (High) 03/26/2020 10:35 CDT   A/G Ratio 0.6 ratio (Low) 03/26/2020 10:35 CDT   WBC 8.0 x10(3)/mcL 03/26/2020 10:35 CDT   RBC 4.17 x10(6)/[facility] 03/26/2020 10:35 CDT   Hgb 12.9 gm/dL 03/26/2020 10:35 CDT   Hct 40.1 % 03/26/2020 10:35 CDT   Platelet 255 x10(3)/mcL 03/26/2020 10:35 CDT   MCV 96.2 fL 03/26/2020 10:35 CDT   MCH 30.9 pg 03/26/2020 10:35 CDT   MCHC 32.2 gm/dL 03/26/2020 10:35 CDT   RDW 13.2 % 03/26/2020 10:35 CDT   MPV 9.5 fL 03/26/2020 10:35 CDT   Abs Neut 4.10 x10(3)/[facility] 03/26/2020 10:35 CDT   Neutro Auto 51 % 03/26/2020 10:35 CDT   Lymph Auto 36 % 03/26/2020 10:35 CDT   Mono Auto 12 % 03/26/2020 10:35 CDT   Eos Auto 1 % 03/26/2020 10:35 CDT   Abs Eos 0.1 x10(3)/[facility] 03/26/2020 10:35 CDT   Basophil Auto 1 %  03/26/2020 10:35 CDT   Abs Neutro 4.10 x10(3)/mcL 03/26/2020 10:35 CDT   Abs Lymph 2.8 x10(3)/mcL 03/26/2020 10:35 CDT   Abs Mono 0.9 x10(3)/mcL 03/26/2020 10:35 CDT   Abs Baso 0.0 x10(3)/mcL 03/26/2020 10:35 CDT   IG% 0 % 03/26/2020 10:35 CDT   IG# 0.0200 03/26/2020 10:35 CDT   Diagnostic Results  (03/20/2020 10:07 CDT XR Bone Density Complete)  * Final Report *  ?  Reason For Exam  Personal history of malignant neoplasm of breast;Long Term Drug Use  ?  Radiology Report  XR Bone Density Complete  ?  INDICATION: Long Term Drug Use  ?  COMPARISON: No relevant comparison studies available.  ?  FINDINGS: Obtained left radial bone mineral density is 0.432 g/sq cm.  The T score is 1.5.  ?  Left femoral bone mineral density is 0.987 g/sq cm with a T score of  -0.2. The right femoral bone mineral density is 0.942 g/sq cm with a T  score of -0.5. Mean femoral bone mineral density is 0.964 g/sq cm with  a T score of -0.3, in the normal range.  ?  IMPRESSION: Normal bone mineral density.  ?  Signature Line  Electronically Signed By: Mckinley Valentine MD  Date/Time Signed: 03/20/2020 10:17  ?  ?  This document has an image  ?  ?  Result type:???????  XR Bone Density Complete  Result date:???????  March 20, 2020 10:07 CDT  Result status:???????  Auth (Verified)  Result title:???????  XR Bone Density Complete  Performed by:???????  Mckinley Valentine MD on March 20, 2020 10:13 CDT  Verified by:???????  Mckinley Valentine MD on March 20, 2020 10:17 CDT  Encounter info:???????  [id number], [facility], Outpatient, 3/20/2020 - 3/20/2020  ?  ?     [1] XR Bone Density Complete; Mckinley Valentine MD 03/20/2020 10:07 CDT

## 2022-05-02 NOTE — HISTORICAL [facility]
This is a historical note converted from Jeanne. Formatting and pictures may have been removed.  Please reference [facility] for original formatting and attached multimedia. Patient in infusion today for calcitonin injection, K today 2.9, patient again refuses IVF with KCl additive.  ?  Will increased potassium chloride supplementation to 20 meq BID  ?  Frida Kinsey NP

## 2022-05-02 NOTE — HISTORICAL [facility] CERNER
This is a historical note converted from Jeanne. Formatting and pictures may have been removed.  Please reference Jeanne for original formatting and attached multimedia. Chief Complaint  Beast Cancer  ?  Problem List:  1. Recurrent metastatic breast cancer ? 4/15/2017?  2. Left breast cancer upper outer quadrant, diagnosed 8-  ?  Current Treatment  Faslodex monthly started on 6-6-2017??  Ibrance 125 mg p.o. daily with food for 21 days followed by 7 days off, Started 9-  Xgeva subcu monthly, started 11/3/2017  ?  Treatment History?  [place]otere/[place]tox[place] on 10/1/09 [place] [facility] [place] 2 [place] of [place])  -Aromasin inhibitor for 1 month. ?Patient did not continue this medication she says because of loss of insurance.?  Xeloda stopped on 6-8-17 2/2 painful mucositis.  Right thoracentesis on 5/2/17, which showed cytomorphology of adenocarcinoma, consistent with a breast primary.  History of Present Illness  ?  Left breast cancer upper outer quadrant?ER 90%, MO 15%, Her 2 +1 by IHC, ki 67- 25%  ?Surgery: left lumpectomy 8/12/2009. ?Pathology: 1.2 cm invasive ductal ca, grade 2. ?Lymphovascular invasion, positive margin  Surgery: left mastectomy ?9/8/2009. ?Pathology: ?invasive ductal carcinoma largest tumor nodule 1.1, smaller nodule 0.5 cm. ?DCIS + high grade lymphatic invasion: NO. Perineural invasion: NO.?  Margin: Positive to ink margin of largest 0 of 21 nodes ER +/MO +, ? HER2 1+ by IHC. Stage: unknown. ?Treatment: Taxotere/Cytoxan beginning on 10/1/09 and [place] [facility] [place] reports 2 rounds of chemo). ?Aromasin inhibitor for 1 month. ?Patient did not continue this medication she says because of loss of insurance.?  ?She presented to the ER on 4/15/17 with three-month history of left upper quadrant pain, nausea/vomiting and progressive shortness of breath. Past medical history significant for heavy smoking.?  ?CT imaging identified extensive  metastatic disease. She underwent a right thoracentesis on 5/2/17, which showed cytomorphology of adenocarcinoma, consistent with a breast primary.  ????  Recurrence:??CT chest/abd/pelvis 4/15/2017 -subcarinal node 2.1 x 3.0 cm, Right hilar lymph node 1.6 x 1.5 cm. Extensive left internal mammary lymphadenopathy ?3.6 x 4.4 cm enlarged internal mammary and left superior mediastinal lymph nodes as well. Right anterior mediastinal lymph nodes 1.0 x 1.5 cm. ?Right para-aortic lymph node ?1.2 x 2.2 cm. Multiple enlarged paracardiac/cardiophrenic lymph nodes 1.7 x 2.0 cm. Large right and moderate left pleural effusions. ?Multifocal right pleural soft tissue nodularity 2.4 x 1.7 cm. Numerous pulmonary nodules. Extensive osseous metastatic disease with involvement of the thoracic spine, sternum, and suspected involvement of several ribs. Innumerable hepatic metastatic lesions 4.0 x 3.6 cm and 3.1x 2.6 cm. Multiple small retroperitoneal nodes. Metastatic deposit in the retroperitoneum lateral to the right kidney 2.1 x 2.7 cm. Xeloda stopped on 6-8-17 2/2 painful mucositis. Started Faslodex on 06/07/2014, with dramatic turn around in her general health. ?  ?  Interval History:  3/13/19: This patient follows up for 1 month follow up. She has been stable on Ibrance and faslodex. She has no new or worsening complaints. Has a relatively good performance status. She has no intolerable side effects or toxicities to treatment.  Review of Systems  A complete 12 point ROS was performed in full with pertinent positives as described in interval history. Remainder of ROS done in full and are negative.  Physical Exam  Vitals & Measurements  T:?36.9? ?C (Oral)? HR:?83(Peripheral)? BP:?119/83? SpO2:?98%?  HT:?160?cm? WT:?62.3?kg? BMI:?24.34?  General:?well-developed well-nourished in no acute distress,  HEENT: PERRLA, EOMI, clear conjunctiva, eyelids normal, normocephalic, no mucositis, good dentition,  Neck: full range of motion, no  thyromegaly or lymphadenopathy  Respiratory:?clear to auscultation bilaterally  Cardiovascular:?regular rate and rhythm without murmurs, gallops or rubs  Gastrointestinal:?soft, non-tender, non-distended with normal bowel sounds, without masses to palpation  Genitourinary: no CVA tenderness to palpation  Musculoskeletal:?full range of motion of all extremities/spine without limitation or discomfort  Integumentary: no rashes or skin lesions present  Cognition and speech: Oriented, speech clear and coherent  Psychiatric: Cooperative, appropriate mood and affect  Neurologic: cranial nerves intact, no signs of peripheral neurological deficit, motor/sensory function intact  The National Cancer Staley Toxicity Score  The National Cancer Staley Toxicity Scores:  ECOG Performance Scale: 1 - Strenuous physical activity restricted; fully ambulatory and able to carry out light work.  Assessment/Plan  1.?Malignant neoplasm of upper-outer quadrant of left female breast?C50.412  ?Metastatic Left breast cancer.? Diagnosed 08/21/2009.?ER positive.? AR positive.? HER-2 negative.? Lumpectomy 08/12/2009.? Mastectomy 09/08/2009 (1.1 cm IDC; high-grade lymphatic invasion; margin positive). ?Adjuvant?chemotherapy with TC x2 (10/01/2009-01/2010).? Aromatase inhibitor only for 1 month (then, lost her insurance).  ?   ??2017: Metastatic Adenocarcinoma Breast with malignant pleural effusion, mets to liver, bones and peritoneum (4/2017)  -6/7/17: Faslodex started 06/07/2017. ?Ibrance added 09/07/2017.??  ->From?bedbound, extremely poor performance status,?experienced extremely dramatic recovery with Faslodex.  -12/20/17, pt continues to have response to chemotherapy. Mets are either stable or decreased in size.?  -3/23/18: Restaging scans ?Stable metastatic disease; CA 27.29 level continues to decline indicating response to treatment  -6/29/18:?CT C/A/P: No changes in hepatic and osseous metastatic disease since 3/23/18. Similar  moderate left pleural effusion. 3 mm right upper lobe nodule is not definitively seen before, but is nonspecific. Suggest attention on follow-up studies. Unchanged extensive skeletal metastatic involvement.  -09/04/2018: Nuclear medicine bone scan and restaging CT scans chest/abdomen/pelvis with contrast: Stable metastatic disease.  -CA 27.29 level consistently dropping  -09/12/2018: ECOG 3 overall, stable  -11/14/2018:?CT head with and without contrast (headaches): No intracranial metastasis  -12/12/2018: Restaging scans:?Stable metastatic disease;?unchanged?left pleural effusion  ?   2. ?Osseous metastatic disease  -continue Tramadol as prescribed prn for pain  ??????  ?  ?   Plan:  -Stable metastatic disease, without worsening,?on current regimen of Faslodex plus Ibrance.  ?   -Reasonably satisfactory functional status, varying between ECOG 1 and ECOG 2  ?   -Current treatment: Faslodex and Ibrance as endocrine therapy for metastatic breast cancer; has had very good response, followed by stable metastatic disease.?  ?   -Continue to follow CA 27.29 level monthly.  ?   -Continue Xgeva to prevent skeletal events from bone metastasis.  ?   -Calcium plus vitamin D twice a day with Xgeva?to prevent hypocalcemia  ?  -Apparently,?we are experiencing considerable?difficulty?drawing her blood from labs. ?She has to be stuck multiple times. ?Therefore, we have decided?to space out restaging scans?to every 6 months?unless?absolutely needed?at shorter time intervals. ?She completely agrees?enthusiastically.  -->Restage with contrast enhanced CT scans of chest/abdomen/pelvis and whole-body nuclear medicine bone scan in 6 months?(June 2019);?earlier,?if necessitated?by?symptoms, signs,?or worsening labs.  -Repeat imaging June 2019  -RTC in 4 weeks with CBC, CMP, CA27-29  ?   Problem List/Past Medical History  Ongoing  BMI 26.0-26.9,adult  Bone metastases  Breast ca  Cancer, metastatic  Hepatic neoplasm  secondary  Hypertension  Liver cancer, primary, with metastasis from liver to other site  New onset of headaches  Obesity  Terminal Illness  Tobacco user  Tobacco user  Unsteady gait  Historical  No qualifying data  Procedure/Surgical History  Mastectomy (2010)  Appendectomy;  back surgery    Mastectomy   Medications  amlodipine 5 mg oral tablet, 5 mg= 1 tab(s), Oral, Daily  calcium (as carbonate)-vitamin D 500 mg-400 intl units oral tablet, 1 tab(s), Oral, BID, 11 refills  dexamethasone 1 mg oral tablet, 1 tab, Oral, Daily  haloperidol 1 mg oral tablet, 1 mg= 1 tab(s), Oral, q4hr, PRN  Ibrance 125 mg oral capsule, 125 mg= 1 cap(s), Oral, Daily, 4 refills  PANTOPRAZOLE SODIUM 40 MG TBEC, 40 mg= 1 tab(s), Oral, Daily  Percocet 5/325 oral tablet, 1 tab(s), Oral, q6hr, PRN,? ?Not taking  Protonix 20 mg ORAL enteric coated tablet, 20 mg= 1 tab(s), Oral, Daily,? ?Not Taking per Prescriber  QUETIAPINE FUMARATE 25 MG TABS, 25 mg= 1 tab(s), Oral, qPM,? ?Not taking  SENNA 8.6 MG TABS, 17.2 mg= 2 tab(s), Oral, Daily  Seroquel 50 mg oral tablet, 50 mg= 1 tab(s), Oral  traMADol 50 mg oral tablet, 50 mg= 1 tab(s), Oral, q6hr, PRN  Xanax 0.5 mg oral tablet, 0.5 mg= 1 tab(s), Oral, TID  Allergies  HYDROcodone?(altered mental state)  Social History  Alcohol  Past, 2018  Never, 04/15/2017  Employment/School  Retired, 2018  Retired, 2018  Home/Environment  Lives with Alone., 2018  Lives with Alone., 2018  Nutrition/Health  Regular, 2018  Substance Abuse  Never, 2018  Never, 04/15/2017  Tobacco  10 or more cigarettes (1/2 pack or more)/day in last 30 days, No, 2019  10 or more cigarettes (1/2 pack or more)/day in last 30 days, No, 01/15/2019  Family History  CAD (coronary artery disease): Mother.  Diabetes mellitus type 2: Mother.  High blood pressure 09-AUG-2016 15:25:48<$>: Mother.  Mesothelioma: Father.  Primary malignant neoplasm of breast: Other.  Immunizations  Vaccine  Date Status   influenza virus vaccine, inactivated 10/12/2018 Given   influenza virus vaccine, inactivated 10/23/2017 Given   Health Maintenance  Health Maintenance  ???Pending?(in the next year)  ??? ??Due?  ??? ? ? ?Advance Directive due??03/12/19??and every 1??year(s)  ??? ? ? ?Alcohol Misuse Screening due??03/12/19??and every 1??year(s)  ??? ? ? ?Aspirin Therapy for CVD Prevention due??03/12/19??and every 1??year(s)  ??? ? ? ?Bone Density Screening due??03/12/19??Variable frequency  ??? ? ? ?Breast Cancer Screening (Senior Wellness) due??03/12/19??and every?  ??? ? ? ?Cognitive Screening due??03/12/19??and every 1??year(s)  ??? ? ? ?Colorectal Screening (Senior Wellness) due??03/12/19??and every?  ??? ? ? ?Functional Assessment due??03/12/19??and every 1??year(s)  ??? ? ? ?Geriatric Depression Screening due??03/12/19??and every 1??year(s)  ??? ? ? ?Hypertension Management-Education due??03/12/19??and every 1??year(s)  ??? ? ? ?Lung Cancer Screening due??03/12/19??and every 1??year(s)  ??? ? ? ?Pneumococcal Vaccine due??03/12/19??Variable frequency  ??? ? ? ?Pneumococcal Vaccine due??03/12/19??and every?  ??? ? ? ?Tetanus Vaccine due??03/12/19??and every 10??year(s)  ??? ? ? ?Zoster Vaccine due??03/12/19??and every 100??year(s)  ??? ??Due In Future?  ??? ? ? ?Smoking Cessation not due until??09/12/19??and every 1??year(s)  ??? ? ? ?Fall Risk Assessment not due until??02/07/20??and every 1??year(s)  ??? ? ? ?Hypertension Management-Blood Pressure not due until??02/12/20??and every 1??year(s)  ??? ? ? ?ADL Screening not due until??02/12/20??and every 1??year(s)  ??? ? ? ?Obesity Screening not due until??02/12/20??and every 1??year(s)  ??? ? ? ?Hypertension Management-BMP not due until??03/11/20??and every 1??year(s)  ???Satisfied?(in the past 1 year)  ??? ??Satisfied?  ??? ? ? ?ADL Screening on??02/12/19.??Satisfied by Mireille Langston LPN  ??? ? ? ?Blood Pressure Screening on??02/12/19.??Satisfied by [place]  LPN, Beonka S  ??? ? ? ?Body Mass Index Check on??02/12/19.??Satisfied by [place] LPN, Beonka S  ??? ? ? ?Depression Screening on??01/15/19.??Satisfied by Norma Uriarte  ??? ? ? ?Diabetes Screening on??03/12/19.??Satisfied by Paulina Luu  ??? ? ? ?Fall Risk Assessment on??02/07/19.??Satisfied by Beverly Arevalo RN  ??? ? ? ?Hypertension Management-BMP on??03/12/19.??Satisfied by Paulina Luu  ??? ? ? ?Influenza Vaccine on??10/12/18.??Satisfied by [place] LPN, Beonka S  ??? ? ? ?Obesity Screening on??02/12/19.??Satisfied by [place] LPN, Beonka S  ??? ? ? ?Smoking Cessation on??09/12/18.??Satisfied by [place] LPN, Beonka S  ?  ?  Lab Results  Test Name Test Result Date/Time   Potassium Lvl 3.7 mmol/L 03/12/2019 09:22 CDT   Chloride 110 mmol/L (High) 03/12/2019 09:22 CDT   CO2 26 mmol/L 03/12/2019 09:22 CDT   Calcium Lvl 9.6 mg/dL 03/12/2019 09:22 CDT   Glucose Lvl 140 mg/dL (High) 03/12/2019 09:22 CDT   BUN 12 mg/dL 03/12/2019 09:22 CDT   Creatinine 0.90 mg/dL 03/12/2019 09:22 CDT   eGFR-AA 81 mL/min (Low) 03/12/2019 09:22 CDT   eGFR-FLORI 67 mL/min (Low) 03/12/2019 09:22 CDT   Bili Total 0.3 mg/dL 03/12/2019 09:22 CDT   AST 18 unit/L 03/12/2019 09:22 CDT   ALT 28 unit/L 03/12/2019 09:22 CDT   Alk Phos 99 unit/L 03/12/2019 09:22 CDT   Albumin Lvl 2.8 gm/dL (Low) 03/12/2019 09:22 CDT   WBC 2.4 x10(3)/mcL (Low) 03/12/2019 09:22 CDT   Hgb 11.9 gm/dL (Low) 03/12/2019 09:22 CDT   Hct 34.2 % (Low) 03/12/2019 09:22 CDT   Platelet 130 x10(3)/mcL 03/12/2019 09:22 CDT   .8 fL (High) 03/12/2019 09:22 CDT   Neutro Auto 60 x10(3)/mcL 03/12/2019 09:22 CDT   Mono Auto 5 % 03/12/2019 09:22 CDT

## 2022-05-02 NOTE — HISTORICAL [facility] CERNER
This is a historical note converted from Jeanne. Formatting and pictures may have been removed.  Please reference Jeanne for original formatting and attached multimedia. Chief Complaint  Breast Cancer  History of Present Illness  Problem List:  1. Recurrent metastatic breast cancer ? 4/15/2017?  2. Left breast cancer upper outer quadrant, diagnosed 8-  -Treatment: ?Taxotere/Cytoxan beginning on 10/1/09 and [place] in [place] [facility] [place]patient reports 2 rounds of chemo)  -Aromasin inhibitor for 1 month. ?Patient did not continue this medication she says because of loss of insurance.?  ?  Current Treatment:  1. Arimidex 1mg PO daily  ??  Treatment History?  Xeloda stopped on 6-8-17 2/2 painful mucositis.  Right thoracentesis on 5/2/17, which showed cytomorphology of adenocarcinoma, consistent with a breast primary.  Faslodex 06/06/2017-01/09/2020, stopped d/t bone met progression  Ibrance 09/20/2017-01/09/2020, stopped d/t bone met progression  Xgeva held/discontinued 03/2019 d/t ONJ  ?   Clinical History  1.? Left breast cancer upper outer quadrant?ER 90%, NJ 15%, Her 2 +1 by IHC, ki 67- 25%  ?Surgery: left lumpectomy 8/12/2009. ?Pathology: 1.2 cm invasive ductal ca, grade 2. ?Lymphovascular invasion, positive margin  Surgery: left mastectomy ?9/8/2009. ?Pathology: ?invasive ductal carcinoma largest tumor nodule 1.1, smaller nodule 0.5 cm. ?DCIS + high grade lymphatic invasion: NO. Perineural invasion: NO.?  Margin: Positive to ink margin of largest 0 of 21 nodes ER +/NJ +, ? HER2 1+ by IHC. Stage: unknown. ?Treatment: Taxotere/Cytoxan beginning on 10/1/09 and ending in January 2010 [place] [facility] [place] ?(patient reports 2 rounds of chemo). ?Aromasin inhibitor for 1 month. ?Patient did not continue this medication she says because of loss of insurance.?  ?She presented to the ER on 4/15/17 with three-month history of left upper quadrant pain, nausea/vomiting and progressive shortness of breath. Past medical history  significant for heavy smoking.?  ?CT imaging identified extensive metastatic disease. She underwent a right thoracentesis on 5/2/17, which showed cytomorphology of adenocarcinoma, consistent with a breast primary.  ????  Recurrence:??CT chest/abd/pelvis 4/15/2017 -subcarinal node 2.1 x 3.0 cm, Right hilar lymph node 1.6 x 1.5 cm. Extensive left internal mammary lymphadenopathy ?3.6 x 4.4 cm enlarged internal mammary and left superior mediastinal lymph nodes as well. Right anterior mediastinal lymph nodes 1.0 x 1.5 cm. ?Right para-aortic lymph node ?1.2 x 2.2 cm. Multiple enlarged paracardiac/cardiophrenic lymph nodes 1.7 x 2.0 cm. Large right and moderate left pleural effusions. ?Multifocal right pleural soft tissue nodularity 2.4 x 1.7 cm. Numerous pulmonary nodules. Extensive osseous metastatic disease with involvement of the thoracic spine, sternum, and suspected involvement of several ribs. Innumerable hepatic metastatic lesions 4.0 x 3.6 cm and 3.1x 2.6 cm. Multiple small retroperitoneal nodes. Metastatic deposit in the retroperitoneum lateral to the right kidney 2.1 x 2.7 cm. Xeloda stopped on 6-8-17 2/2 painful mucositis. Started Faslodex on 06/07/2014, with dramatic turn around in her general health. ?  ?   Interval History:  ?   Patient presents today for a scheduled follow-up, accompanied by her daughter.?She reports compliance with daily Arimidex. She states that she has not been taking her calcium and vitamin D as instructed?d/t hypercalcemia. She reports compliance with Xarelto daily for PE. She endorses no new problems or concerns today.?Her daughter does voice concerns regarding her BP medications. She states that [facility] her mother on BP meds and now does not want to fill them d/t them not being related to her cancer diagnosis. Denies HA, fever, night sweats, SOB, CP, edema, neuropathy. Denies N/V/C/D, abdominal pain, change in bowel/bladder habits, blood in the urine/stool. Appetite  good, weight stable, denies unintentional weight loss/gain. Labs today reviewed with patient and daughter. K: 3.0, Ca: 10.6 corrected at 10.9, patient continues to be asymptomatic, refuses IVF today. WBC: 6.5, ANC: 3360, RBC: 3.55, H/H: 12.4/37.9, Plts: 230. Reviewed patient medications. She denies needing any refills today. Denies questions/needs/concerns.  ?  Review of Systems  A complete 12 point ROS was performed in full with pertinent positives described in interval history. Remainder of ROS done in full and are negative.?  Physical Exam  Vitals & Measurements  T:?37.1? ?C (Oral)? HR:?90(Peripheral)? RR:?18? BP:?137/87? SpO2:?91%?  HT:?160?cm? WT:?66.2?kg? BMI:?25.86?  Vital Signs Reviewed  General: AAO, NAD noted  Eye: PERRLA, EOMI  Neuro: Normal mentation, strength 5/5 on all 4 extremities, no sensory deficits  HET: Normocephalic, adequate hearing,?no oral ulcers, mucous membranes pink/moist/intact, no pharyngeal erythema, poor dentition  Neck: Supple, non-tender, no lymphadenopathy  Respiratory: Non-labored breathing, symmetrical chest wall expansion, BBS CTA, no crackles/wheezing/rhonchi noted  Cardiovascular: S1S2 noted, RRR, no M/R/G, pulses equal in all extremities, normal peripheral perfusion  Abdomen: Soft, non-tender, non-distended, BS+, no hepatosplenomegaly  Musculoskeletal: presents in a wheelchair  Integumentary: No rashes, no bruises or purpura, warm and dry, normal for ethnicity  Neurologic: No focal deficits, Cranial nerves II-XII are grossly intact.  Cognition and Speech: Speech clear and coherent, functional cognition intact  Psychiatric: Cooperative, appropriate mood and affect for situation, normal judgement, no suicidal or homicidal ideations  ?  ?   The National Cancer [place] Toxicity Scores:  ?   ECOG Performance Scale: 3 - Capable of only limited selfcare; confined to bed or chair more than 50% of waking hours.  ?  Assessment/Plan  1.?Metastatic breast cancer?C50.189  ? To  summarize:  #Left breast cancer.? Diagnosed 08/21/2009. ?ER positive. ?NE positive. ?HER-2 negative.  Lumpectomy 08/12/2009.? Mastectomy 09/08/2009?(1.1 cm tumor; high-grade;?lymphatic invasion; margin positive).  Adjuvant TC x2?(10/01/2009-01/2010)  Adjuvant?aromatase inhibitor?for only 1 month (lost medical insurance)  04/2017: Metastatic disease:?Malignant pleural effusion, liver metastasis, bone metastasis, peritoneal metastasis  06/07/2017:?Faslodex started  09/07/2017:?Palbociclib added  Dramatic response?with endocrine therapy?(dramatic improvement in performance status)  -12/20/2019:?Worsening of metastatic disease (scans, tumor marker)?(minimally worsened bone metastasis;?at least 8?new peripheral?noncalcified lung nodules right lower lung, 3-28 mm,?microlobulated and spiculated, suspicious for metastasis), etc.  ?   Plan:  PIK3CA?mutation?testing is pending.  ?   Continue endocrine therapy until progression or unacceptable toxicity  ?   Since disease has progressed while?on CDK 4/6 inhibitor therapy (Ibrance), therefore,?no indication?of additional line of therapy with another CDK 4/6-containing regimen.  ?   If no clinical benefit after up to 3 sequential endocrine therapy regimens, or if symptomatic visceral disease, then chemotherapy  ?   Monitor CA 27.29 level?every month.  ?   Check for PIK3CA mutation (if positive, then fulvestrant plus alpelisib is another category 1 treatment option).  Result is pending.  ?   Test for germline BRCA1/2 mutation.? If positive, then treatment with PARP inhibitors will be an option (olaparib; talazoparib).  This is ongoing.  ?  Restage with contrast-enhanced CT scans of chest, abdomen, and pelvis, and whole-body nuclear medicine bone scan in 3 months (April 2020).  ?  Patient remains under the care of home hospice?to help her with her daily needs.  ?  -Continue to hold Calcium at this time, today patient refusing IVF d/t having to be stuck for an IV. We cannot give  denosumab d/t ONJ.  -C/W Ca 27.29 monthly monitoring  -Restaging CT C/A/P + BS scheduled: 2020  -Genetics F/U appointment scheduled: 2020  -C/W Hospice services per the patients request, Shae our Patient?Navigator will reach out to [facility] to get more information on BP meds per the patients request  ?  2.?Bone metastases?C79.51  #Osteonecrosis of jaw; Xgeva held from 2019 ?  Xgeva held from 2019?for osteonecrosis of the jaw.  Patient follows up with Dr. Jean Claude Ley, oral surgeon.  Apparently, because of her?being on chronic anticoagulation,?oral surgery was avoided. ?She continues to complain of?pain when eating.  ?   -We will not re-initiate denosumab d/t ONJ  ?  3.?Pulmonary embolism?I26.99  #06/10/2019:?Incidentally detected PE  Continue?indefinite anticoagulation?for pulmonary emboli incidentally?detected?on chest CT dated 06/10/2019.  Reports no bleeding.  ?   -C/W Xarelto 15mg PO daily  ?  4.?Long term current use of aromatase inhibitor?Z79.811  ?Due to progression noted on scans 2019, Arimidex 1mg PO daily initiated  ?  -C/W Arimidex 1mg PO daily  ?  5. Hypokalemia  K today: 3.0  ?  -Start KCL?20 meq?PO daily  ?  ?  RTC 4 weeks with?MD with labs: CBC, CMP, Ca 27.29  ?  Discussed POC with patient, all questions answered. Instructed to call clinic for any issues or with any questions PRN, patient verbalizes understanding.  ?   Frida Kinsey, APRN, FNP-C   Problem List/Past Medical History  Ongoing  Bone metastases  Metastatic breast cancer  Historical  Breast ca  Cancer, metastatic  Hepatic neoplasm secondary  New onset of headaches  Obesity  Terminal Illness  Unsteady gait  Procedure/Surgical History  Drainage of Right Pleural Cavity with Drainage Device, Percutaneous Approach (2017)  Thoracentesis, needle or catheter, aspiration of the pleural space; with imaging guidance (2017)  Mastectomy ()  Appendectomy;  back surgery    Mastectomy    Medications  acetaminophen-codeine #4, Oral, q6hr  amLODIPine 5 mg oral tablet, 5 mg= 1 tab(s), Oral, Daily  Arimidex 1 mg oral tablet, 1 mg= 1 tab(s), Oral, Daily, 3 refills  calcium (as carbonate)-vitamin D 500 mg-400 intl units oral tablet, 1 tab(s), Oral, BID, 11 refills  dexamethasone 1 mg oral tablet, 1 mg= 1 tab(s), Oral, BID  haloperidol 1 mg oral tablet, 1 mg= 1 tab(s), Oral, TID  Ibrance 75 mg oral capsule, 75 mg= 1 cap(s), Oral, Daily  LORazepam 1 mg oral tablet, 1 mg= 1 tab(s), Oral, TID  PANTOPRAZOLE SODIUM 40 MG TBEC, 40 mg= 1 tab(s), Oral, Daily  polyethylene glycol 3350 oral powder for reconstitution  QUEtiapine 25 mg oral tablet, 25 mg= 1 tab(s), Oral, TID  SENNA 8.6 MG TABS, 17.2 mg= 2 tab(s), Oral, Daily  traMADol 50 mg oral tablet, 50 mg= 1 tab(s), Oral, q6hr, PRN  Xarelto 15mg Tablet, 15 mg= 1 tab(s), Oral, qPM  Allergies  HYDROcodone?(altered mental state)  Social History  Abuse/Neglect  No, 01/10/2020  No, 01/09/2020  No, 01/03/2020  Alcohol  Past, 12/17/2018  Never, 04/15/2017  Employment/School  Retired, 05/03/2018  Exercise  Exercise frequency: Daily. Exercise type: Walking., 10/16/2019  Home/Environment  Lives with Children. Living situation: Home/Independent. Oxygen, Walker/Cane, Wheelchair, TV/Computer concerns: No. Single family house, Risks in environment: Pets/Animal exposure., 10/16/2019  Nutrition/Health  Regular, 05/03/2018  Sexual  Sexual orientation: Straight or heterosexual. Gender Identity Identifies as female., 03/12/2019  Spiritual/Cultural  Orthodoxy, 10/16/2019  Substance Use  Never, 04/15/2017  Tobacco  10 or more cigarettes (1/2 pack or more)/day in last 30 days, No, 01/10/2020  10 or more cigarettes (1/2 pack or more)/day in last 30 days, No, 01/09/2020  10 or more cigarettes (1/2 pack or more)/day in last 30 days, No, 01/03/2020  Family History  CAD (coronary artery disease): Mother.  Diabetes mellitus type 2: Mother.  High blood pressure 09-AUG-2016 15:25:48<$>:  Mother.  Mesothelioma: Father.  Primary malignant neoplasm of breast: Other.  Immunizations  Vaccine Date Status   influenza virus vaccine, inactivated 10/09/2019 Given   influenza virus vaccine, inactivated 10/12/2018 Given   influenza virus vaccine, inactivated 10/23/2017 Given   Health Maintenance  Health Maintenance  ???Pending?(in the next year)  ??? ??OverDue  ??? ? ? ?Advance Directive due??01/01/20??and every 1??year(s)  ??? ? ? ?Geriatric Depression Screening due??01/01/20??and every 1??year(s)  ??? ??Due?  ??? ? ? ?Alcohol Misuse Screening due??01/01/20??and every 1??year(s)  ??? ? ? ?Cognitive Screening due??01/01/20??and every 1??year(s)  ??? ? ? ?Functional Assessment due??01/01/20??and every 1??year(s)  ??? ? ? ?Aspirin Therapy for CVD Prevention due??01/27/20??and every 1??year(s)  ??? ? ? ?Bone Density Screening due??01/27/20??Variable frequency  ??? ? ? ?Breast Cancer Screening ([facility]) due??01/27/20??and every?  ??? ? ? ?Colorectal Screening ([facility]) due??01/27/20??and every?  ??? ? ? ?Pneumococcal Vaccine due??01/27/20??Variable frequency  ??? ? ? ?Pneumococcal Vaccine due??01/27/20??and every?  ??? ? ? ?Tetanus Vaccine due??01/27/20??and every 10??year(s)  ??? ? ? ?Zoster Vaccine due??01/27/20??and every 100??year(s)  ??? ??Due In Future?  ??? ? ? ?ADL Screening not due until??06/12/20??and every 1??year(s)  ??? ? ? ?Fall Risk Assessment not due until??01/01/21??and every 1??year(s)  ??? ? ? ?Obesity Screening not due until??01/01/21??and every 1??year(s)  ??? ? ? ?Hypertension Management-Blood Pressure not due until??01/09/21??and every 1??year(s)  ??? ? ? ?Hypertension Management-BMP not due until??01/09/21??and every 1??year(s)  ???Satisfied?(in the past 1 year)  ??? ??Satisfied?  ??? ? ? ?ADL Screening on??06/12/19.??Satisfied by Linh Younger RN  ??? ? ? ?Blood Pressure Screening on??01/10/20.??Satisfied by Kaelyn Osorio RN  ??? ? ? ?Body Mass Index Check  on??01/10/20.??Satisfied by Kaelyn Osorio RN  ??? ? ? ?Depression Screening on??10/16/19.??Satisfied by Mita Gamboa LPN  ??? ? ? ?Diabetes Screening on??01/10/20.??Satisfied by Cory Ernst Jr.  ??? ? ? ?Fall Risk Assessment on??01/10/20.??Satisfied by Kaelyn Osorio RN  ??? ? ? ?Geriatric Depression Screening on??10/16/19.??Satisfied by Mita Gamboa LPN  ??? ? ? ?Hypertension Management-Blood Pressure on??01/10/20.??Satisfied by Kaelyn Osorio RN  ??? ? ? ?Influenza Vaccine on??10/09/19.??Satisfied by Verónica Brown  ??? ? ? ?Obesity Screening on??01/10/20.??Satisfied by Kaelyn Osorio RN  ?  Lab Results  Test Name Test Result Date/Time Comments   Sodium Lvl 141 mmol/L 01/28/2020 08:05 CST    Potassium Lvl 3.0 mmol/L (Low) 01/28/2020 08:05 CST    Chloride 109 mmol/L (High) 01/28/2020 08:05 CST    CO2 27 mmol/L 01/28/2020 08:05 CST    Calcium Lvl 10.6 mg/dL (High) 01/28/2020 08:05 CST    Glucose Lvl 100 mg/dL 01/28/2020 08:05 CST    BUN 6 mg/dL (Low) 01/28/2020 08:05 CST    Creatinine 0.60 mg/dL 01/28/2020 08:05 CST    eGFR-AA >105 mL/min 01/28/2020 08:05 CST    eGFR-FLORI >105 mL/min 01/28/2020 08:05 CST    Bili Total 0.4 mg/dL 01/28/2020 08:05 CST    Bili Direct 0.1 mg/dL 01/28/2020 08:05 CST    Bili Indirect 0.3 mg/dL 01/28/2020 08:05 CST    AST 26 unit/L 01/28/2020 08:05 CST    ALT 20 unit/L 01/28/2020 08:05 CST    Alk Phos 80 unit/L 01/28/2020 08:05 CST    Total Protein 6.7 gm/dL 01/28/2020 08:05 CST    Albumin Lvl 2.9 gm/dL (Low) 01/28/2020 08:05 CST    Globulin 3.80 gm/mL (High) 01/28/2020 08:05 CST    A/G Ratio 0.8 ratio (Low) 01/28/2020 08:05 CST    WBC 6.5 x10(3)/mcL 01/28/2020 08:05 CST    RBC 3.55 x10(6)/mcL (Low) 01/28/2020 08:05 CST    Hgb 12.4 gm/dL 01/28/2020 08:05 CST    Hct 37.9 % 01/28/2020 08:05 CST    Platelet 230 x10(3)/mcL 01/28/2020 08:05 CST no platelet clumps seen on slide   .8 fL (High) 01/28/2020 08:05 CST    MCH 34.9 pg (High) 01/28/2020 08:05 CST    MCHC  32.7 gm/dL 01/28/2020 08:05 CST    RDW 13.2 % 01/28/2020 08:05 CST    MPV 10.1 fL 01/28/2020 08:05 CST    Abs Neut 3.36 x10(3)/mcL 01/28/2020 08:05 CST    Neutro Auto 52 % 01/28/2020 08:05 CST    Lymph Auto 33 % 01/28/2020 08:05 CST    Mono Auto 11 % 01/28/2020 08:05 CST    Eos Auto 1 % 01/28/2020 08:05 CST    Abs Eos 0.1 x10(3)/[facility] 01/28/2020 08:05 CST    Basophil Auto 2 % 01/28/2020 08:05 CST    Abs Neutro 3.36 x10(3)/[facility] 01/28/2020 08:05 CST    Abs Lymph 2.2 x10(3)/[facility] 01/28/2020 08:05 CST    Abs Mono 0.7 x10(3)/[facility] 01/28/2020 08:05 CST    Abs Baso 0.1 x10(3)/[facility] 01/28/2020 08:05 CST    IG% 1 % 01/28/2020 08:05 CST    IG# [id number] 01/28/2020 08:05 CST

## 2022-05-02 NOTE — HISTORICAL [facility] C[facility]
This is a historical note converted from [place]. Formatting and pictures may have been removed.  Please reference Jeanne for original formatting and attached multimedia. Received communication from [facility],?Dr. Jean Claude Ley. ?Apparently,?the patient is suffering from?osteonecrosis of the jaw.? Apparently,?she has been experiencing jaw pain?since extraction of teeth?1/2 years ago?(September/October 2017).? Dr. Ley is planning?debridement of the exposed bone?and has requested us to hold Xgeva.  We were never informed of these symptoms by the patient.? We will hold Xgeva?and let Dr. Ley do the needful.

## 2022-05-02 NOTE — HISTORICAL [facility]ER
This is a historical note converted from Jeanne. Formatting and pictures may have been removed.  Please reference Jeanne for original formatting and attached multimedia. History of Present Illness  ?  History of Present Illness  1. Recurrent metastatic breast cancer ? 4/15/2017?  2. Left breast cancer upper outer quadrant, diagnosed 8-  -Treatment: ?Taxotere/Cytoxan beginning on 10/1/09 and ending in January 2010 at [facility] (patient reports 2 rounds of chemo)  -Aromasin inhibitor for 1 month. ?Patient did not continue this medication she says because of loss of insurance.?  ??  Treatment History?  Xeloda stopped on 6-8-17 2/2 painful mucositis.  Right thoracentesis on 5/2/17, which showed cytomorphology of adenocarcinoma, consistent with a breast primary.  ?   Reason for visit:  Follow-up for?recurrent metastatic breast cancer  ????  Clinical History  1.?Left breast cancer upper outer quadrant?ER 90%, OK 15%, Her 2 +1 by IHC, ki 67- 25%  ?Surgery: left lumpectomy 8/12/2009. ?Pathology: 1.2 cm invasive ductal ca, grade 2. ?Lymphovascular invasion, positive margin  Surgery: left mastectomy ?9/8/2009. ?Pathology: ?invasive ductal carcinoma largest tumor nodule 1.1, smaller nodule 0.5 cm. ?DCIS + high grade lymphatic invasion: NO. Perineural invasion: NO.?  Margin: Positive to ink margin of largest 0 of 21 nodes ER +/OK +, ? HER2 1+ by IHC. Stage: unknown. ?Treatment: Taxotere/Cytoxan beginning on 10/1/09 and ending in January 2010 at [facility] ?(patient reports 2 rounds of chemo). ?Aromasin inhibitor for 1 month. ?Patient did not continue this medication she says because of loss of insurance.?  ?She presented to the ER on 4/15/17 with three-month history of left upper quadrant pain, nausea/vomiting and progressive shortness of breath. Past medical history significant for heavy smoking.?  ?CT imaging identified extensive metastatic disease. She underwent a right thoracentesis on 5/2/17, which showed cytomorphology of  adenocarcinoma, consistent with a breast primary.  ????  Recurrence:??CT chest/abd/pelvis 4/15/2017 -subcarinal node 2.1 x 3.0 cm, Right hilar lymph node 1.6 x 1.5 cm. Extensive left internal mammary lymphadenopathy ?3.6 x 4.4 cm enlarged internal mammary and left superior mediastinal lymph nodes as well. Right anterior mediastinal lymph nodes 1.0 x 1.5 cm. ?Right para-aortic lymph node ?1.2 x 2.2 cm. Multiple enlarged paracardiac/cardiophrenic lymph nodes 1.7 x 2.0 cm. Large right and moderate left pleural effusions. ?Multifocal right pleural soft tissue nodularity 2.4 x 1.7 cm. Numerous pulmonary nodules. Extensive osseous metastatic disease with involvement of the thoracic spine, sternum, and suspected involvement of several ribs. Innumerable hepatic metastatic lesions 4.0 x 3.6 cm and 3.1x 2.6 cm. Multiple small retroperitoneal nodes. Metastatic deposit in the retroperitoneum lateral to the right kidney 2.1 x 2.7 cm. Xeloda stopped on 6-8-17 2/2 painful mucositis. Started Faslodex on 06/07/2014, with dramatic turn around in her general health. ?  ?  9/07/2017?Started concurrent Ibrance 125mg po daily. ?With Faslodex, her general health has made a dramatic turnaround. ?From being bedbound until 2 or 3 months back, she became up and about, and bubbling with energy. ?Enjoying life. ?Enjoying her familys company as well as reunion. ?She mows her own glass in her backyard. ?Appetite is great. ?The only symptom she has is pain in hips and lower back, 4 out on a scale of 1-10, if 10 could be the worst. ?Takes tramadol only sparingly. ?Before starting treatment, she had lost 30 pounds in last 6 months, but says that she has regained 15 pounds back. ?Denies neurological symptoms chest pain shortness of breath abdominal pain nausea vomiting etc.  ?  Presents for a follow-up visit. ?Extremely well. ?Bubbling with energy. ?Excellent appetite. ?No significant aches or pains. ?9/07/2017, CA 27.29 level was greater than 450.  ?It was 4999 on 05/16/2017. CEA level 10.2. ?CBC unremarkable. ?CA 15-3 level elevated to 1836. ?Whole-body bone scan on 09/15/2017 showed diffuse hypermetabolic activity throughout the spine, bilateral ribs, pelvis, and proximal femora, possibly with some increased activity in the calvarium as well, all findings consistent with widespread skeletal metastasis. ?Follow-up CT scans of abdomen pelvis on 09/15/2017 showed a few scattered subcentimeter lung nodules in the right lung which were not seen previously but could have been masked secondary to large pleural effusion on previous exam; a small right pleural effusion; left lung nodules are not seen on the current exam; left pleural effusion has increased; widespread liver metastasis appears smaller; widespread skeletal metastases were again noted, worse at T6 and T7; stable mesenteric nodules.??  ?   ?  Interval History?  11/14/18:? Patient presents for scheduled follow-up visit.?At her last visit (held for 1 week secondary to chemotherapy-induced neutropenia. ANC recovered to 1400?and treatment was?resumed on 10/24/18. Today is her last day of this cycle.?She?is due to restart on Thursday of next week.?Her ANC today is 760.?She is asymptomatic and afebrile.?Her biggest complaints today are regarding weakness and fatigue. She reports prior to her last visit even, she began to feel increasingly weak.?She does report headaches?in the afternoons around the same time every day?in the frontal lobes.? Does not?require medication for?the headaches. Reports?they are short-lived.?States the headaches have been?ongoing for a couple of months?and changing in severity, stating?presently?are less severe. Also reporting some issues with balance; ambulating with cane steadily. Denies visual disturbances?or concerning?strokelike symptoms.  ?   No intolerable treatment related side effects. Denies cough,?SOB,?sore throat, dysphasia?CP,?nausea, vomiting, diarrhea, abdominal pain. No  fever or recent infection.  ?   12/17/2018:  -11/14/2018: CT head with and without contrast (headaches): No intracranial metastasis.  -12/12/2018: Restaging whole-body nuclear medicine bone scan: No metastatic disease.  -12/20/2018: Restaging CT C/A/P with contrast: Unchanged left pleural effusion volume in left lower lung lobe considerable atelectasis; no lung metastasis or thoracic adenopathy; stable hepatic metastatic involvement; extensive skeletal metastatic involvement, unchanged.  -12/13/2018: CA 27.29 level 90.1, continues to decline (was 428 in 01/2018).? CMP unremarkable.? CBC unremarkable.? Hemoglobin 12.5 g%.? Has been variably neutropenic, mostly in the mild range, for last many months, intermittently, as side effect of Ibrance.  Presents for follow-up visit, accompanied by a female family member.? Always fatigued?but?functional status varies between ECOG 1 and ECOG 2.? No consistent decline. ?Appetite is fair.? No?fevers, chills, chest pain, cough, dyspnea,?unusual headaches, focal neurological symptoms, abdominal pain, nausea, vomiting, GI bleeding, etc.  ?   06/18/2019:  -Admitted [facility] 06/10/2019-06/14/2019.? Sore throat for 1 month.? Admitted with weakness, fatigue.? Enlarged left submandibular lymph nodes, tender to palpation.? Sore throat with difficulty swallowing.? Temperature 38.2.? Heart rate 120.? Mild cytopenias.? CTs showed right middle and lower lobe PE.? Started on Lovenox, then transitioned to Coumadin.? Vancomycin plus Zosyn for sepsis.? Cultures negative.? Patient improved.? Never complained of chest pain or dyspnea.? [facility].  -06/10/2019: CT chest with contrast: Right middle and lower lobe pulmonary emboli; continued bilateral pleural effusions; widespread bone metastasis with similar overall appearance since 12/12/2018.  06/10/2019: CT soft tissues of the neck with contrast: No cervical lymphadenopathy; diffuse bone metastasis.  -06/11/2019: Echocardiogram:  LVEF >65%; normal RV  -Ibrance was held on 06/10/2019 because of mild leukopenia with symptoms of pharyngitis and suspected sepsis.  06/14/2019: WBC 1.7.? ANC 0.62.? Hemoglobin 9.? Platelets 104,000/mm?.?  -06/17/2019: Restaging CTs C/A/P with contrast: Increasing left pleural effusion, moderate-sized, compared to 12/12/2018; persistent pulmonary arterial thromboembolus with diminishing burden; no obvious change in hepatic and diffuse osseous metastatic disease-06/17/2019: Restaging bone scan: Similar bone metastasis (widespread); more intense radiotracer uptake in mandible (this is compatible with her osteonecrosis of the jaw); increased uptake in the maxilla which could be related to periodontal disease (this is also secondary to osteonecrosis of the jaw).  ANC 0.51 on June 13; 0.49 on June 12; 0.77 on June 11; 1.26 on Cassie 10; 1.56 on May 9.? Previously, her ANC dropped to 0.76 on 11/04/2018: 0.79 on 10/16/2018; 0.62 on 10/12/2018, etc.  INR 9.33 yesterday, on 06/17/2019, on Coumadin 5 mg daily?(being managed by home hospice).  Presents for follow-up visit, accompanied by female family member. ?Not in any acute distress.? Generally weak but ECOG 2.? No significant chest pain or dyspnea. ?Extensively bruised, especially on upper extremities, but this is chronic. ?Absolutely denies bleeding in any form?like epistaxis, melena, hematochezia, hematemesis,?hemoptysis, bleeding from the gums, etc.? No fevers or chills. ?No unusual headaches or focal?neurological symptoms. ?Appetite is fair.  ?   10/07/2019:  -10/03/2019: Restaging CT soft tissues of the neck: Extensive spinal metastasis, stable; persistent left pleural effusion; no new change  -09/18/2019: Restaging whole-body bone scan (comparison: 06/17/2019 bone scan): Increased abnormal uptake to the distal left femoral metaphysis may represent progression of disease; otherwise, widespread bone metastasis stable  -09/18/2019: Restaging CTs C/A/P with contrast:  Stable exam with bilobar hepatic and diffuse osseous metastatic disease without significant interval change from 06/17/2019  -09/27/2019: Follow-up with Dr. Jean Claude Ley, oral surgeon: MR PATE stage II with possible secondary infection versus myositis, etc. (presentation with pain entire left side, 8/10, status post Augmentin which worked better than amoxicillin; noncontrast CT ordered)  -09/10/2019: CA 27.29 level 118.6 (somewhat on upward trajectory since 06/2019)  -10/07/2019: CMP within acceptable limits.? WBC, differential, hemoglobin, platelets normal.  Presents for follow-up visit, accompanied by female family member.? ECOG 2. ?Up and about?>50%?of waking hours.? Takes care of all activities of daily living. ?Even washes dishes.? Appetite is fair.? Chronic hip pain, 6-8 on a scale of 1-10?for which she takes tramadol.? No unusual headaches, focal neuro symptoms, chest pain, cough, dyspnea,?abdominal pain, nausea, vomiting, GI bleeding, etc.? Denies pain over the distal left?thigh.? At this time,?no significant pain?in jaw.  ?  ?  Review of Systems  A complete 12 point ROS was performed in full with pertinent positives as described in interval history. Remainder of the ROS done in full and are negative.  ?  ?   Physical Exam:  General:? Alert and oriented, No acute distress.  Eye:? Pupils are equal, round and reactive to light, Extraocular movements are intact.?  HENT:? Normocephalic, Normal hearing, Oral mucosa is moist, No pharyngeal erythema.?  Neck:? Supple, Non-tender, No lymphadenopathy.  Respiratory:? Respirations are non-labored, Symmetrical chest wall expansion, No chest wall tenderness.? No adventitious breath sounds  Cardiovascular:? Regular rhythm, No murmur, No gallop, Good pulses equal in all extremities, Normal peripheral perfusion.?  Gastrointestinal:? Soft, Non-tender, Non-distended, Normal bowel sounds, No organomegaly.?  Musculoskeletal:? Normal range of motion, Normal geriatric  stiffness.?Ambulates with the assistance of cane. Strength 4/5 throughout.  Integumentary:? Warm, Dry, Pink, No rash.?  Neurologic:? Alert, Oriented, No focal deficits, Cranial Nerves II-XII are grossly intact.?  Cognition and Speech:? Speech clear and coherent, Functional cognition intact.?  Psychiatric:? Cooperative, Appropriate mood & affect, Normal judgment, Non-suicidal.  12/17/2018:?Breath sounds diminished at the left lung base posteriorly.  ?  ?  Assessment/Plan:  1. Left breast cancer.? Diagnosed 08/21/2009.?ER positive.? UT positive.? HER-2 negative.? Lumpectomy 08/12/2009.? Mastectomy 09/08/2009 (1.1 cm IDC; high-grade; lymphatic invasion; margin positive). ?Adjuvant?chemotherapy with TC x2 (10/01/2009-01/2010).? Aromatase inhibitor only for 1 month (then, lost her insurance).  ?  --> Metastatic Adenocarcinoma Breast with malignant pleural effusion, mets to liver, bones and peritoneum (4/2017)  ?  -6/7/17: Faslodex started 06/07/2017. ?Ibrance added 09/07/2017.??  ->From?bedbound, extremely poor performance status,?experienced extremely dramatic recovery with Faslodex?plus Ibrance.  -12/20/17, pt continues to have response to chemotherapy. Mets are either stable or decreased in size.?  -3/23/18: Restaging scans ?Stable metastatic disease; CA 27.29 level continues to decline indicating response to treatment  -6/29/18:?CT C/A/P: Unchanged metastasis  -09/04/2018: Nuclear medicine bone scan and restaging CT scans chest/abdomen/pelvis with contrast: Stable metastatic disease.  -CA 27.29 level consistently dropping  -09/12/2018: ECOG 3 overall, stable  -11/14/2018:?CT head with and without contrast (headaches): No intracranial metastasis  -12/12/2018: Restaging scans:?Stable metastatic disease;?unchanged?left pleural effusion  CA 27.29 level 90.1?on 12/13/2018,?consistently dropping?(was 428?in 01/2018)  -06/10/2019: CT chest with contrast: Right middle and lower lobe pulmonary emboli; continued bilateral  pleural effusions; widespread bone metastasis with similar overall appearance since 12/12/2018.  -06/10/2019: CT soft tissues of the neck with contrast: No cervical lymphadenopathy; diffuse bone metastasis.  -06/17/2019:?Restaging CTs C/A/P with contrast?and bone scan: Stable metastatic disease  -10/03/2019: Restaging CT soft tissues of the neck: Extensive spinal metastasis, stable; persistent left pleural effusion; no new change  -09/18/2019: Restaging whole-body bone scan (comparison: 06/17/2019 bone scan): Increased abnormal uptake to the distal left femoral metaphysis may represent progression of disease; otherwise, widespread bone metastasis stable  -09/18/2019: Restaging CTs C/A/P with contrast: Stable exam with bilobar hepatic and diffuse osseous metastatic disease without significant interval change from 06/17/2019  -09/27/2019: Follow-up with Dr. Jean Claude Ley, oral surgeon: MR PATE stage II with possible secondary infection versus myositis, etc. (presentation with pain entire left side, 8/10, status post Augmentin which worked better than amoxicillin; noncontrast CT ordered)-09/10/2019: CA 27.29 level 118.6 (somewhat on upward trajectory since 06/2019)  ?  ??????  2. ?Osseous metastatic disease  -continue Tramadol as prescribed prn for pain  -03/20/2019:?Communication from a dentist:?Osteonecrosis of jaw; Xgeva?held?(she never informed us?of?continued jaw pain ever since extraction of teeth?1-1/2 years prior in September/October 2017).?  ?  ??????  3.?Headaches, weakness, ataxia  -CT Head without contrast (unable to obtain IV access)?shows no intracranial abnormality or?no mass effect.  ?  ?  4. ?Ibrance induced neutropenia  -ANC 0.51 on June 13; 0.49 on June 12; 0.77 on June 11; 1.26 on Cassie 10; 1.56 on May 9.? Previously, her ANC dropped to 0.76 on 11/04/2018: 0.79 on 10/16/2018; 0.62 on 10/12/2018, etc.  ?  ?  5. ?Pulmonary embolism  -Incidentally noted?right middle and lower lobe artery emboli?on chest  CT 06/10/2019?(no RV strain; echocardiogram normal)  ?  ?  To summarize:  Left breast cancer.? Diagnosed 08/21/2009. ?ER positive. ?CO positive. ?HER-2 negative.  Lumpectomy 08/12/2009.? Mastectomy 09/08/2009?(1.1 cm tumor; high-grade;?lymphatic invasion; margin positive).  Adjuvant TC x2?(10/01/2009-01/2010)  Adjuvant?aromatase inhibitor?for only 1 month (last medical insurance)  04/2017: Metastatic disease:?Malignant pleural effusion, liver metastasis, bone metastasis, peritoneal metastasis  06/07/2017:?Faslodex started  09/07/2017:?Palbociclib added  Dramatic response?with endocrine therapy?(dramatic improvement in performance status)  ?  -06/10/2019:?Incidentally detected PE  -Osteonecrosis of jaw; Xgeva held from 03/20/2019  ??????  ?  ?  Plan:  Question of?metastasis?in the distal left femoral metaphysis?on restaging bone scan 06/17/2019,?suggesting progression of disease.  Will evaluate with plain x-ray?and MRI scan of left femur.  ?  Until confirmation?of disease progression, continue same treatment with?fulvestrant?plus palbociclib. ?Palbociclib dose has been?decreased from 125 mg?to 100 mg in view of recurrent neutropenia).  ?  Monitor CA 27.29 level?every month.  ?  Check for PIK3CA mutation (if positive, then fulvestrant plus alpelisib is another category 1 treatment option).  ?  Test for germline BRCA1/2 mutation.? If positive, then treatment with PARP inhibitors will be an option (olaparib; talazoparib).  ?  Continue?indefinite anti-coagulation?for pulmonary emboli detected incidentally on chest CT dated 06/10/2019.  ?  Xgeva held from 03/20/2019?for osteonecrosis of the jaw.  Patient follows up with Dr. Jean Claude Ley, oral surgeon.  ?  If no evidence of disease progression on imaging of left femur, then, will restage with contrast-enhanced CT scans of chest, abdomen, and pelvis, and whole-body nuclear medicine bone scan in 3 months (December).  ?  Patient remains under the care of home hospice?to help  her with her daily needs.  ?  Follow-up visit in 2 weeks, with a result of?x-ray and MRI scan of left femur.  ?  Above discussed at length.? All questions answered. ?Her labs and scans and gave her copies for her record. ?She understands and agrees with this plan.  ?  She wanted me to space out?her?staging scans, labs, and office visits?to once a year. ?Then she said,?once every 6 months.? I clearly explained to her?that while she is on active treatment,?she need followed?with history and physical examination, labs,?and restaging scans more frequently.? Anything less than that,?but will not conform the standard of care. ?She agrees.  ?  Physical Exam  Vitals & Measurements  T:?37.0? ?C (Oral)? HR:?77(Peripheral)? RR:?18? BP:?106/74?  HT:?160?cm? WT:?67.1?kg? BMI:?26.21?   Problem List/Past Medical History  Ongoing  BMI 26.0-26.9,adult  Bone metastases  Breast ca  Cancer, metastatic  Chemotherapy induced neutropenia  Hepatic neoplasm secondary  Hypertension  Invasive ductal carcinoma of left breast  Liver cancer, primary, with metastasis from liver to other site  Metastatic breast cancer  New onset of headaches  Obesity  Terminal Illness  Tobacco user  Tobacco user  Unsteady gait  Historical  No qualifying data  Procedure/Surgical History  Drainage of Right Pleural Cavity with Drainage Device, Percutaneous Approach (2017)  Thoracentesis, needle or catheter, aspiration of the pleural space; with imaging guidance (2017)  Mastectomy ()  Appendectomy;  back surgery    Mastectomy   Medications  amLODIPine 5 mg oral tablet, 5 mg= 1 tab(s), Oral, Daily  calcium (as carbonate)-vitamin D 500 mg-400 intl units oral tablet, 1 tab(s), Oral, BID, 11 refills  chlorhexidine 0.12% mucous membrane liquid  haloperidol 2 mg/mL oral concentrate  Ibrance 100 mg oral capsule, 100 mg= 1 cap(s), Oral, Daily, 1 refills  PANTOPRAZOLE SODIUM 40 MG TBEC, 40 mg= 1 tab(s), Oral, Daily  polyethylene glycol 3350 oral  powder for reconstitution  SENNA 8.6 MG TABS, 17.2 mg= 2 tab(s), Oral, Daily  Seroquel 50 mg oral tablet, 50 mg= 1 tab(s), Oral  traMADol 50 mg oral tablet, 50 mg= 1 tab(s), Oral, q6hr, PRN  Vitamin K 5 mg Oral Tab, 5 mg, Oral, Once  warfarin 5 mg oral tablet, 5 mg= 1 tab(s), Oral, Daily, 1 refills,? ?Not taking  Xanax 0.5 mg oral tablet, 0.5 mg= 1 tab(s), Oral, TID  Allergies  HYDROcodone?(altered mental state)  Social History  Abuse/Neglect  No, 09/11/2019  No, 09/10/2019  Alcohol  Past, 12/17/2018  Never, 04/15/2017  Employment/School  Retired, 12/17/2018  Retired, 05/03/2018  Home/Environment  Lives with Alone., 12/17/2018  Lives with Alone., 05/03/2018  Nutrition/Health  Regular, 05/03/2018  Sexual  Sexual orientation: Straight or heterosexual. Gender Identity Identifies as female., 03/12/2019  Substance Use  Never, 12/17/2018  Never, 04/15/2017  Tobacco  4 or less cigarettes(less than 1/4 pack)/day in last 30 days, No, 09/11/2019  10 or more cigarettes (1/2 pack or more)/day in last 30 days, No, 08/14/2019  Family History  CAD (coronary artery disease): Mother.  Diabetes mellitus type 2: Mother.  High blood pressure 09-AUG-2016 15:25:48<$>: Mother.  Mesothelioma: Father.  Primary malignant neoplasm of breast: Other.  Immunizations  Vaccine Date Status   influenza virus vaccine, inactivated 10/12/2018 Given   influenza virus vaccine, inactivated 10/23/2017 Given       States she has a metal patrick in the back. ?Therefore, did of MRI scan of left femur,?will order?CT scan.

## 2022-05-02 NOTE — HISTORICAL [facility] CERNER
This is a historical note converted from [employer]. Formatting and pictures may have been removed.  Please reference [employer] for original formatting and attached multimedia. Chief Complaint  Breast Cancer  History of Present Illness  Problem List:  1. Recurrent metastatic breast cancer ? 4/15/2017?  2. Left breast cancer upper outer quadrant, diagnosed 8-  ?  Current Treatment  Faslodex monthly. Started on 6/6/2017.?Next dose due 4/10/19.  Ibrance 125 mg p.o. daily with food for 21 days followed by 7 days off. Started 9/20/2017. Next cycle starts 4/18/19.  Xgeva subcu monthly. Started 11/3/2017. Holding for treatment of ONJ.  ?   Treatment History?  S/p left lumpectomy 8/12/2009  S/p Left mastectomy 9/8/2009  Taxotere/Cytoxan beginning on 10/1/09 and ending in January 2010 at [facility] [place] (patient reports 2 rounds of chemo)  -Aromasin inhibitor for 1 month. ?Patient did not continue this medication she says because of loss of insurance.?  Xeloda stopped on 6-8-17 2/2 painful mucositis.  Right thoracentesis on 5/2/17, which showed cytomorphology of adenocarcinoma, consistent with a breast primary.  ?   History of Present Illness  Left breast cancer upper outer quadrant?ER 90%, VT 15%, Her 2 +1 by IHC, ki 67- 25%  ?Surgery: left lumpectomy 8/12/2009. ?Pathology: 1.2 cm invasive ductal ca, grade 2. ?Lymphovascular invasion, positive margin  Surgery: left mastectomy ?9/8/2009. ?Pathology: ?invasive ductal carcinoma largest tumor nodule 1.1, smaller nodule 0.5 cm. ?DCIS + high grade lymphatic invasion: NO. Perineural invasion: NO.?  Margin: Positive to ink margin of largest 0 of 21 nodes ER +/VT +, ? HER2 1+ by IHC. Stage: unknown. ?Treatment: Taxotere/Cytoxan beginning on 10/1/09 and ending in January 2010 at [facility] [place] ?(patient reports 2 rounds of chemo). ?Aromasin inhibitor for 1 month. ?Patient did not continue this medication she says because of loss of insurance.?  ?   1/2010: Right mastectomy at age 54 followed by  reconstruction.  ?   ?She presented to the ER on 4/15/17 with three-month history of left upper quadrant pain, nausea/vomiting and progressive shortness of breath. Past medical history significant for heavy smoking.?  ?CT imaging identified extensive metastatic disease. She underwent a right thoracentesis on 5/2/17, which showed cytomorphology of adenocarcinoma, consistent with a breast primary.  ????  Recurrence:??CT chest/abd/pelvis 4/15/2017 -subcarinal node 2.1 x 3.0 cm, Right hilar lymph node 1.6 x 1.5 cm. Extensive left internal mammary lymphadenopathy ?3.6 x 4.4 cm enlarged internal mammary and left superior mediastinal lymph nodes as well. Right anterior mediastinal lymph nodes 1.0 x 1.5 cm. ?Right para-aortic lymph node ?1.2 x 2.2 cm. Multiple enlarged paracardiac/cardiophrenic lymph nodes 1.7 x 2.0 cm. Large right and moderate left pleural effusions. ?Multifocal right pleural soft tissue nodularity 2.4 x 1.7 cm. Numerous pulmonary nodules. Extensive osseous metastatic disease with involvement of the thoracic spine, sternum, and suspected involvement of several ribs. Innumerable hepatic metastatic lesions 4.0 x 3.6 cm and 3.1x 2.6 cm. Multiple small retroperitoneal nodes. Metastatic deposit in the retroperitoneum lateral to the right kidney 2.1 x 2.7 cm. Xeloda stopped on 6-8-17 2/2 painful mucositis. Started Faslodex on 06/07/2014, with dramatic turn around in her general health. ?  ?  Interval History:  3/13/19: This patient follows up for 1 month follow up. She has been stable on Ibrance and faslodex. She has no new or worsening complaints. Has a relatively good performance status. She has no intolerable side effects or toxicities to treatment.  ?   4/9/19: Patient presents for follow up on Ibrance/Faslodex/Xgeva; she is scheduled to start her week off on Thursday 4/11/19. She complains of weakness/fatigue and?constant bilateral hip pain 5/10, neither of which are new symptoms. Her K+ is low at 3.4; she  states she stopped taking her potassium. She likes bananas; advised her to eat at least one banana a day. WBC 3.9; ANC 2.02. H&H are normal at 13.3 & 38.4. Apparently, the patient was experiencing tooth pain and pain with eating for 18months; her sister states she says good to everything when she goes to doctor appointments. Xgeva was placed on hold once the Oncology clinic was informed of her symptoms. Her dentist is planning surgery; she does not know what surgery is being planned. She was recently placed on antibiotics. Her appetite is so-so per her report; however, since being placed on antibiotics, pain with eating has resolved. She does not want an appetite stimulant. She has dry skin across her chest; advised her to use Eucerin or CeraVe. Will have her follow up in 4 weeks with labs on her off week which is between 5/9/19 and 5/15/19. She is amenable to this plan.  Review of Systems  A complete 12-point?ROS was performed in full with pertinent positives as described in interval history. Remainder of ROS done in full and are negative.  Physical Exam  Vitals & Measurements  T:?37.2? ?C (Oral)? HR:?85(Peripheral)? BP:?107/74? SpO2:?97%?  HT:?160?cm? WT:?62.9?kg? BMI:?24.57?  General: ?Alert and oriented, No acute distress.??  Appearance: Well-groomed  HEENT: ?Normocephalic, Oral mucosa is moist.?Pupils are equal, round and reactive to light, Extraocular movements are intact, Normal conjunctiva.?  Neck: ?Supple, Non-tender, No lymphadenopathy, No thyromegaly.??  Respiratory: ?Lungs are clear to auscultation, Respirations are non-labored, Breath sounds are equal, Symmetrical chest wall expansion.??  Cardiovascular: ?Normal rate, Regular rhythm, No edema.??  Breast:??Bilateral breasts?firm, bilateral horizontal mastectomy scars.  Gastrointestinal: ?Soft, Non-tender, Non-distended, Normal bowel sounds.??  Musculoskeletal: ?Normal strength.??  Integumentary: ?Warm, dry, intact.?Flaky skin across upper  chest.  Neurologic: ?Alert, Oriented, No focal deficits, Cranial Nerves II-XII are grossly intact.??  Cognition and Speech: ?Oriented, Speech clear and coherent.??  Psychiatric: ?Cooperative, Appropriate mood & affect. ?  ECOG Performance Scale: 2 - Capable of all self-care but unable to carry out any work activities. Up and about greater than 50 percent of waking hours.?  Assessment/Plan  1.?Malignant neoplasm of upper-outer quadrant of left female breast  Assessment/Plan  1.?Malignant neoplasm of upper-outer quadrant of left female breast?C50.412  ??Metastatic Left breast cancer.? Diagnosed 08/21/2009.?ER positive.? VA positive.? HER-2 negative.? Lumpectomy 08/12/2009.? Mastectomy 09/08/2009 (1.1 cm IDC; high-grade lymphatic invasion; margin positive). ?Adjuvant?chemotherapy with TC x2 (10/01/2009-01/2010).? Aromatase inhibitor only for 1 month (then, lost her insurance).  ?   2017: Metastatic Adenocarcinoma Breast with malignant pleural effusion, mets to liver, bones and peritoneum (4/2017)  -6/7/17: Faslodex started 06/07/2017. ?Ibrance added 09/07/2017.??  ->From?bedbound, extremely poor performance status,?experienced extremely dramatic recovery with Faslodex.  -12/20/17, pt continues to have response to chemotherapy. Mets are either stable or decreased in size.?  -3/23/18: Restaging scans ?Stable metastatic disease; CA 27.29 level continues to decline indicating response to treatment  -6/29/18:?CT C/A/P: No changes in hepatic and osseous metastatic disease since 3/23/18. Similar moderate left pleural effusion. 3 mm right upper lobe nodule is not definitively seen before, but is nonspecific. Suggest attention on follow-up studies. Unchanged extensive skeletal metastatic involvement.  -09/04/2018: Nuclear medicine bone scan and restaging CT scans chest/abdomen/pelvis with contrast: Stable metastatic disease.  -CA 27.29 level consistently dropping  -09/12/2018: ECOG 3 overall, stable  -11/14/2018:?CT head with  and without contrast (headaches): No intracranial metastasis  -12/12/2018: Restaging scans:?Stable metastatic disease;?unchanged?left pleural effusion  ?   Plan:  -Stable metastatic disease, without worsening,?on current regimen of Faslodex plus Ibrance.  -Reasonably satisfactory functional status, varying between ECOG 1 and ECOG 2  -Current treatment: Faslodex and Ibrance as endocrine therapy for metastatic breast cancer; has had very good response, followed by stable metastatic disease.?  -Continue to follow CA 27.29 level monthly.  -Calcium plus vitamin D twice a day with Xgeva?to prevent hypocalcemia  ?   -Apparently,?we are experiencing considerable?difficulty?drawing her blood from labs. ?She has to be stuck multiple times. ?Therefore, we have decided?to space out restaging scans?to every 6 months?unless?absolutely needed?at shorter time intervals. ?She completely agrees?enthusiastically.  -->Restage with contrast enhanced CT scans of chest/abdomen/pelvis and whole-body nuclear medicine bone scan in 6 months?(June 2019);?earlier,?if necessitated?by?symptoms, signs,?or worsening labs.  ?   Ok to proceed with Ibrance/Faslodex. Next cycle starts 4/18/19.  Follow up in 4 weeks (on her off week between 5/9/19and 5/15/19) with CBC, CMP, CA 27-29.  CTs and bone scan scheduled for 6/17/19.  Ordered:  ?  2.?Bone metastases  Osseous metastatic disease  -continue Tramadol as prescribed prn for pain  ?  Xgeva on hold for treatment of ONJ by patients dentist.  Continue Calcium + Vitamin D twice daily.  Ordered:  ?   Problem List/Past Medical History  Ongoing  BMI 26.0-26.9,adult  Bone metastases  Breast ca  Cancer, metastatic  Hepatic neoplasm secondary  Hypertension  Invasive ductal carcinoma of left breast  Liver cancer, primary, with metastasis from liver to other site  New onset of headaches  Obesity  Terminal Illness  Tobacco user  Tobacco user  Unsteady gait  Historical  No qualifying data  Procedure/Surgical  History  Drainage of Right Pleural Cavity with Drainage Device, Percutaneous Approach (2017)  Thoracentesis, needle or catheter, aspiration of the pleural space; with imaging guidance (2017)  Mastectomy ()  Appendectomy;  back surgery    Mastectomy   Medications  amlodipine 5 mg oral tablet, 5 mg= 1 tab(s), Oral, Daily  calcium (as carbonate)-vitamin D 500 mg-400 intl units oral tablet, 1 tab(s), Oral, BID, 11 refills  dexamethasone 1 mg oral tablet, 1 tab, Oral, Daily  haloperidol 1 mg oral tablet, 1 mg= 1 tab(s), Oral, q4hr, PRN,? ?Not taking  haloperidol 2 mg/mL oral concentrate  Ibrance 125 mg oral capsule, 125 mg= 1 cap(s), Oral, Daily, 4 refills  PANTOPRAZOLE SODIUM 40 MG TBEC, 40 mg= 1 tab(s), Oral, Daily  Percocet 5/325 oral tablet, 1 tab(s), Oral, q6hr, PRN,? ?Not taking  polyethylene glycol 3350 oral powder for reconstitution  Protonix 20 mg ORAL enteric coated tablet, 20 mg= 1 tab(s), Oral, Daily,? ?Not Taking per Prescriber  QUETIAPINE FUMARATE 25 MG TABS, 25 mg= 1 tab(s), Oral, qPM,? ?Not taking  SENNA 8.6 MG TABS, 17.2 mg= 2 tab(s), Oral, Daily  Seroquel 50 mg oral tablet, 50 mg= 1 tab(s), Oral  traMADol 50 mg oral tablet, 50 mg= 1 tab(s), Oral, q6hr, PRN  Xanax 0.5 mg oral tablet, 0.5 mg= 1 tab(s), Oral, TID  Allergies  HYDROcodone?(altered mental state)  Social History  Alcohol  Past, 2018  Never, 04/15/2017  Employment/School  Retired, 2018  Retired, 2018  Home/Environment  Lives with Alone., 2018  Lives with Alone., 2018  Nutrition/Health  Regular, 2018  Sexual  Sexual orientation: Straight or heterosexual. Gender Identity Identifies as female., 2019  Substance Abuse  Never, 2018  Never, 04/15/2017  Tobacco  10 or more cigarettes (1/2 pack or more)/day in last 30 days, No, 2019  Family History  CAD (coronary artery disease): Mother.  Diabetes mellitus type 2: Mother.  High blood pressure 09-AUG-2016 15:25:48<$>:  Mother.  Mesothelioma: Father.  Primary malignant neoplasm of breast: Other.  Immunizations  Vaccine Date Status   influenza virus vaccine, inactivated 10/12/2018 Given   influenza virus vaccine, inactivated 10/23/2017 Given   Health Maintenance  Health Maintenance  ???Pending?(in the next year)  ??? ??Due?  ??? ? ? ?Advance Directive due??04/09/19??and every 1??year(s)  ??? ? ? ?Alcohol Misuse Screening due??04/09/19??and every 1??year(s)  ??? ? ? ?Aspirin Therapy for CVD Prevention due??04/09/19??and every 1??year(s)  ??? ? ? ?Bone Density Screening due??04/09/19??Variable frequency  ??? ? ? ?Breast Cancer Screening (Senior Wellness) due??04/09/19??and every?  ??? ? ? ?Cognitive Screening due??04/09/19??and every 1??year(s)  ??? ? ? ?Colorectal Screening (Senior Wellness) due??04/09/19??and every?  ??? ? ? ?Functional Assessment due??04/09/19??and every 1??year(s)  ??? ? ? ?Geriatric Depression Screening due??04/09/19??and every 1??year(s)  ??? ? ? ?Hypertension Management-Education due??04/09/19??and every 1??year(s)  ??? ? ? ?Lung Cancer Screening due??04/09/19??and every 1??year(s)  ??? ? ? ?Pneumococcal Vaccine due??04/09/19??Variable frequency  ??? ? ? ?Pneumococcal Vaccine due??04/09/19??and every?  ??? ? ? ?Tetanus Vaccine due??04/09/19??and every 10??year(s)  ??? ? ? ?Zoster Vaccine due??04/09/19??and every 100??year(s)  ??? ??Due In Future?  ??? ? ? ?Smoking Cessation not due until??09/12/19??and every 1??year(s)  ??? ? ? ?ADL Screening not due until??02/12/20??and every 1??year(s)  ??? ? ? ?Hypertension Management-Blood Pressure not due until??04/08/20??and every 1??year(s)  ??? ? ? ?Hypertension Management-BMP not due until??04/08/20??and every 1??year(s)  ???Satisfied?(in the past 1 year)  ??? ??Satisfied?  ??? ? ? ?ADL Screening on??02/12/19.??Satisfied by Mireille Langston LPN  ??? ? ? ?Blood Pressure Screening on??04/09/19.??Satisfied by Heath Mederos LPN  ??? ? ? ?Body Mass Index Check  on??04/09/19.??Satisfied by Heath Mederos LPN  ??? ? ? ?Depression Screening on??04/09/19.??Satisfied by Heath Mederos LPN  ??? ? ? ?Diabetes Screening on??04/09/19.??Satisfied by Paulina Luu  ??? ? ? ?Fall Risk Assessment on??04/09/19.??Satisfied by Heath Mederos LPN  ??? ? ? ?Hypertension Management-Blood Pressure on??04/09/19.??Satisfied by Heath Mederos LPN  ??? ? ? ?Influenza Vaccine on??10/12/18.??Satisfied by Mireille Langston LPN  ??? ? ? ?Obesity Screening on??04/09/19.??Satisfied by Heath Mederos LPN  ??? ? ? ?Smoking Cessation on??09/12/18.??Satisfied by [place] LPN, Beonka S  ?  ?  Lab Results  Test Name Test Result Date/Time   Sodium Lvl 141 mmol/L 04/09/2019 08:09 CDT   Potassium Lvl 3.4 mmol/L (Low) 04/09/2019 08:09 CDT   Chloride 109 mmol/L (High) 04/09/2019 08:09 CDT   CO2 24 mmol/L 04/09/2019 08:09 CDT   Calcium Lvl 9.7 mg/dL 04/09/2019 08:09 CDT   Glucose Lvl 122 mg/dL (High) 04/09/2019 08:09 CDT   BUN 15 mg/dL 04/09/2019 08:09 CDT   Creatinine 0.70 mg/dL 04/09/2019 08:09 CDT   eGFR-AA >105 mL/min 04/09/2019 08:09 CDT   eGFR-FLORI 89 mL/min (Low) 04/09/2019 08:09 CDT   Bili Total 0.3 mg/dL 04/09/2019 08:09 CDT   Bili Direct 0.1 mg/dL 04/09/2019 08:09 CDT   Bili Indirect 0.2 mg/dL 04/09/2019 08:09 CDT   AST 15 unit/L 04/09/2019 08:09 CDT   ALT 26 unit/L 04/09/2019 08:09 CDT   Alk Phos 92 unit/L 04/09/2019 08:09 CDT   Total Protein 7.5 gm/dL 04/09/2019 08:09 CDT   Albumin Lvl 3.2 gm/dL (Low) 04/09/2019 08:09 CDT   Globulin 4.30 gm/mL (High) 04/09/2019 08:09 CDT   A/G Ratio 0.7 ratio (Low) 04/09/2019 08:09 CDT   WBC 3.9 x10(3)/mcL (Low) 04/09/2019 08:09 CDT   RBC 3.38 x10(6)/mcL (Low) 04/09/2019 08:09 CDT   Hgb 13.3 gm/dL 04/09/2019 08:09 CDT   Hct 38.4 % 04/09/2019 08:09 CDT   Platelet 102 x10(3)/mcL (Low) 04/09/2019 08:09 CDT   .6 fL (High) 04/09/2019 08:09 CDT   MCH 39.3 pg (High) 04/09/2019 08:09 CDT   MCHC 34.6 gm/dL 04/09/2019 08:09 CDT   RDW 14.5 % 04/09/2019 08:09 CDT    MPV 9.9 fL 04/09/2019 08:09 CDT   Abs Neut 2.02 x10(3)/mcL (Low) 04/09/2019 08:09 CDT

## 2022-05-02 NOTE — HISTORICAL [facility] CERNER
This is a historical note converted from Jeanne. Formatting and pictures may have been removed.  Please reference Jeanne for original formatting and attached multimedia. Chief Complaint  Metastatic Breast Cancer  History of Present Illness  Problem List:  1. Recurrent metastatic breast cancer ? 4/15/2017?  2. Left breast cancer upper outer quadrant, diagnosed 8-  -Treatment: ?Taxotere/Cytoxan beginning on 10/1/09 and ending in January 2010 at [facility] [place] (patient reports 2 rounds of chemo)  -Aromasin inhibitor for 1 month. ?Patient did not continue this medication she says because of loss of insurance.?  ?  Current Treatment:  Faslodex monthly. Started on 6-6-2017. Next dose due 7/17/19.??  Ibrance 125 mg p.o. daily with food for 21 days followed by 7 days off. Started 9-. Dose reduced to 100mg on 6/23/19. Next cycle starts 7/21/19.  Xgeva subcu monthly. 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  ?  Treatment History:?  Xeloda stopped on 6-8-17 2/2 painful mucositis.  Right thoracentesis on 5/2/17, which showed cytomorphology of adenocarcinoma, consistent with a breast primary.  ????  Clinical History  1.?Left breast cancer upper outer quadrant?ER 90%, WV 15%, Her 2 +1 by IHC, ki 67- 25%  ?Surgery: left lumpectomy 8/12/2009. ?Pathology: 1.2 cm invasive ductal ca, grade 2. ?Lymphovascular invasion, positive margin  Surgery: left mastectomy ?9/8/2009. ?Pathology: ?invasive ductal carcinoma largest tumor nodule 1.1, smaller nodule 0.5 cm. ?DCIS + high grade lymphatic invasion: NO. Perineural invasion: NO.?  Margin: Positive to ink margin of largest 0 of 21 nodes ER +/WV +, ? HER2 1+ by IHC. Stage: unknown. ?Treatment: Taxotere/Cytoxan beginning on 10/1/09 and ending in January 2010 at [facility] [place] ?(patient reports 2 rounds of chemo). ?Aromasin inhibitor for 1 month. ?Patient did not continue this medication she says because of loss of insurance.?  ?She presented to the ER on 4/15/17 with three-month  history of left upper quadrant pain, nausea/vomiting and progressive shortness of breath. Past medical history significant for heavy smoking.?  ?CT imaging identified extensive metastatic disease. She underwent a right thoracentesis on 5/2/17, which showed cytomorphology of adenocarcinoma, consistent with a breast primary.  ????  Recurrence:??CT chest/abd/pelvis 4/15/2017 -subcarinal node 2.1 x 3.0 cm, Right hilar lymph node 1.6 x 1.5 cm. Extensive left internal mammary lymphadenopathy ?3.6 x 4.4 cm enlarged internal mammary and left superior mediastinal lymph nodes as well. Right anterior mediastinal lymph nodes 1.0 x 1.5 cm. ?Right para-aortic lymph node ?1.2 x 2.2 cm. Multiple enlarged paracardiac/cardiophrenic lymph nodes 1.7 x 2.0 cm. Large right and moderate left pleural effusions. ?Multifocal right pleural soft tissue nodularity 2.4 x 1.7 cm. Numerous pulmonary nodules. Extensive osseous metastatic disease with involvement of the thoracic spine, sternum, and suspected involvement of several ribs. Innumerable hepatic metastatic lesions 4.0 x 3.6 cm and 3.1x 2.6 cm. Multiple small retroperitoneal nodes. Metastatic deposit in the retroperitoneum lateral to the right kidney 2.1 x 2.7 cm. Xeloda stopped on 6-8-17 2/2 painful mucositis. Started Faslodex on 06/07/2014, with dramatic turn around in her general health. ?  ?  9/07/2017?Started concurrent Ibrance 125mg po daily. ?With Faslodex, her general health has made a dramatic turnaround. ?From being bedbound until 2 or 3 months back, she became up and about, and bubbling with energy. ?Enjoying life. ?Enjoying her familys company as well as reunion. ?She mows her own glass in her backyard. ?Appetite is great. ?The only symptom she has is pain in hips and lower back, 4 out on a scale of 1-10, if 10 could be the worst. ?Takes tramadol only sparingly. ?Before starting treatment, she had lost 30 pounds in last 6 months, but says that she has regained 15 pounds back.  ?Denies neurological symptoms chest pain shortness of breath abdominal pain nausea vomiting etc.  ?  Presents for a follow-up visit. ?Extremely well. ?Bubbling with energy. ?Excellent appetite. ?No significant aches or pains. ?9/07/2017, CA 27.29 level was greater than 450. ?It was 4999 on 05/16/2017. CEA level 10.2. ?CBC unremarkable. ?CA 15-3 level elevated to 1836. ?Whole-body bone scan on 09/15/2017 showed diffuse hypermetabolic activity throughout the spine, bilateral ribs, pelvis, and proximal femora, possibly with some increased activity in the calvarium as well, all findings consistent with widespread skeletal metastasis. ?Follow-up CT scans of abdomen pelvis on 09/15/2017 showed a few scattered subcentimeter lung nodules in the right lung which were not seen previously but could have been masked secondary to large pleural effusion on previous exam; a small right pleural effusion; left lung nodules are not seen on the current exam; left pleural effusion has increased; widespread liver metastasis appears smaller; widespread skeletal metastases were again noted, worse at T6 and T7; stable mesenteric nodules.??  ?   12/17/2018:  -11/14/2018: CT head with and without contrast (headaches): No intracranial metastasis.  -12/12/2018: Restaging whole-body nuclear medicine bone scan: No metastatic disease.  -12/20/2018: Restaging CT C/A/P with contrast: Unchanged left pleural effusion volume in left lower lung lobe considerable atelectasis; no lung metastasis or thoracic adenopathy; stable hepatic metastatic involvement; extensive skeletal metastatic involvement, unchanged.  -12/13/2018: CA 27.29 level 90.1, continues to decline (was 428 in 01/2018).? CMP unremarkable.? CBC unremarkable.? Hemoglobin 12.5 g%.? Has been variably neutropenic, mostly in the mild range, for last many months, intermittently, as side effect of Ibrance.  Presents for follow-up visit, accompanied by a female family member.? Always  fatigued?but?functional status varies between ECOG 1 and ECOG 2.? No consistent decline. ?Appetite is fair.? No?fevers, chills, chest pain, cough, dyspnea,?unusual headaches, focal neurological symptoms, abdominal pain, nausea, vomiting, GI bleeding, etc.  ?   06/18/2019:  -Admitted [facility] 06/10/2019-06/14/2019.? Sore throat for 1 month.? Admitted with weakness, fatigue.? Enlarged left submandibular lymph nodes, tender to palpation.? Sore throat with difficulty swallowing.? Temperature 38.2.? Heart rate 120.? Mild cytopenias.? CTs showed right middle and lower lobe PE.? Started on Lovenox, then transitioned to Coumadin.? Vancomycin plus Zosyn for sepsis.? Cultures negative.? Patient improved.? Never complained of chest pain or dyspnea.? [facility].  -06/10/2019: CT chest with contrast: Right middle and lower lobe pulmonary emboli; continued bilateral pleural effusions; widespread bone metastasis with similar overall appearance since 12/12/2018.  06/10/2019: CT soft tissues of the neck with contrast: No cervical lymphadenopathy; diffuse bone metastasis.  -06/11/2019: Echocardiogram: LVEF >65%; normal RV  -Ibrance was held on 06/10/2019 because of mild leukopenia with symptoms of pharyngitis and suspected sepsis.  06/14/2019: WBC 1.7.? ANC 0.62.? Hemoglobin 9.? Platelets 104,000/mm?.?  -06/17/2019: Restaging CTs C/A/P with contrast: Increasing left pleural effusion, moderate-sized, compared to 12/12/2018; persistent pulmonary arterial thromboembolus with diminishing burden; no obvious change in hepatic and diffuse osseous metastatic disease-06/17/2019: Restaging bone scan: Similar bone metastasis (widespread); more intense radiotracer uptake in mandible (this is compatible with her osteonecrosis of the jaw); increased uptake in the maxilla which could be related to periodontal disease (this is also secondary to osteonecrosis of the jaw).  ANC 0.51 on June 13; 0.49 on June 12; 0.77 on June 11; 1.26 on  Cassie 10; 1.56 on May 9.? Previously, her ANC dropped to 0.76 on 11/04/2018: 0.79 on 10/16/2018; 0.62 on 10/12/2018, etc.  INR 9.33 yesterday, on 06/17/2019, on Coumadin 5 mg daily?(being managed by home hospice).  Presents for follow-up visit, accompanied by female family member. ?Not in any acute distress.? Generally weak but ECOG 2.? No significant chest pain or dyspnea. ?Extensively bruised, especially on upper extremities, but this is chronic. ?Absolutely denies bleeding in any form?like epistaxis, melena, hematochezia, hematemesis,?hemoptysis, bleeding from the gums, etc.? No fevers or chills. ?No unusual headaches or focal?neurological symptoms. ?Appetite is fair.  ?  Interval History?  7/16/19: Patient presents today for follow up on Ibrance/Faslodex; she is scheduled to received Faslodex tomorrow.?She presents?using a walking stick with her daughter.?Her Ibrance dose was reduced to 100mg; she started this reduced dose on 6/23/19 and completed this cycle on 7/14/19. She is scheduled to start her next cycle on 7/21/19. Patient states she was unable to start on 6/18/19 because she had to wait for the medication to arrive from the specialty pharmacy. She states she does not have the medications for her next cycle yet either. Instructed her to inform us or have the specialty pharmacy inform us what we need to do to make sure she has the next cycles dose in time to start the next cycle. She states she feels good today. CMP is stable. WBC 2.3; H&H WNL at 12.2 & 37.3; plts 129k;  (grade 3 neutropenia). The patient is in the middle of her off week. With her ANC being so close to?1000 snf through her off week, and because it improved despite having started the last cycle with , I am confident that her ANC will improve to grade 2 neutropenia prior to starting her next cycle. Therefore, I will not hold her next cycle. Will have her follow up in 4 weeks on her off week (between 8/12/19 and 8/16/19) with  labs. She is amenable to this plan.  Review of Systems  A complete 12-point?ROS was performed in full with pertinent positives as described in interval history. Remainder of ROS done in full and are negative.  Physical Exam  Vitals & Measurements  T:?37.7? ?C (Oral)? HR:?106(Peripheral)? BP:?107/78? SpO2:?96%?  HT:?160?cm? WT:?66.2?kg? BMI:?25.86?  General: ?Alert and oriented, No acute distress.??  Appearance: Well-groomed; walks with walking stick.  HEENT: ?Normocephalic, Oral mucosa is moist.?Pupils are equal, round and reactive to light, Extraocular movements are intact, Normal conjunctiva.?  Neck: ?Supple, Non-tender, No lymphadenopathy, No thyromegaly.??  Respiratory: ?Lungs are clear to auscultation, Respirations are non-labored, Breath sounds are equal, Symmetrical chest wall expansion.??  Cardiovascular: ?Normal rate, Regular rhythm, No edema.??  Breast:??Breast exam not performed on todays visit.??  Gastrointestinal: ?Soft, Non-tender, Non-distended, Normal bowel sounds.??  Musculoskeletal: ?Normal strength.??  Integumentary: ?Warm, dry, intact.?Dry, flaking skin to chest.?  Neurologic: ?Alert, Oriented, No focal deficits, Cranial Nerves II-XII are grossly intact.??  Cognition and Speech: ?Oriented, Speech clear and coherent.??  Psychiatric: ?Cooperative, Appropriate mood & affect. ?  ECOG Performance Scale: 2 - Capable of all self-care but unable to carry out any work activities. Up and about greater than 50 percent of waking hours.?  Assessment/Plan  1.?Breast ca?C50.412  Assessment:  1. Left breast cancer.? Diagnosed 08/21/2009.?ER positive.? LA positive.? HER-2 negative.? Lumpectomy 08/12/2009.? Mastectomy 09/08/2009 (1.1 cm IDC; high-grade; lymphatic invasion; margin positive). ?Adjuvant?chemotherapy with TC x2 (10/01/2009-01/2010).? Aromatase inhibitor only for 1 month (then, lost her insurance).  --> Metastatic Adenocarcinoma Breast with malignant pleural effusion, mets to liver, bones and  peritoneum (4/2017)  -6/7/17: Faslodex started 06/07/2017. ?Ibrance added 09/07/2017.??  ->From?bedbound, extremely poor performance status,?experienced extremely dramatic recovery with Faslodex?plus Ibrance.  -12/20/17, pt continues to have response to chemotherapy. Mets are either stable or decreased in size.?  -3/23/18: Restaging scans ?Stable metastatic disease; CA 27.29 level continues to decline indicating response to treatment  -6/29/18:?CT C/A/P: Unchanged metastasis  -09/04/2018: Nuclear medicine bone scan and restaging CT scans chest/abdomen/pelvis with contrast: Stable metastatic disease.  -CA 27.29 level consistently dropping  -09/12/2018: ECOG 3 overall, stable  -11/14/2018:?CT head with and without contrast (headaches): No intracranial metastasis  -12/12/2018: Restaging scans:?Stable metastatic disease;?unchanged?left pleural effusion  CA 27.29 level 90.1?on 12/13/2018,?consistently dropping?(was 428?in 01/2018)  -06/10/2019: CT chest with contrast: Right middle and lower lobe pulmonary emboli; continued bilateral pleural effusions; widespread bone metastasis with similar overall appearance since 12/12/2018.  -06/10/2019: CT soft tissues of the neck with contrast: No cervical lymphadenopathy; diffuse bone metastasis.  -06/17/2019:?Restaging CTs C/A/P with contrast?and bone scan: Stable metastatic disease  ??????  2.?Headaches, weakness, ataxia  -CT Head without contrast (unable to obtain IV access)?shows no intracranial abnormality or?no mass effect.  ?   3. ?Pulmonary embolism  -Incidentally noted?right middle and lower lobe artery emboli?on chest CT 06/10/2019?(no RV strain; echocardiogram normal)  ?   ??????  Plan:  Continue Faslodex plus Ibrance?without change?(except?decrease Ibrance dose from 125 mg daily to 100 mg daily in view of recurrent?neutropenia).  ?   In view of recurrent neutropenia?(mostly, grade 3),?it will help?to decrease the dose?of Ibrance to 100 mg p.o. daily?(daily for 3 weeks,  then 1 week off)  ?   Current treatment: Faslodex and Ibrance as endocrine therapy for metastatic breast cancer; has had very good response, followed by a stable metastatic disease. Continue.  ?   Continue to follow CA 27.29 level monthly.  ?   Continue?indefinite anti-coagulation?for pulmonary emboli detected incidentally on chest CT dated 06/10/2019.  ?   Restage with contrast-enhanced CT scans of chest, abdomen, and pelvis,?and whole-body nuclear medicine bone scan in 3 months (mid September).  ?   Patient remains under the care of home hospice?to help her with her daily needs.  ?  Ok to proceed with next cycle to start 7/21/19.  Ibrance refill sent to patients pharmacy.  Follow up in 4 weeks on off week (between?8/12/19 & 8/16/19) with CBC, CMP, CA 27.29; earlier,?if any concerns.  Restaging scans scheduled for 9/18/19.  Ordered:  ?  2.?Bone metastases?C79.51  -continue Tramadol as prescribed prn for pain  -03/20/2019:?Communication from a dentist:?Osteonecrosis of jaw; Xgeva?held?(she never informed us?of?continued jaw pain ever since extraction of teeth?1-1/2 years prior in September/October 2017).??  ?  Xgeva held from 03/20/2019?for osteonecrosis of the jaw.  Patient follows up with Dr. Jean Claude Ley, dentist.  Ordered:  ?  3.?Chemotherapy induced neutropenia?D70.1  Ibrance induced neutropenia  -ANC 0.51 on June 13; 0.49 on June 12; 0.77 on June 11; 1.26 on Cassie 10; 1.56 on May 9.? Previously, her ANC dropped to 0.76 on 11/04/2018: 0.79 on 10/16/2018; 0.62 on 10/12/2018, etc.  -7/16/19 ANC: 940  Ordered:  ?   Problem List/Past Medical History  Ongoing  BMI 26.0-26.9,adult  Bone metastases  Breast ca  Cancer, metastatic  Hepatic neoplasm secondary  Hypertension  Invasive ductal carcinoma of left breast  Liver cancer, primary, with metastasis from liver to other site  New onset of headaches  Obesity  Terminal Illness  Tobacco user  Tobacco user  Unsteady gait  Historical  No qualifying data  Procedure/Surgical  History  Drainage of Right Pleural Cavity with Drainage Device, Percutaneous Approach (2017)  Thoracentesis, needle or catheter, aspiration of the pleural space; with imaging guidance (2017)  Mastectomy ()  Appendectomy;  back surgery    Mastectomy   Medications  amLODIPine 5 mg oral tablet, 5 mg= 1 tab(s), Oral, Daily  calcium (as carbonate)-vitamin D 500 mg-400 intl units oral tablet, 1 tab(s), Oral, BID, 11 refills  chlorhexidine 0.12% mucous membrane liquid  haloperidol 2 mg/mL oral concentrate  Ibrance 100 mg oral capsule, 100 mg= 1 cap(s), Oral, Daily  PANTOPRAZOLE SODIUM 40 MG TBEC, 40 mg= 1 tab(s), Oral, Daily  polyethylene glycol 3350 oral powder for reconstitution  SENNA 8.6 MG TABS, 17.2 mg= 2 tab(s), Oral, Daily  Seroquel 50 mg oral tablet, 50 mg= 1 tab(s), Oral  traMADol 50 mg oral tablet, 50 mg= 1 tab(s), Oral, q6hr, PRN  Vitamin K 5 mg Oral Tab, 5 mg, Oral, Once  warfarin 5 mg oral tablet, 5 mg= 1 tab(s), Oral, Daily, 1 refills,? ?Not taking  Xanax 0.5 mg oral tablet, 0.5 mg= 1 tab(s), Oral, TID  Allergies  HYDROcodone?(altered mental state)  Social History  Abuse/Neglect  No, No, Yes, 2019  Alcohol  Past, 2018  Never, 04/15/2017  Employment/School  Retired, 2018  Retired, 2018  Home/Environment  Lives with Alone., 2018  Lives with Alone., 2018  Nutrition/Health  Regular, 2018  Sexual  Sexual orientation: Straight or heterosexual. Gender Identity Identifies as female., 2019  Substance Use  Never, 2018  Never, 04/15/2017  Tobacco  10 or more cigarettes (1/2 pack or more)/day in last 30 days, No, Cigarettes, 10 per day. Household tobacco concerns: No. Smokeless Tobacco Use: Never., 2019  Family History  CAD (coronary artery disease): Mother.  Diabetes mellitus type 2: Mother.  High blood pressure 09-AUG-2016 15:25:48<$>: Mother.  Mesothelioma: Father.  Primary malignant neoplasm of breast:  Other.  Immunizations  Vaccine Date Status   influenza virus vaccine, inactivated 10/12/2018 Given   influenza virus vaccine, inactivated 10/23/2017 Given   Health Maintenance  Health Maintenance  ???Pending?(in the next year)  ??? ??OverDue  ??? ? ? ?Advance Directive due??01/01/19??and every 1??year(s)  ??? ? ? ?Alcohol Misuse Screening due??01/01/19??and every 1??year(s)  ??? ? ? ?Cognitive Screening due??01/01/19??and every 1??year(s)  ??? ? ? ?Functional Assessment due??01/01/19??and every 1??year(s)  ??? ? ? ?Geriatric Depression Screening due??01/01/19??and every 1??year(s)  ??? ? ? ?Smoking Cessation due??01/01/19??and every 1??year(s)  ??? ??Due?  ??? ? ? ?Aspirin Therapy for CVD Prevention due??07/16/19??and every 1??year(s)  ??? ? ? ?Bone Density Screening due??07/16/19??Variable frequency  ??? ? ? ?Breast Cancer Screening (Senior Wellness) due??07/16/19??and every?  ??? ? ? ?Colorectal Screening (Senior Wellness) due??07/16/19??and every?  ??? ? ? ?Hypertension Management-Education due??07/16/19??and every 1??year(s)  ??? ? ? ?Lung Cancer Screening due??07/16/19??and every 1??year(s)  ??? ? ? ?Pneumococcal Vaccine due??07/16/19??Variable frequency  ??? ? ? ?Pneumococcal Vaccine due??07/16/19??and every?  ??? ? ? ?Tetanus Vaccine due??07/16/19??and every 10??year(s)  ??? ? ? ?Zoster Vaccine due??07/16/19??and every 100??year(s)  ??? ??Due In Future?  ??? ? ? ?Fall Risk Assessment not due until??01/01/20??and every 1??year(s)  ??? ? ? ?Obesity Screening not due until??01/01/20??and every 1??year(s)  ??? ? ? ?ADL Screening not due until??06/12/20??and every 1??year(s)  ??? ? ? ?Hypertension Management-Blood Pressure not due until??07/15/20??and every 1??year(s)  ??? ? ? ?Hypertension Management-BMP not due until??07/15/20??and every 1??year(s)  ???Satisfied?(in the past 1 year)  ??? ??Satisfied?  ??? ? ? ?ADL Screening on??06/12/19.??Satisfied by Linh Younger RN  ??? ? ? ?Blood Pressure Screening  on??07/16/19.??Satisfied by Heath Mederos LPN  ??? ? ? ?Body Mass Index Check on??07/16/19.??Satisfied by Heath Mederos LPN  ??? ? ? ?Depression Screening on??07/16/19.??Satisfied by Heath Mederos LPN  ??? ? ? ?Diabetes Screening on??07/16/19.??Satisfied by Cory Ernst Jr.  ??? ? ? ?Fall Risk Assessment on??06/19/19.??Satisfied by Beverly Arevalo RN  ??? ? ? ?Hypertension Management-Blood Pressure on??07/16/19.??Satisfied by Heath Mederos LPN  ??? ? ? ?Influenza Vaccine on??10/12/18.??Satisfied by Mireille Langston LPN  ??? ? ? ?Obesity Screening on??07/16/19.??Satisfied by Heath Mederos LPN  ??? ? ? ?Smoking Cessation on??09/12/18.??Satisfied by Mireille Langston LPN  ?  Lab Results  Test Name Test Result Date/Time   Sodium Lvl 139 mmol/L 07/16/2019 09:36 CDT   Potassium Lvl 4.3 mmol/L 07/16/2019 09:36 CDT   Chloride 108 mmol/L (High) 07/16/2019 09:36 CDT   CO2 29 mmol/L 07/16/2019 09:36 CDT   Calcium Lvl 9.6 mg/dL 07/16/2019 09:36 CDT   Glucose Lvl 154 mg/dL (High) 07/16/2019 09:36 CDT   BUN 12 mg/dL 07/16/2019 09:36 CDT   Creatinine 0.70 mg/dL 07/16/2019 09:36 CDT   eGFR-AA >105 mL/min 07/16/2019 09:36 CDT   eGFR-FLORI 89 mL/min (Low) 07/16/2019 09:36 CDT   Bili Total 0.6 mg/dL 07/16/2019 09:36 CDT   Bili Direct 0.2 mg/dL 07/16/2019 09:36 CDT   Bili Indirect 0.4 mg/dL 07/16/2019 09:36 CDT   AST 17 unit/L 07/16/2019 09:36 CDT   ALT 24 unit/L 07/16/2019 09:36 CDT   Alk Phos 93 unit/L 07/16/2019 09:36 CDT   Total Protein 7.2 gm/dL 07/16/2019 09:36 CDT   Albumin Lvl 2.8 gm/dL (Low) 07/16/2019 09:36 CDT   Globulin 4.40 gm/mL (High) 07/16/2019 09:36 CDT   A/G Ratio 0.6 ratio (Low) 07/16/2019 09:36 CDT   WBC 2.3 x10(3)/mcL (Low) 07/16/2019 09:36 CDT   RBC 3.31 x10(6)/mcL (Low) 07/16/2019 09:36 CDT   Hgb 12.2 gm/dL 07/16/2019 09:36 CDT   Hct 37.3 % 07/16/2019 09:36 CDT   Platelet 129 x10(3)/mcL (Low) 07/16/2019 09:36 CDT   .7 fL (High) 07/16/2019 09:36 CDT   MCH 36.9 pg (High) 07/16/2019 09:36  CDT   MCHC 32.7 gm/dL 07/16/2019 09:36 CDT   RDW 14.6 % (High) 07/16/2019 09:36 CDT   MPV 9.5 fL 07/16/2019 09:36 CDT   Abs Neut 0.94 x10(3)/mcL (Low) 07/16/2019 09:36 CDT   Neutro Auto 41 x10(3)/mcL 07/16/2019 09:36 CDT   Lymph Auto 53 % (High) 07/16/2019 09:36 CDT   Mono Auto 4 % 07/16/2019 09:36 CDT   Eos Auto 0 07/16/2019 09:36 CDT   Abs Eos 0.01 07/16/2019 09:36 CDT   Basophil Auto 1 07/16/2019 09:36 CDT   Abs Neutro 0.94 x10(3)/mcL 07/16/2019 09:36 CDT   Abs Lymph 1.23 x10(3)/mcL 07/16/2019 09:36 CDT   Abs Mono 0.10 x10(3)/mcL 07/16/2019 09:36 CDT   Abs Baso 0.02 x10(3)/mcL 07/16/2019 09:36 CDT   IG% 0 % 07/16/2019 09:36 CDT   IG# 0.0100 07/16/2019 09:36 CDT

## 2022-05-02 NOTE — HISTORICAL [facility]ERNER
This is a historical note converted from Jeanne. Formatting and pictures may have been removed.  Please reference Jeanne for original formatting and attached multimedia. History of Present Illness  ?  History of Present Illness  1. Recurrent metastatic breast cancer ? 4/15/2017?  2. Left breast cancer upper outer quadrant, diagnosed 8-  -Treatment: ?Taxotere[place]toxan [place] on 10/1/09 [place] [facility] [place] 2 [place] of [place])  -Aromasin inhibitor for 1 month. ?Patient did not continue this medication she says because of loss of insurance.?  ??  Treatment History?  Xeloda stopped on 6-8-17 2/2 painful mucositis.  Right thoracentesis on 5/2/17, which showed cytomorphology of adenocarcinoma, consistent with a breast primary.  ?   Reason for visit:  Follow-up for?recurrent metastatic breast cancer  ????  History of present illness:  ?  1. Left breast cancer.? Diagnosed 08/21/2009.?ER positive.? IL positive.? HER-2 negative.? Lumpectomy 08/12/2009.? Mastectomy 09/08/2009 (1.1 cm IDC; high-grade; lymphatic invasion; margin positive). ?Adjuvant?chemotherapy with TC x2 (10/01/2009-01/2010).? Aromatase inhibitor only for 1 month (then, lost her insurance).  ?   --> Metastatic Adenocarcinoma Breast with malignant pleural effusion, mets to liver, bones and peritoneum (4/2017)  ?   -6/7/17: Faslodex started 06/07/2017. ?Ibrance added 09/07/2017.??  ->From?bedbound, extremely poor performance status,?experienced extremely dramatic recovery with Faslodex?plus Ibrance.  -12/20/17, pt continues to have response to chemotherapy. Mets are either stable or decreased in size.?  -3/23/18: Restaging scans ?Stable metastatic disease; CA 27.29 level continues to decline indicating response to treatment  -6/29/18:?CT C/A/P: Unchanged metastasis  -09/04/2018: Nuclear medicine bone scan and restaging CT scans chest/abdomen/pelvis with contrast: Stable metastatic disease.  -CA 27.29 level consistently  dropping  -09/12/2018: ECOG 3 overall, stable  -11/14/2018:?CT head with and without contrast (headaches): No intracranial metastasis  -12/12/2018: Restaging scans:?Stable metastatic disease;?unchanged?left pleural effusion  CA 27.29 level 90.1?on 12/13/2018,?consistently dropping?(was 428?in 01/2018)  -06/10/2019: CT chest with contrast: Right middle and lower lobe pulmonary emboli; continued bilateral pleural effusions; widespread bone metastasis with similar overall appearance since 12/12/2018.  -06/10/2019: CT soft tissues of the neck with contrast: No cervical lymphadenopathy; diffuse bone metastasis.  -06/17/2019:?Restaging CTs C/A/P with contrast?and bone scan: Stable metastatic disease  -10/03/2019: Restaging CT soft tissues of the neck: Extensive spinal metastasis, stable; persistent left pleural effusion; no new change  -09/18/2019: Restaging whole-body bone scan (comparison: 06/17/2019 bone scan): Increased abnormal uptake to the distal left femoral metaphysis may represent progression of disease; otherwise, widespread bone metastasis stable  -09/18/2019: Restaging CTs C/A/P with contrast: Stable exam with bilobar hepatic and diffuse osseous metastatic disease without significant interval change from 06/17/2019  -09/10/2019: CA 27.29 level 118.6 (somewhat on upward trajectory since 06/2019)  -10/07/2019: X-ray left femur: Pelvis is sclerotic, consistent with metastasis or treated metastasis; no fracture of femur; no osseous correlation with increased uptake identified on nuclear medicine bone scan  -10/10/2019: CT left femur with and without contrast: Innumerable, primarily sclerotic bone metastasis present throughout the included portion of the pelvis, lumbar spine, and proximal left femur; no insufficiency or pathologic fracture to femoral neck or left hemipelvis  -12/20/2019: Restaging whole-body nuclear medicine bone scan: Metastatic uptake to the distal femoral, proximal tibia, and inferior sternum  has worsened minimally since prior study 09/18/2019, consistent with a progression of patients bone metastasis; widespread metastatic uptake to the axial and proximal appendicular skeleton is once again noted; thoracolumbar scoliosis  -12/20/2019: Restaging CT C/A/P with contrast (comparison: 09/18/2019): At least 8 new peripheral noncalcified lung nodules are present in the right lower lobe, 3 mm - 28 mm; the microlobulated contour and spiculations are worrisome for metastatic disease and pulmonary metastasis should be considered until proven otherwise; stable left-sided pleural effusion with atelectasis to left lower lobe; hepatic and diffuse osseous metastases are similar when compared to prior study  -01/06/2020: Genetic testing ongoing  -CA 27.29 level: 183.7 (12/05/2019); 153.4 (10/07/2019); 118.6 (09/10/2019); 95.8 (08/13/2019)  -12/20/2019: Hypercalcemia (calcium 10.9, albumin 2.8)  ?  ??????  2. ?Osseous metastatic disease  -continue Tramadol as prescribed prn for pain  -03/20/2019:?Communication from?dentist:?Osteonecrosis of jaw; Xgeva?held?(she never informed us?of?continued jaw pain ever since extraction of teeth?1-1/2 years prior in September/October 2017).?  -09/27/2019: Follow-up with Dr. Jean Claude Ley, oral surgeon: MR PATE stage II with possible secondary infection versus myositis, etc. (presentation with pain entire left side, 8/10, status post Augmentin which worked better than amoxicillin; noncontrast CT ordered)  ?   ??????  3.?Headaches, weakness, ataxia  -CT Head without contrast (unable to obtain IV access)?shows no intracranial abnormality or?no mass effect.  ?  ?   4. ?Ibrance induced neutropenia  -ANC 0.51 on June 13; 0.49 on June 12; 0.77 on June 11; 1.26 on Cassie 10; 1.56 on May 9.? Previously, her ANC dropped to 0.76 on 11/04/2018: 0.79 on 10/16/2018; 0.62 on 10/12/2018, etc.  ?  ?   5. ?Pulmonary embolism  -Incidentally noted?right middle and lower lobe artery emboli?on chest CT  06/10/2019?(no RV strain; echocardiogram normal)  ?  ?  Interval History?  ?   01/09/2020:  -10/07/2019: X-ray left femur: Pelvis is sclerotic, consistent with metastasis or treated metastasis; no fracture of femur; no osseous correlation with increased uptake identified on nuclear medicine bone scan  -10/10/2019: CT left femur with and without contrast: Innumerable, primarily sclerotic bone metastasis present throughout the included portion of the pelvis, lumbar spine, and proximal left femur; no insufficiency or pathologic fracture to femoral neck or left hemipelvis  -12/20/2019: Restaging whole-body nuclear medicine bone scan: Metastatic uptake to the distal femoral, proximal tibia, and inferior sternum has worsened minimally since prior study 09/18/2019, consistent with a progression of patients bone metastasis; widespread metastatic uptake to the axial and proximal appendicular skeleton is once again noted; thoracolumbar scoliosis  -12/20/2019: Restaging CT C/A/P with contrast (comparison: 09/18/2019): At least 8 new peripheral noncalcified lung nodules are present in the right lower lobe, 3 mm - 28 mm; the microlobulated contour and spiculations are worrisome for metastatic disease and pulmonary metastasis should be considered until proven otherwise; stable left-sided pleural effusion with atelectasis to left lower lobe; hepatic and diffuse osseous metastases are similar when compared to prior study  -01/06/2020: Genetic testing ongoing  -CA 27.29 level: 183.7 (12/05/2019); 153.4 (10/07/2019); 118.6 (09/10/2019); 95.8 (08/13/2019)  -12/20/2019: Hypercalcemia (calcium 10.9, albumin 2.8)  -01/09/2020: Calcium 11.0.? Albumin 2.9.? Corrected calcium 11.88 mg/dL.? WBC 3.6.? ANC 1.66.? Hemoglobin 12.2.? Platelets 137,000/mm?.  Presents for follow-up visit, accompanied by female family member. ?In wheelchair as usual.? Feels worse than usual.? Appetite has declined a bit?but she is not willing to try?any appetite  stimulant.? Feels weak and tired; this is chronic.? Pain in hips and lower back for which she takes tramadol.? No fevers or chills. ?No unusual headaches or focal?neurological symptoms. ?No chest pain, cough, or dyspnea at rest. ?No abdominal pain, nausea, or vomiting.  ?  ?  Review of Systems  A complete 12 point ROS was performed in full with pertinent positives as described in interval history. Remainder of the ROS done in full and are negative.  ?  ?   Physical Exam:  VITAL SIGNS: ?Reviewed. ? ?  GENERAL: ?In no apparent distress. ?  HEAD: ?No signs of head trauma.  EYES: ?Pupils are equal. ?Extraocular motions intact. ?  EARS: ?Hearing grossly intact.  MOUTH: ?Oropharynx is normal.?  NECK: ?No adenopathy, no JVD. ??  CHEST: ?Chest with clear breath sounds bilaterally. ?No wheezes, rales, or rhonchi. ?  CARDIAC: ?Regular rate and rhythm. ?S1 and S2, without murmurs, gallops, or rubs.  VASCULAR: ?No Edema. ?Peripheral pulses normal and equal in all extremities.  ABDOMEN: ?Soft, without detectable tenderness. ?No sign of distention. ?No ? rebound or guarding, and no masses palpated. ? Bowel Sounds normal.  MUSCULOSKELETAL: ?Good range of motion of all major joints. Extremities without clubbing, cyanosis or edema. ?  NEUROLOGIC EXAM: ?Alert and oriented x 3. ?No focal sensory or strength deficits. ? Speech normal. ?Follows commands.  PSYCHIATRIC: ?Mood normal.  SKIN: ?No rash or lesions.  ?  ?  Assessment/Plan:  To summarize:  #Left breast cancer.? Diagnosed 08/21/2009. ?ER positive. ?DC positive. ?HER-2 negative.  Lumpectomy 08/12/2009.? Mastectomy 09/08/2009?(1.1 cm tumor; high-grade;?lymphatic invasion; margin positive).  Adjuvant TC x2?(10/01/2009-01/2010)  Adjuvant?aromatase inhibitor?for only 1 month (lost medical insurance)  04/2017: Metastatic disease:?Malignant pleural effusion, liver metastasis, bone metastasis, peritoneal metastasis  06/07/2017:?Faslodex started  09/07/2017:?Palbociclib added  Dramatic  response?with endocrine therapy?(dramatic improvement in performance status)  -12/20/2019:?Worsening of metastatic disease (scans, tumor marker)?(minimally worsened bone metastasis;?at least 8?new peripheral?noncalcified lung nodules right lower lung, 3-28 mm,?microlobulated and spiculated, suspicious for metastasis), etc.  ?  #06/10/2019:?Incidentally detected PE  ?  #Osteonecrosis of jaw; Xgeva held from 03/20/2019  ??  ?  -01/09/2020: Calcium 11.0. ?Albumin 2.9. ?Corrected calcium 11.88 mg/dL. ?WBC 3.6. ?ANC 1.66. ?Hemoglobin 12.2. ?Platelets 137,000/mm?.  ?  ?  ?  Plan:  PIK3CA?mutation?testing is pending.  ?  Noted to be hypercalcemic.  12/20/2019:?Calcium 10.9, albumin 2.8  01/09/2020: Calcium 11.0. ?Albumin 2.9.? Serum 11.88 mg/dL.  Hold calcium and vitamin D supplements.  Today, hydrate with 1 L of saline.  Recheck CMP tomorrow.  At this time, has mild hypercalcemia; hopefully, will improve?with IV fluids?and with stopping calcium and vitamin D supplements.  At this time,?will hold salmon calcitonin.  ?  In view of disease progression, will stop Faslodex and Ibrance.  ?  Will switch to aromatase inhibitor.  Start Arimidex 1 mg p.o. daily.  ?  Continue endocrine therapy until progression or unacceptable toxicity  ?  Since disease has progressed while?on CDK 4/6 inhibitor therapy (Ibrance), therefore,?no indication?of additional line of therapy with another CDK 4/6-containing regimen.  ?  If no clinical benefit after up to 3 sequential endocrine therapy regimens, or if symptomatic visceral disease, then chemotherapy  ?  Monitor CA 27.29 level?every month.  ?  Check for PIK3CA mutation (if positive, then fulvestrant plus alpelisib is another category 1 treatment option).  Result is pending.  ?  Test for germline BRCA1/2 mutation.? If positive, then treatment with PARP inhibitors will be an option (olaparib; talazoparib).  This is ongoing.  ?  Continue?indefinite anticoagulation?for pulmonary emboli  incidentally?detected?on chest CT dated 06/10/2019.  Reports no bleeding.  ?  Xgeva held from 2019?for osteonecrosis of the jaw.  Patient follows up with Dr. Jean Claude Ley, oral surgeon.  Apparently, because of her?being on chronic anticoagulation,?oral surgery was avoided. ?She continues to complain of?pain when eating.  ?  Will assess bone mineral density with DEXA scan at this time.  ?  Restage with contrast-enhanced CT scans of chest, abdomen, and pelvis, and whole-body nuclear medicine bone scan in 3 months (2020).  ?  Patient remains under the care of home hospice?to help her with her daily needs.  ?  Follow-up visit?with NP?in 2 weeks, with CBC and CMP, to check tolerance to Arimidex as well as?to monitor for any recurrence of hypercalcemia.  ?  Above discussed at length with her. ?All questions answered. ?Went over labs and scans and gave her copies for her record. ?She understands and agrees with this plan.  Physical Exam  Vitals & Measurements  T:?37.0? ?C (Oral)? HR:?102(Peripheral)? RR:?20? BP:?115/79? SpO2:?96%?  HT:?160?cm? WT:?67.6?kg? BMI:?26.41?   Problem List/Past Medical History  Ongoing  BMI 26.0-26.9,adult  Bone metastases  Chemotherapy induced neutropenia  Hypertension  Impaired mobility  Invasive ductal carcinoma of left breast  Liver cancer, primary, with metastasis from liver to other site  Metastatic breast cancer  Tobacco user  Tobacco user  Historical  Breast ca  Cancer, metastatic  Hepatic neoplasm secondary  New onset of headaches  Obesity  Terminal Illness  Unsteady gait  Procedure/Surgical History  Drainage of Right Pleural Cavity with Drainage Device, Percutaneous Approach (2017)  Thoracentesis, needle or catheter, aspiration of the pleural space; with imaging guidance (2017)  Mastectomy ()  Appendectomy;  back surgery    Mastectomy   Medications  acetaminophen-codeine #4, Oral, q6hr  amLODIPine 5 mg oral tablet, 5 mg= 1 tab(s), Oral,  Daily  calcium (as carbonate)-vitamin D 500 mg-400 intl units oral tablet, 1 tab(s), Oral, BID, 11 refills  dexamethasone 1 mg oral tablet, 1 mg= 1 tab(s), Oral, BID  haloperidol 1 mg oral tablet, 1 mg= 1 tab(s), Oral, TID  Ibrance 75 mg oral capsule, 75 mg= 1 cap(s), Oral, Daily  LORazepam 1 mg oral tablet, 1 mg= 1 tab(s), Oral, TID  PANTOPRAZOLE SODIUM 40 MG TBEC, 40 mg= 1 tab(s), Oral, Daily  polyethylene glycol 3350 oral powder for reconstitution  QUEtiapine 25 mg oral tablet, 25 mg= 1 tab(s), Oral, TID  SENNA 8.6 MG TABS, 17.2 mg= 2 tab(s), Oral, Daily  traMADol 50 mg oral tablet, 50 mg= 1 tab(s), Oral, q6hr, PRN  Xarelto 15mg Tablet, 15 mg= 1 tab(s), Oral, qPM  Allergies  HYDROcodone?(altered mental state)  Social History  Abuse/Neglect  No, 01/03/2020  Alcohol  Past, 12/17/2018  Never, 04/15/2017  Employment/School  Retired, 05/03/2018  Exercise  Exercise frequency: Daily. Exercise type: Walking., 10/16/2019  Home/Environment  Lives with Children. Living situation: Home/Independent. Oxygen, Walker/Cane, Wheelchair, TV/Computer concerns: No. Single family house, Risks in environment: Pets/Animal exposure., 10/16/2019  Nutrition/Health  Regular, 05/03/2018  Sexual  Sexual orientation: Straight or heterosexual. Gender Identity Identifies as female., 03/12/2019  Spiritual/Cultural  Yarsanism, 10/16/2019  Substance Use  Never, 04/15/2017  Tobacco  10 or more cigarettes (1/2 pack or more)/day in last 30 days, No, 01/03/2020  Family History  CAD (coronary artery disease): Mother.  Diabetes mellitus type 2: Mother.  High blood pressure 09-AUG-2016 15:25:48<$>: Mother.  Mesothelioma: Father.  Primary malignant neoplasm of breast: Other.  Immunizations  Vaccine Date Status   influenza virus vaccine, inactivated 10/09/2019 Given   influenza virus vaccine, inactivated 10/12/2018 Given   influenza virus vaccine, inactivated 10/23/2017 Given

## 2022-05-02 NOTE — HISTORICAL [facility] CERNER
This is a historical note converted from [place]. Formatting and pictures may have been removed.  Please reference Jeanne for original formatting and attached multimedia. Chief Complaint  breast cancer  History of Present Illness  Problem List:  1. Recurrent metastatic breast cancer ? 4/15/2017?  2. Left breast cancer upper outer quadrant, diagnosed 8-  ?  Current Treatment:  Faslodex monthly. Started on 6/6/2017.?Next dose due 458/19.  Ibrance 125 mg p.o. daily with food for 21 days followed by 7 days off. Started 9/20/2017. Next cycle starts 5/16/19.  Xgeva subcu monthly. Started 11/3/2017. Holding for treatment of ONJ.  ?   Treatment History:?  S/p left lumpectomy 8/12/2009  S/p Left mastectomy 9/8/2009  [place]otere[place] on 10/1/09 [place] [facility] [place] 2 [place] of [place]  -Aromasin inhibitor for 1 month. ?Patient did not continue this medication she says because of loss of insurance.?  Xeloda stopped on 6-8-17 2/2 painful mucositis.  Right thoracentesis on 5/2/17, which showed cytomorphology of adenocarcinoma, consistent with a breast primary.  ?   History of Present Illness  Left breast cancer upper outer quadrant?ER 90%, MA 15%, Her 2 +1 by IHC, ki 67- 25%  ?Surgery: left lumpectomy 8/12/2009. ?Pathology: 1.2 cm invasive ductal ca, grade 2. ?Lymphovascular invasion, positive margin  Surgery: left mastectomy ?9/8/2009. ?Pathology: ?invasive ductal carcinoma largest tumor nodule 1.1, smaller nodule 0.5 cm. ?DCIS + high grade lymphatic invasion: NO. Perineural invasion: NO.?  Margin: Positive to ink margin of largest 0 of 21 nodes ER +/MA +, ? HER2 1+ by IHC. Stage: unknown. ?Treatment: Taxotere/Cytoxan beginning on 10/1/09 [place] [facility] [place] reports 2 rounds of chemo). ?Aromasin inhibitor for 1 month. ?Patient did not continue this medication she says because of loss of insurance.?  ?   1/2010: Right mastectomy at age 54 followed by  reconstruction.  ?   ?She presented to the ER on 4/15/17 with three-month history of left upper quadrant pain, nausea/vomiting and progressive shortness of breath. Past medical history significant for heavy smoking.?  ?CT imaging identified extensive metastatic disease. She underwent a right thoracentesis on 5/2/17, which showed cytomorphology of adenocarcinoma, consistent with a breast primary.  ????  Recurrence:??CT chest/abd/pelvis 4/15/2017 -subcarinal node 2.1 x 3.0 cm, Right hilar lymph node 1.6 x 1.5 cm. Extensive left internal mammary lymphadenopathy ?3.6 x 4.4 cm enlarged internal mammary and left superior mediastinal lymph nodes as well. Right anterior mediastinal lymph nodes 1.0 x 1.5 cm. ?Right para-aortic lymph node ?1.2 x 2.2 cm. Multiple enlarged paracardiac/cardiophrenic lymph nodes 1.7 x 2.0 cm. Large right and moderate left pleural effusions. ?Multifocal right pleural soft tissue nodularity 2.4 x 1.7 cm. Numerous pulmonary nodules. Extensive osseous metastatic disease with involvement of the thoracic spine, sternum, and suspected involvement of several ribs. Innumerable hepatic metastatic lesions 4.0 x 3.6 cm and 3.1x 2.6 cm. Multiple small retroperitoneal nodes. Metastatic deposit in the retroperitoneum lateral to the right kidney 2.1 x 2.7 cm. Xeloda stopped on 6-8-17 2/2 painful mucositis. Started Faslodex on 06/07/2014, with dramatic turn around in her general health.??  ?  3/13/19: This patient follows up for 1 month follow up. She has been stable on Ibrance and faslodex. She has no new or worsening complaints. Has a relatively good performance status. She has no intolerable side effects or toxicities to treatment.  ?   4/9/19: Patient presents for follow up on Ibrance/Faslodex/Xgeva; she is scheduled to start her week off on Thursday 4/11/19. She complains of weakness/fatigue and?constant bilateral hip pain 5/10, neither of which are new symptoms. Her K+ is low at 3.4; she states she stopped  taking her potassium. She likes bananas; advised her to eat at least one banana a day. WBC 3.9; ANC 2.02. H&H are normal at 13.3 & 38.4. Apparently, the patient was experiencing tooth pain and pain with eating for 18months; her sister states she says good to everything when she goes to doctor appointments. Xgeva was placed on hold once the Oncology clinic was informed of her symptoms. Her dentist is planning surgery; she does not know what surgery is being planned. She was recently placed on antibiotics. Her appetite is so-so per her report; however, since being placed on antibiotics, pain with eating has resolved. She does not want an appetite stimulant. She has dry skin across her chest; advised her to use Eucerin or CeraVe. Will have her follow up in 4 weeks with labs on her off week which is between 5/9/19 and 5/15/19. She is amenable to this plan.  ?  Interval History  5/9/19: Patient presents for follow up on Ibrance/Faslodex; she is scheduled to receive Faslodex tomorrow and scheduled to start her next cycle of Ibrance on 5/16/19. She complains of weakness in bilateral legs that started yesterday and hip pain 6/10. She presents with her daughter who is most concerned about the patient getting her Percocet refilled by this clinic. Records indicate she last got Percocet refilled by a provider here at [facility] but not in Oncology, and this was 3/29/18; over a year ago. She has been getting Tramadol regularly from Dr. Hutson in [place] and alprazolam from Dr. Sol in [place]. No one in Oncology has prescribed any controlled substance for her since January 2018. She complains of hip pain and is on Xgeva; however, it is on hold for ONJ. She sees her dentist and oral surgeon on the 28th to discuss her treatment plan. Bone scans from 9/2018 indicate healing of bone mets in the bony pelvis; subsequent bone scans in 12/2018 indicate no metastatic bone lesions. Therefore, her hip pain is likely not due to cancer.  Advised her to see her PCP; she states she sees [facility] but has not seen her PCP in 1.5 years. Our records indicate as such. Im not sure of the relationship between the patient and Dr. Hutson or [facility]; however, I will refer her back to [facility] to re-establish care. AST 13, ALT 27, Alk Phos 91. ANC 1560. Will have her follow up in 4 weeks on her off week with labs. She will need to see Dr. Amos in 8 weeks to review scans.  Review of Systems  A complete 12-point?ROS was performed in full with pertinent positives as described in interval history. Remainder of ROS done in full and are negative.  Physical Exam  Vitals & Measurements  T:?37.0? ?C (Oral)? HR:?84(Peripheral)? RR:?20? BP:?118/83?  HT:?160?cm? WT:?64.6?kg? BMI:?25.23?  General: ?Alert and oriented, No acute distress.??  Appearance: Well-groomed  HEENT: ?Normocephalic, Oral mucosa is moist.?Pupils are equal, round and reactive to light, Extraocular movements are intact, Normal conjunctiva.?  Neck: ?Supple, Non-tender, No lymphadenopathy, No thyromegaly.??  Respiratory: ?Lungs are clear to auscultation, Respirations are non-labored, Breath sounds are equal, Symmetrical chest wall expansion.??  Cardiovascular: ?Normal rate, Regular rhythm, No edema.??  Breast:??Breast exam not performed on todays visit.??  Gastrointestinal: ?Soft, Non-tender, Non-distended, Normal bowel sounds.??  Musculoskeletal: ?Normal strength.??  Integumentary: ?Warm, dry, intact.??  Neurologic: ?Alert, Oriented, No focal deficits, Cranial Nerves II-XII are grossly intact.??  Cognition and Speech: ?Oriented, Speech clear and coherent.??  Psychiatric: ?Cooperative, Appropriate mood & affect. ?  ECOG Performance Scale: 2 - Capable of all self-care but unable to carry out any work activities. Up and about greater than 50 percent of waking hours.?  Assessment/Plan  1.?Breast ca?C50.412  ?Assessment:  ?1.?Malignant neoplasm of upper-outer quadrant of left female  breast?C50.412  ???Metastatic Left breast cancer.? Diagnosed 08/21/2009.?ER positive.? [place] positive.? HER-2 negative.? Lumpectomy 08/12/2009.? Mastectomy 09/08/2009 (1.1 cm IDC; high-grade lymphatic invasion; margin positive). ?Adjuvant?chemotherapy with TC x2 (10/01/2009-01/2010).? Aromatase inhibitor only for 1 month (then, lost her insurance).  ?   ?2017: Metastatic Adenocarcinoma Breast with malignant pleural effusion, mets to liver, bones and peritoneum (4/2017)  -6/7/17: Faslodex started 06/07/2017. ?Ibrance added 09/07/2017.??  ->From?bedbound, extremely poor performance status,?experienced extremely dramatic recovery with Faslodex.  -12/20/17, pt continues to have response to chemotherapy. Mets are either stable or decreased in size.?  -3/23/18: Restaging scans ?Stable metastatic disease; CA 27.29 level continues to decline indicating response to treatment  -6/29/18:?CT C/A/P: No changes in hepatic and osseous metastatic disease since 3/23/18. Similar moderate left pleural effusion. 3 mm right upper lobe nodule is not definitively seen before, but is nonspecific. Suggest attention on follow-up studies. Unchanged extensive skeletal metastatic involvement.  -09/04/2018: Nuclear medicine bone scan and restaging CT scans chest/abdomen/pelvis with contrast: Stable metastatic disease.  -CA 27.29 level consistently dropping  -09/12/2018: ECOG 3 overall, stable  -11/14/2018:?CT head with and without contrast (headaches): No intracranial metastasis  -12/12/2018: Restaging scans:?Stable metastatic disease;?unchanged?left pleural effusion  -12/12/18: Bone scan: No evidence of metastatic disease  ?   ?Plan:  ?-Stable metastatic disease, without worsening,?on current regimen of Faslodex plus Ibrance.  -Reasonably satisfactory functional status, varying between ECOG 1 and ECOG 2  ?-Current treatment: Faslodex and Ibrance as endocrine therapy for metastatic breast cancer; has had very good response, followed by stable  metastatic disease.?  -Continue to follow CA 27.29 level monthly.  -Calcium plus vitamin D twice a day with Xgeva?to prevent hypocalcemia  ?   -Apparently,?we are experiencing considerable?difficulty?drawing her blood from labs. ?She has to be stuck multiple times. ?Therefore, we have decided?to space out restaging scans?to every 6 months?unless?absolutely needed?at shorter time intervals. ?She completely agrees?enthusiastically.  -->Restage with contrast enhanced CT scans of chest/abdomen/pelvis and whole-body nuclear medicine bone scan in 6 months?(2019);?earlier,?if necessitated?by?symptoms, signs,?or worsening labs.  ?   Ok to proceed with Faslodex. Next?dose due?5/10/19.  Ok to proceed with Ibrance. Next cycle starts 19.  Follow up in 4 weeks (on her off week between 19 and 19) with CBC, CMP, CA 27-29.  CTs and bone scan scheduled for 19.  Follow up in 8 weeks with Dr. Amos to review restaging scans.  Ordered:  ?  2.?Bone metastases?C79.51  Bone metastases  Osseous metastatic disease  -continue Tramadol as prescribed prn for pain  ?   ?Xgeva on hold for treatment of ONJ by patients dentist.  Continue Calcium + Vitamin D twice daily.?  Ordered:  ?   Problem List/Past Medical History  Ongoing  BMI 26.0-26.9,adult  Bone metastases  Breast ca  Cancer, metastatic  Hepatic neoplasm secondary  Hypertension  Invasive ductal carcinoma of left breast  Liver cancer, primary, with metastasis from liver to other site  New onset of headaches  Obesity  Terminal Illness  Tobacco user  Tobacco user  Unsteady gait  Historical  No qualifying data  Procedure/Surgical History  Drainage of Right Pleural Cavity with Drainage Device, Percutaneous Approach (2017)  Thoracentesis, needle or catheter, aspiration of the pleural space; with imaging guidance (2017)  Mastectomy ()  Appendectomy;  back surgery    Mastectomy   Medications  amlodipine 5 mg oral tablet, 5 mg= 1 tab(s), Oral,  Daily  calcium (as carbonate)-vitamin D 500 mg-400 intl units oral tablet, 1 tab(s), Oral, BID, 11 refills  dexamethasone 1 mg oral tablet, 1 tab, Oral, Daily  haloperidol 1 mg oral tablet, 1 mg= 1 tab(s), Oral, q4hr, PRN,? ?Not taking  haloperidol 2 mg/mL oral concentrate  Ibrance 125 mg oral capsule, 125 mg= 1 cap(s), Oral, Daily, 4 refills  PANTOPRAZOLE SODIUM 40 MG TBEC, 40 mg= 1 tab(s), Oral, Daily  Percocet 5/325 oral tablet, 1 tab(s), Oral, q6hr, PRN,? ?Not taking  polyethylene glycol 3350 oral powder for reconstitution  Protonix 20 mg ORAL enteric coated tablet, 20 mg= 1 tab(s), Oral, Daily,? ?Not Taking per Prescriber  QUETIAPINE FUMARATE 25 MG TABS, 25 mg= 1 tab(s), Oral, qPM,? ?Not taking  SENNA 8.6 MG TABS, 17.2 mg= 2 tab(s), Oral, Daily  Seroquel 50 mg oral tablet, 50 mg= 1 tab(s), Oral  traMADol 50 mg oral tablet, 50 mg= 1 tab(s), Oral, q6hr, PRN  Xanax 0.5 mg oral tablet, 0.5 mg= 1 tab(s), Oral, TID  Allergies  HYDROcodone?(altered mental state)  Social History  Alcohol  Past, 12/17/2018  Never, 04/15/2017  Employment/School  Retired, 12/17/2018  Retired, 05/03/2018  Home/Environment  Lives with Alone., 12/17/2018  Lives with Alone., 05/03/2018  Nutrition/Health  Regular, 05/03/2018  Sexual  Sexual orientation: Straight or heterosexual. Gender Identity Identifies as female., 03/12/2019  Substance Abuse  Never, 12/17/2018  Never, 04/15/2017  Tobacco  10 or more cigarettes (1/2 pack or more)/day in last 30 days, No, 05/09/2019  Family History  CAD (coronary artery disease): Mother.  Diabetes mellitus type 2: Mother.  High blood pressure 09-AUG-2016 15:25:48<$>: Mother.  Mesothelioma: Father.  Primary malignant neoplasm of breast: Other.  Immunizations  Vaccine Date Status   influenza virus vaccine, inactivated 10/12/2018 Given   influenza virus vaccine, inactivated 10/23/2017 Given   Health Maintenance  Health Maintenance  ???Pending?(in the next year)  ??? ??OverDue  ??? ? ? ?Advance Directive  due??01/01/19??and every 1??year(s)  ??? ? ? ?Alcohol Misuse Screening due??01/01/19??and every 1??year(s)  ??? ? ? ?Cognitive Screening due??01/01/19??and every 1??year(s)  ??? ? ? ?Functional Assessment due??01/01/19??and every 1??year(s)  ??? ? ? ?Geriatric Depression Screening due??01/01/19??and every 1??year(s)  ??? ? ? ?Smoking Cessation due??01/01/19??and every 1??year(s)  ??? ??Due?  ??? ? ? ?Aspirin Therapy for CVD Prevention due??05/09/19??and every 1??year(s)  ??? ? ? ?Bone Density Screening due??05/09/19??Variable frequency  ??? ? ? ?Breast Cancer Screening (Senior Wellness) due??05/09/19??and every?  ??? ? ? ?Colorectal Screening (Senior Wellness) due??05/09/19??and every?  ??? ? ? ?Hypertension Management-Education due??05/09/19??and every 1??year(s)  ??? ? ? ?Lung Cancer Screening due??05/09/19??and every 1??year(s)  ??? ? ? ?Pneumococcal Vaccine due??05/09/19??Variable frequency  ??? ? ? ?Pneumococcal Vaccine due??05/09/19??and every?  ??? ? ? ?Tetanus Vaccine due??05/09/19??and every 10??year(s)  ??? ? ? ?Zoster Vaccine due??05/09/19??and every 100??year(s)  ??? ??Due In Future?  ??? ? ? ?Fall Risk Assessment not due until??01/01/20??and every 1??year(s)  ??? ? ? ?Obesity Screening not due until??01/01/20??and every 1??year(s)  ??? ? ? ?ADL Screening not due until??02/12/20??and every 1??year(s)  ??? ? ? ?Hypertension Management-Blood Pressure not due until??05/08/20??and every 1??year(s)  ??? ? ? ?Hypertension Management-BMP not due until??05/08/20??and every 1??year(s)  ???Satisfied?(in the past 1 year)  ??? ??Satisfied?  ??? ? ? ?ADL Screening on??02/12/19.??Satisfied by Mireille Langston LPN  ??? ? ? ?Blood Pressure Screening on??05/09/19.??Satisfied by Mireille Langston LPN  ??? ? ? ?Body Mass Index Check on??05/09/19.??Satisfied by Mireille Langston LPN  ??? ? ? ?Depression Screening on??04/09/19.??Satisfied by Heath Mederos LPN  ??? ? ? ?Diabetes Screening on??05/09/19.??Satisfied by  Paulina Luu  ??? ? ? ?Fall Risk Assessment on??04/10/19.??Satisfied by Beverly Arevalo RN  ??? ? ? ?Hypertension Management-Blood Pressure on??05/09/19.??Satisfied by Dickey LPN, Beonka S  ??? ? ? ?Influenza Vaccine on??10/12/18.??Satisfied by Dickey LPN, Beonka S  ??? ? ? ?Obesity Screening on??05/09/19.??Satisfied by Dickey LPN, Beonka S  ??? ? ? ?Smoking Cessation on??09/12/18.??Satisfied by Dickey LPN, Beonka S  ?  ?  Lab Results  Test Name Test Result Date/Time   Sodium Lvl 140 mmol/L 05/09/2019 09:41 CDT   Potassium Lvl 3.8 mmol/L 05/09/2019 09:41 CDT   Chloride 110 mmol/L (High) 05/09/2019 09:41 CDT   CO2 22 mmol/L 05/09/2019 09:41 CDT   Calcium Lvl 9.2 mg/dL 05/09/2019 09:41 CDT   Glucose Lvl 140 mg/dL (High) 05/09/2019 09:41 CDT   BUN 18 mg/dL 05/09/2019 09:41 CDT   Creatinine 0.80 mg/dL 05/09/2019 09:41 CDT   eGFR-AA 93 mL/min 05/09/2019 09:41 CDT   eGFR-FLORI 77 mL/min (Low) 05/09/2019 09:41 CDT   Bili Total 0.3 mg/dL 05/09/2019 09:41 CDT   Bili Direct 0.1 mg/dL 05/09/2019 09:41 CDT   Bili Indirect 0.2 mg/dL 05/09/2019 09:41 CDT   AST 13 unit/L (Low) 05/09/2019 09:41 CDT   ALT 27 unit/L 05/09/2019 09:41 CDT   Alk Phos 91 unit/L 05/09/2019 09:41 CDT   Total Protein 6.8 gm/dL 05/09/2019 09:41 CDT   Albumin Lvl 3.0 gm/dL (Low) 05/09/2019 09:41 CDT   Globulin 3.80 gm/mL (High) 05/09/2019 09:41 CDT   A/G Ratio 0.8 ratio (Low) 05/09/2019 09:41 CDT   WBC 2.9 x10(3)/mcL (Low) 05/09/2019 09:41 CDT   RBC 3.22 x10(6)/mcL (Low) 05/09/2019 09:41 CDT   Hgb 13.1 gm/dL 05/09/2019 09:41 CDT   Hct 36.9 % 05/09/2019 09:41 CDT   Platelet 74 x10(3)/mcL (Low) 05/09/2019 09:41 CDT   .6 fL (High) 05/09/2019 09:41 CDT   MCH 40.7 pg (High) 05/09/2019 09:41 CDT   MCHC 35.5 gm/dL 05/09/2019 09:41 CDT   RDW 14.7 % (High) 05/09/2019 09:41 CDT   MPV 10.7 fL (High) 05/09/2019 09:41 CDT   Abs Neut 1.56 x10(3)/mcL (Low) 05/09/2019 09:41 CDT   Segs Man 58 % 05/09/2019 09:41 CDT   Band Man 8 % 05/09/2019 09:41 CDT   Lymph  Man 32.0 % 05/09/2019 09:41 CDT   Monocyte Man 0 % (Low) 05/09/2019 09:41 CDT   Eos Man 0 % 05/09/2019 09:41 CDT   Basophil Man 0 % 05/09/2019 09:41 CDT   Abn Lymph Man 2 % (High) 05/09/2019 09:41 CDT   Platelet Est Decreased 05/09/2019 09:41 CDT   Anisocyte 1+ 05/09/2019 09:41 CDT   Polychrom 1+ 05/09/2019 09:41 CDT   RBC Morph Abnormal 05/09/2019 09:41 CDT

## 2022-05-02 NOTE — HISTORICAL [facility]ERNER
This is a historical note converted from Jeanne. Formatting and pictures may have been removed.  Please reference Jeanne for original formatting and attached multimedia. History of Present Illness  ?  History of Present Illness  1. Recurrent metastatic breast cancer ? 4/15/2017?  2. Left breast cancer upper outer quadrant, diagnosed 8-  -Treatment: ?Taxotere[place]toxan [place] on 10/1/09 [place] [facility] [place] 2 [place] of [place])  -Aromasin inhibitor for 1 month. ?Patient did not continue this medication she says because of loss of insurance.?  ??  Treatment History?  Xeloda stopped on 6-8-17 2/2 painful mucositis.  Right thoracentesis on 5/2/17, which showed cytomorphology of adenocarcinoma, consistent with a breast primary.  ?   Reason for visit:  Follow-up for?recurrent metastatic breast cancer  ????  History of present illness:  ?   1. Left breast cancer.? Diagnosed 08/21/2009.?ER positive.? NH positive.? HER-2 negative.? Lumpectomy 08/12/2009.? Mastectomy 09/08/2009 (1.1 cm IDC; high-grade; lymphatic invasion; margin positive). ?Adjuvant?chemotherapy with TC x2 (10/01/2009-01/2010).? Aromatase inhibitor only for 1 month (then, lost her insurance).  ?   --> Metastatic Adenocarcinoma Breast with malignant pleural effusion, mets to liver, bones and peritoneum (4/2017)  ?   -6/7/17: Faslodex started 06/07/2017. ?Ibrance added 09/07/2017.??  ->From?bedbound, extremely poor performance status,?experienced extremely dramatic recovery with Faslodex?plus Ibrance.  -12/20/17, pt continues to have response to chemotherapy. Mets are either stable or decreased in size.?  -3/23/18: Restaging scans ?Stable metastatic disease; CA 27.29 level continues to decline indicating response to treatment  -6/29/18:?CT C/A/P: Unchanged metastasis  -09/04/2018: Nuclear medicine bone scan and restaging CT scans chest/abdomen/pelvis with contrast: Stable metastatic disease.  -CA 27.29 level consistently  dropping  -09/12/2018: ECOG 3 overall, stable  -11/14/2018:?CT head with and without contrast (headaches): No intracranial metastasis  -12/12/2018: Restaging scans:?Stable metastatic disease;?unchanged?left pleural effusion  CA 27.29 level 90.1?on 12/13/2018,?consistently dropping?(was 428?in 01/2018)  -06/10/2019: CT chest with contrast: Right middle and lower lobe pulmonary emboli; continued bilateral pleural effusions; widespread bone metastasis with similar overall appearance since 12/12/2018.  -06/10/2019: CT soft tissues of the neck with contrast: No cervical lymphadenopathy; diffuse bone metastasis.  -06/17/2019:?Restaging CTs C/A/P with contrast?and bone scan: Stable metastatic disease  -10/03/2019: Restaging CT soft tissues of the neck: Extensive spinal metastasis, stable; persistent left pleural effusion; no new change  -09/18/2019: Restaging whole-body bone scan (comparison: 06/17/2019 bone scan): Increased abnormal uptake to the distal left femoral metaphysis may represent progression of disease; otherwise, widespread bone metastasis stable  -09/18/2019: Restaging CTs C/A/P with contrast: Stable exam with bilobar hepatic and diffuse osseous metastatic disease without significant interval change from 06/17/2019  -09/10/2019: CA 27.29 level 118.6 (somewhat on upward trajectory since 06/2019)  -10/07/2019: X-ray left femur: Pelvis is sclerotic, consistent with metastasis or treated metastasis; no fracture of femur; no osseous correlation with increased uptake identified on nuclear medicine bone scan  -10/10/2019: CT left femur with and without contrast: Innumerable, primarily sclerotic bone metastasis present throughout the included portion of the pelvis, lumbar spine, and proximal left femur; no insufficiency or pathologic fracture to femoral neck or left hemipelvis  -12/20/2019: Restaging whole-body nuclear medicine bone scan: Metastatic uptake to the distal femoral, proximal tibia, and inferior sternum  has worsened minimally since prior study 09/18/2019, consistent with a progression of patients bone metastasis; widespread metastatic uptake to the axial and proximal appendicular skeleton is once again noted; thoracolumbar scoliosis  -12/20/2019: Restaging CT C/A/P with contrast (comparison: 09/18/2019): At least 8 new peripheral noncalcified lung nodules are present in the right lower lobe, 3 mm - 28 mm; the microlobulated contour and spiculations are worrisome for metastatic disease and pulmonary metastasis should be considered until proven otherwise; stable left-sided pleural effusion with atelectasis to left lower lobe; hepatic and diffuse osseous metastases are similar when compared to prior study  -01/06/2020: Genetic testing ongoing  -CA 27.29 level: 183.7 (12/05/2019); 153.4 (10/07/2019); 118.6 (09/10/2019); 95.8 (08/13/2019)  -12/20/2019: Hypercalcemia (calcium 10.9, albumin 2.8)  ?   ??????  2. ?Osseous metastatic disease  -continue Tramadol as prescribed prn for pain  -03/20/2019:?Communication from?dentist:?Osteonecrosis of jaw; Xgeva?held?(she never informed us?of?continued jaw pain ever since extraction of teeth?1-1/2 years prior in September/October 2017).?  -09/27/2019: Follow-up with Dr. Jean Claude Ley, oral surgeon: MR PATE stage II with possible secondary infection versus myositis, etc. (presentation with pain entire left side, 8/10, status post Augmentin which worked better than amoxicillin; noncontrast CT ordered)  ?   ??????  3.?Headaches, weakness, ataxia  -CT Head without contrast (unable to obtain IV access)?shows no intracranial abnormality or?no mass effect.  ?  ?  4. ?Ibrance induced neutropenia  -ANC 0.51 on June 13; 0.49 on June 12; 0.77 on June 11; 1.26 on Cassie 10; 1.56 on May 9.? Previously, her ANC dropped to 0.76 on 11/04/2018: 0.79 on 10/16/2018; 0.62 on 10/12/2018, etc.  ?  ?  5. ?Pulmonary embolism  -Incidentally noted?right middle and lower lobe artery emboli?on chest CT  06/10/2019?(no RV strain; echocardiogram normal)  ?  ?  Interval History?  ?  02/26/2020:  -Found to be hypokalemic, potassium 2.9, on 01/30/2020.? Potassium supplementation increased to 20 mEq p.o. twice daily  -01/06/2020: Genetic testing negative for deleterious mutation and any variants of uncertain significance  -On 01/28/2020, we ordered calcitonin 260 units subcu x2 doses for hypercalcemia  -01/30/2020: CA 27.29 level 219.8, rising (was 95.4 on 07/16/2019)  -Remains hypercalcemic as of 01/30/2020 (calcium 10.4, albumin 2.9)  -02/26/2020: WBC 7.7.? Differential count normal.? Hemoglobin 12.2.? Platelets 275,000/mm?. Calcium 11.1.? Albumin 2.8.? Rest of CMP essentially within acceptable limits.  Presents for follow-up visit, accompanied by female family member.? No change in?health status.? No change in symptoms.? Generalized weakness, fatigue, dental problems, and nausea.? Constant pain?in hips?and bilateral?lower back, 6/10, for which she is on narcotics.? No unusual headaches or focal neurological symptoms. ?No chest pain or dyspnea. ?No abdominal pain, nausea, or vomiting.? Appetite is fair at baseline.  ?  ?  Review of Systems  A complete 12 point ROS was performed in full with pertinent positives as described in interval history. Remainder of the ROS done in full and are negative.  ?  ?  Physical Exam:  VITAL SIGNS: ?Reviewed. ? ?  GENERAL: ?In no apparent distress. ?  HEAD: ?No signs of head trauma.  EYES: ?Pupils are equal. ?Extraocular motions intact. ?  EARS: ?Hearing grossly intact.  MOUTH: ?Oropharynx is normal.?  NECK: ?No adenopathy, no JVD. ??  CHEST: ?Chest with clear breath sounds bilaterally. ?No wheezes, rales, or rhonchi. ?  CARDIAC: ?Regular rate and rhythm. ?S1 and S2, without murmurs, gallops, or rubs.  VASCULAR: ?No Edema. ?Peripheral pulses normal and equal in all extremities.  ABDOMEN: ?Soft, without detectable tenderness. ?No sign of distention. ?No ? rebound or guarding, and no  masses palpated. ? Bowel Sounds normal.  MUSCULOSKELETAL: ?Good range of motion of all major joints. Extremities without clubbing, cyanosis or edema. ?  NEUROLOGIC EXAM: ?Alert and oriented x 3. ?No focal sensory or strength deficits. ? Speech normal. ?Follows commands.  PSYCHIATRIC: ?Mood normal.  SKIN: ?No rash or lesions.  ?  ?  Assessment/Plan:  To summarize:  #Left breast cancer.? Diagnosed 08/21/2009. ?ER positive. ?DE positive. ?HER-2 negative.  Lumpectomy 08/12/2009).? Mastectomy (09/08/2009)?(1.1 cm tumor; high-grade;?lymphatic invasion; margin positive).  Adjuvant TC x2?(10/01/2009-01/2010)  Adjuvant?aromatase inhibitor?for only 1 month (lost medical insurance)  04/2017: Metastatic disease:?Malignant pleural effusion, liver metastasis, bone metastasis, peritoneal metastasis  06/07/2017:?Faslodex started  09/07/2017:?Palbociclib added  Dramatic response?with endocrine therapy?(dramatic improvement in performance status)  -12/20/2019:?Worsening of metastatic disease (scans, tumor marker)?(minimally worsened bone metastasis;?at least 8?new peripheral?noncalcified lung nodules right lower lung, 3-28 mm,?microlobulated and spiculated, suspicious for metastasis), etc.  -Arimidex (second line endocrine therapy) started 01/09/2020  ?  #Hypercalcemia?  ?  #Genetic testing negative (01/06/2020)  ?  #06/10/2019:?Incidentally detected PE  ?  #Osteonecrosis of jaw; Xgeva held from 03/20/2019  ?  ?  Plan:  Apparently, she missed her appointment for DEXA scan.  We will reschedule.  (She is on?palliative endocrine therapy with Arimidex which can cause bone demineralization).  ?  PIK3CA?mutation?testing is pending.  We will also check for NTRK gene fusion and MSI-H/dMMR?status of the tumor  ?  Noted to be hypercalcemic.  Cannot give Xgeva?or bisphosphonate?(osteonecrosis of the jaw).  Treated with IV fluids and?salmon calcitonin  ?  Advised her to to drink plenty of fluids?for hypercalcemia.  To report to emergency  department?if she starts becoming confused.  ?  Check intact PTH level?and PTH related peptide.  ?  In view of disease progression, we stopped?Faslodex and Ibrance.  Switched to aromatase inhibitor?(second line hormone therapy)?from 01/09/2020  Arimidex 1 mg p.o. daily.  ?  Continue endocrine therapy until progression or unacceptable toxicity  ?  Since disease has progressed while?on CDK 4/6 inhibitor therapy (Ibrance), therefore,?no indication?of additional line of therapy with another CDK 4/6-containing regimen.  ?  If no clinical benefit after up to 3 sequential endocrine therapy regimens, or if symptomatic visceral disease, then chemotherapy  ?  Monitor CA 27.29 level?every month.  ?  If PIK3CA mutation positive,?then fulvestrant plus alpelisib is another category 1 treatment option  Result is pending.  ?  Genetic testing negative for BRCA1/2 mutation. ?Therefore,?not a candidate for treatment with?PARP inhibitors (olaparib, talazoparib).  ?  Continue?indefinite anticoagulation?for pulmonary emboli incidentally?detected?on chest CT dated 06/10/2019.  Reports no bleeding.  ?  Xgeva held from 03/20/2019?for osteonecrosis of the jaw.  Patient follows up with Dr. Jean Claude Ley, oral surgeon.  Apparently, because of her?being on chronic anticoagulation,?oral surgery was avoided. ?She continues to complain of?pain when eating.  ?  Restage with contrast-enhanced CT scans of chest, abdomen, and pelvis, and whole-body nuclear medicine bone scan in 3 months (April 2020).  ?  Patient remains under the care of home hospice?to help her with her daily needs.  ?  Follow-up visit with NP in 1 month, with CBC, CMP, and CA 27.29 level.  ?  Above discussed at length with her. ?All questions answered. ?Went over labs and gave her copies for her record. ?She understands and agrees with this plan.  Physical Exam  Vitals & Measurements  T:?36.9? ?C (Oral)? HR:?88(Peripheral)? RR:?18? BP:?147/83? SpO2:?96%?  HT:?160?cm? WT:?62.6?kg?  BMI:?24.45?   Problem List/Past Medical History  Ongoing  Bone metastases  Metastatic breast cancer  Morbid obesity  Historical  Breast ca  Cancer, metastatic  Hepatic neoplasm secondary  New onset of headaches  Obesity  Terminal Illness  Unsteady gait  Procedure/Surgical History  Drainage of Right Pleural Cavity with Drainage Device, Percutaneous Approach (2017)  Thoracentesis, needle or catheter, aspiration of the pleural space; with imaging guidance (2017)  Mastectomy ()  Appendectomy;  back surgery    Mastectomy   Medications  acetaminophen-codeine #4, Oral, q6hr  amLODIPine 5 mg oral tablet, 5 mg= 1 tab(s), Oral, Daily  Arimidex 1 mg oral tablet, 1 mg= 1 tab(s), Oral, Daily, 3 refills  calcium (as carbonate)-vitamin D 500 mg-400 intl units oral tablet, 1 tab(s), Oral, BID, 11 refills  dexamethasone 1 mg oral tablet, 1 mg= 1 tab(s), Oral, BID  haloperidol 1 mg oral tablet, 1 mg= 1 tab(s), Oral, TID  Ibrance 75 mg oral capsule, 75 mg= 1 cap(s), Oral, Daily,? ?Not Taking per Prescriber  LORazepam 1 mg oral tablet, 1 mg= 1 tab(s), Oral, TID  PANTOPRAZOLE SODIUM 40 MG TBEC, 40 mg= 1 tab(s), Oral, Daily  polyethylene glycol 3350 oral powder for reconstitution  potassium chloride 10 mEq oral TABLET extended release, 10 mEq= 1 tab(s), Oral, Daily  potassium chloride 20 mEq oral TABLET extended release, 20 mEq= 1 tab(s), Oral, BID,? ?Unable to obtain: needs refill  QUEtiapine 25 mg oral tablet, 25 mg= 1 tab(s), Oral, TID  SENNA 8.6 MG TABS, 17.2 mg= 2 tab(s), Oral, Daily  traMADol 50 mg oral tablet, 50 mg= 1 tab(s), Oral, q6hr, PRN  Xarelto 15mg Tablet, 15 mg= 1 tab(s), Oral, qPM  Zofran ODT 8 mg oral tablet, disintegrating, 8 mg= 1 tab(s), Oral, TID, PRN, 1 refills  Allergies  HYDROcodone?(altered mental state)  Social History  Abuse/Neglect  No, 2020  No, 01/10/2020  No, 2020  No, 2020  Alcohol  Past, 2018  Never, 04/15/2017  Employment/School  Retired,  05/03/2018  Exercise  Exercise frequency: Daily. Exercise type: Walking., 10/16/2019  Home/Environment  Lives with Children. Living situation: Home/Independent. Oxygen, Walker/Cane, Wheelchair, TV/Computer concerns: No. Single family house, Risks in environment: Pets/Animal exposure., 10/16/2019  Nutrition/Health  Regular, 05/03/2018  Sexual  Sexual orientation: Straight or heterosexual. Gender Identity Identifies as female., 03/12/2019  Spiritual/Cultural  Voodoo, 10/16/2019  Substance Use  Never, 04/15/2017  Tobacco  5-9 cigarettes (between 1/4 to 1/2 pack)/day in last 30 days, No, 01/30/2020  10 or more cigarettes (1/2 pack or more)/day in last 30 days, No, 01/10/2020  10 or more cigarettes (1/2 pack or more)/day in last 30 days, No, 01/09/2020  10 or more cigarettes (1/2 pack or more)/day in last 30 days, No, 01/03/2020  Family History  CAD (coronary artery disease): Mother.  Diabetes mellitus type 2: Mother.  High blood pressure 09-AUG-2016 15:25:48<$>: Mother.  Mesothelioma: Father.  Primary malignant neoplasm of breast: Other.  Immunizations  Vaccine Date Status   influenza virus vaccine, inactivated 10/09/2019 Given   influenza virus vaccine, inactivated 10/12/2018 Given   influenza virus vaccine, inactivated 10/23/2017 Given       Apparently,?lab does not have enough tissue to run PIK3CA, NTRK gene fusion,?and MSI-H/dMMR?tests.